# Patient Record
Sex: MALE | Race: WHITE | NOT HISPANIC OR LATINO | Employment: OTHER | URBAN - METROPOLITAN AREA
[De-identification: names, ages, dates, MRNs, and addresses within clinical notes are randomized per-mention and may not be internally consistent; named-entity substitution may affect disease eponyms.]

---

## 2018-01-24 ENCOUNTER — OFFICE VISIT (OUTPATIENT)
Dept: FAMILY MEDICINE | Facility: CLINIC | Age: 68
End: 2018-01-24
Payer: COMMERCIAL

## 2018-01-24 VITALS
TEMPERATURE: 98.1 F | BODY MASS INDEX: 25.26 KG/M2 | WEIGHT: 186.5 LBS | DIASTOLIC BLOOD PRESSURE: 69 MMHG | HEIGHT: 72 IN | SYSTOLIC BLOOD PRESSURE: 119 MMHG | HEART RATE: 54 BPM | RESPIRATION RATE: 12 BRPM | OXYGEN SATURATION: 96 %

## 2018-01-24 DIAGNOSIS — M79.89 LEG SWELLING: Primary | ICD-10-CM

## 2018-01-24 DIAGNOSIS — Z12.11 SPECIAL SCREENING FOR MALIGNANT NEOPLASMS, COLON: ICD-10-CM

## 2018-01-24 DIAGNOSIS — L30.9 ECZEMA, UNSPECIFIED TYPE: ICD-10-CM

## 2018-01-24 DIAGNOSIS — M77.12 LATERAL EPICONDYLITIS OF LEFT ELBOW: ICD-10-CM

## 2018-01-24 LAB — NT-PROBNP SERPL-MCNC: 63 PG/ML (ref 0–125)

## 2018-01-24 PROCEDURE — 80053 COMPREHEN METABOLIC PANEL: CPT | Performed by: FAMILY MEDICINE

## 2018-01-24 PROCEDURE — 99203 OFFICE O/P NEW LOW 30 MIN: CPT | Performed by: FAMILY MEDICINE

## 2018-01-24 PROCEDURE — 83880 ASSAY OF NATRIURETIC PEPTIDE: CPT | Performed by: FAMILY MEDICINE

## 2018-01-24 PROCEDURE — 36415 COLL VENOUS BLD VENIPUNCTURE: CPT | Performed by: FAMILY MEDICINE

## 2018-01-24 NOTE — PROGRESS NOTES
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   - no chronic health issue.   Some joint pain.   Right shoulder - rotator cuff tear.  Elbow - arthroscopic surgery.  Both knee arthritis. Arthroscopic surgeries and hyluronic acid injections.      Left elbow - for last 3-4 months. Painful with movement. For 2 weeks better.   No swelling. ROM painful. No previous injury.     Colonoscopy - overdue. Previous was normal. No FH of colon cancer.     History of ankle injury when young. Swells left ankle - for last 3 months getting worse. Some lesions and itchy. Cortisone helps but not curing.      Problem list and histories reviewed & adjusted, as indicated.  Additional history: as documented    Labs reviewed in EPIC    Reviewed and updated as needed this visit by clinical staff  Tobacco  Allergies  Meds  Med Hx  Surg Hx  Fam Hx  Soc Hx      Reviewed and updated as needed this visit by Provider        Social History     Social History     Marital status:      Spouse name: N/A     Number of children: N/A     Years of education: N/A     Social History Main Topics     Smoking status: Former Smoker     Smokeless tobacco: Never Used     Alcohol use Yes      Comment: 2 glasses of wine twice a week      Drug use: No     Sexual  "activity: Yes     Other Topics Concern     Parent/Sibling W/ Cabg, Mi Or Angioplasty Before 65f 55m? No     Social History Narrative     No Known Allergies  Patient Active Problem List   Diagnosis     PAIN JOINT, SHOULDER     Other postprocedural status(V45.89)     PAIN HIP JOINT     Reviewed medications, social history and  past medical and surgical history.    Review of system: for general, respiratory, CVS, GI and psychiatry negative except for noted above.     EXAM:  /69 (Patient Position: Sitting, Cuff Size: Adult Regular)  Pulse 54  Temp 98.1  F (36.7  C) (Oral)  Resp 12  Ht 6' 0.25\" (1.835 m)  Wt 186 lb 8 oz (84.6 kg)  SpO2 96%  BMI 25.12 kg/m2  Constitutional: healthy, alert and no distress   Psychiatric: mentation appears normal and affect normal/bright  Both lower extremity with 1+ pitting edema.  He has a few diffuse erythematous patches with some excoriation marks.  Left elbow-no visible or palpable deformity.  Just below lateral epicondyles tenderness present.  Passive range of motion can reproduce the same tenderness.    ASSESSMENT / PLAN:  (M79.89) Leg swelling  (primary encounter diagnosis)  Comment: He does not have any cardiac, renal or dietary deficiency.  We talked about possible abdomen deficiency or renal or cardiac etiology or other non-concerning etiology.  We talked about trial of compression socks and monitoring versus obtaining basic labs today and rule out above-mentioned serious etiology.  He is willing to consider one-time lab test today.  He understand those are not the most accurate test but without any other risk factor it may be reasonable to just consider post lab test today.  Plan: Comprehensive metabolic panel, BNP-N terminal         pro             (M77.12) Lateral epicondylitis of left elbow  Comment:     Plan: His symptoms are somewhat improved.  We are going to provide him some home exercise as below and if is not improving he can just call us for physical " therapy referral.    Eczema  Comment -using OTC hydrocortisone with good benefit.  I offered him triamcinolone but he would like to hold off on that for now.  We discussed using thick layer of moisturizer.    (Z12.11) Special screening for malignant neoplasms, colon  Comment:    Plan: GASTROENTEROLOGY ADULT REF PROCEDURE ONLY                   Patient Instructions     For colonoscopy - [phone]                 Lateral Epicondylitis (Tennis Elbow)   Rehabilitation Exercises   You may do the stretching exercises right away. You may do the strengthening exercises when stretching is nearly painless.   Stretching exercises     Wrist range of motion: Bend your wrist forward and backward as far as you can. Do 3 sets of 10.     Pronation and supination of the forearm: With your elbow bent 90 , turn your palm upward and hold for 5 seconds. Slowly turn your palm downward and hold for 5 seconds. Make sure you keep your elbow at your side and bent 90  throughout this exercise. Do 3 sets of 10.     Elbow range of motion: Gently bring your palm up toward your shoulder and bend your elbow as far as you can. Then straighten your elbow as far as you can 10 times. Do 3 sets of 10.   Strengthening exercises     Wrist flexion exercise: Hold a can or hammer handle in your hand with your palm facing up. Bend your wrist upward. Slowly lower the weight and return to the starting position. Do 3 sets of 10. Gradually increase the weight of the can or weight you are holding.     Wrist extension exercise: Hold a soup can or hammer handle in your hand with your palm facing down. Slowly bend your wrist upward. Slowly lower the weight down into the starting position. Do 3 sets of 10. Gradually increase the weight of the object you are holding.     Wrist radial deviation strengthening: Put your wrist in the sideways position with your thumb up. Hold a can of soup or a hammer handle and gently bend your wrist up, with the thumb reaching toward  the ceiling. Slowly lower to the starting position. Do not move your forearm throughout this exercise. Do 3 sets of 10.     Forearm pronation and supination strengthening: Hold a soup can or hammer handle in your hand and bend your elbow 90 . Slowly rotate your hand with your palm upward and then palm down. Do 3 sets of 10.     Wrist extension (with broom handle): Stand up and hold a broom handle in both hands. With your arms at shoulder level, elbows straight and palms down, roll the broom handle backward in your hand as if you are reeling something in using a broom handle. Do 3 sets of 10.   Written by Estela Topete MS, PT, for [employer]   Published by [employer]   This content is reviewed periodically and is subject to change as new health information becomes available. The information is intended to inform and educate and is not a replacement for medical evaluation, advice, diagnosis or treatment by a healthcare professional.   Sports Medicine Advisor 2003.1 Index  Sports Medicine Advisor 2003.1 Credits   Copyright   2003 [employer] All rights reserved.

## 2018-01-24 NOTE — LETTER
January 26, 2018      Neptali Cevallosdamian  [address] [facility] [address]        Dear ,    We are writing to inform you of your test results.    It was a pleasure seeing you in the clinic.  BNP to rule out heart failure and CMP checking your kidney function, albumin and liver function tests are completely normal.  So most likely leg swelling may not be suggestive of anything serious.  Please call me back if you have any further questions or concerns.    Resulted Orders   Comprehensive metabolic panel   Result Value Ref Range    Sodium 139 133 - 144 mmol/L    Potassium 4.1 3.4 - 5.3 mmol/L    Chloride 106 94 - 109 mmol/L    Carbon Dioxide 25 20 - 32 mmol/L    Anion Gap 8 3 - 14 mmol/L    Glucose 98 70 - 99 mg/dL      Comment:      Non Fasting    Urea Nitrogen 22 7 - 30 mg/dL    Creatinine 0.95 0.66 - 1.25 mg/dL    GFR Estimate 79 >60 mL/min/1.7m2      Comment:      Non  GFR Calc    GFR Estimate If Black >90 >60 mL/min/1.7m2      Comment:       GFR Calc    Calcium 8.5 8.5 - 10.1 mg/dL    Bilirubin Total 0.5 0.2 - 1.3 mg/dL    Albumin 4.0 3.4 - 5.0 g/dL    Protein Total 6.8 6.8 - 8.8 g/dL    Alkaline Phosphatase 60 40 - 150 U/L    ALT 16 0 - 70 U/L    AST 17 0 - 45 U/L   BNP-N terminal pro   Result Value Ref Range    N-Terminal Pro Bnp 63 0 - 125 pg/mL      Comment:         Reference range shown and results flagged as abnormal are for the outpatient,   non acute settings. Establishing a baseline value for each individual patient   is useful for follow-up.  Suggested inpatient cut points for confirming diagnosis of CHF in an acute   setting are:   >450 pg/mL (age 18 to less than 50)   >900 pg/mL (age 50 to less than 75)   >1800 pg/mL (75 yrs and older)  An inpatient or emergency department NT-proPBNP <300 pg/mL effectively rules   out acute CHF, with 99% negative predictive value.          If you have any questions or concerns, please call the clinic at the number listed  above.       Sincerely,        Mark Jones MD/nr

## 2018-01-24 NOTE — MR AVS SNAPSHOT
After Visit Summary   1/24/2018    Neptali Monae    MRN: [id number]           Patient Information     Date Of Birth          1950        Visit Information        Provider Department      1/24/2018 1:20 PM Mark Jones MD [facility]        Today's Diagnoses     Special screening for malignant neoplasms, colon    -  1    Leg swelling        Lateral epicondylitis of left elbow          Care Instructions      For colonoscopy - [phone]                 Lateral Epicondylitis (Tennis Elbow)   Rehabilitation Exercises   You may do the stretching exercises right away. You may do the strengthening exercises when stretching is nearly painless.   Stretching exercises     Wrist range of motion: Bend your wrist forward and backward as far as you can. Do 3 sets of 10.     Pronation and supination of the forearm: With your elbow bent 90 , turn your palm upward and hold for 5 seconds. Slowly turn your palm downward and hold for 5 seconds. Make sure you keep your elbow at your side and bent 90  throughout this exercise. Do 3 sets of 10.     Elbow range of motion: Gently bring your palm up toward your shoulder and bend your elbow as far as you can. Then straighten your elbow as far as you can 10 times. Do 3 sets of 10.   Strengthening exercises     Wrist flexion exercise: Hold a can or hammer handle in your hand with your palm facing up. Bend your wrist upward. Slowly lower the weight and return to the starting position. Do 3 sets of 10. Gradually increase the weight of the can or weight you are holding.     Wrist extension exercise: Hold a soup can or hammer handle in your hand with your palm facing down. Slowly bend your wrist upward. Slowly lower the weight down into the starting position. Do 3 sets of 10. Gradually increase the weight of the object you are holding.     Wrist radial deviation strengthening: Put your wrist in the sideways position with your thumb up. Hold a can of  soup or a hammer handle and gently bend your wrist up, with the thumb reaching toward the ceiling. Slowly lower to the starting position. Do not move your forearm throughout this exercise. Do 3 sets of 10.     Forearm pronation and supination strengthening: Hold a soup can or hammer handle in your hand and bend your elbow 90 . Slowly rotate your hand with your palm upward and then palm down. Do 3 sets of 10.     Wrist extension (with broom handle): Stand up and hold a broom handle in both hands. With your arms at shoulder level, elbows straight and palms down, roll the broom handle backward in your hand as if you are reeling something in using a broom handle. Do 3 sets of 10.   Written by Estela Topete, MS, PT, for [employer].   Published by [employer].   This content is reviewed periodically and is subject to change as new health information becomes available. The information is intended to inform and educate and is not a replacement for medical evaluation, advice, diagnosis or treatment by a healthcare professional.   Sports Medicine Advisor 2003.1 Index  Sports Medicine Advisor 2003.1 Credits   Copyright   2003 [employer]. All rights reserved.                            Follow-ups after your visit        Additional Services     GASTROENTEROLOGY ADULT REF PROCEDURE ONLY       Last Lab Result: No results found for: CR  Body mass index is 25.12 kg/(m^2).     Needed:  No  Language:  English    Patient will be contacted to schedule procedure.     Please be aware that coverage of these services is subject to the terms and limitations of your health insurance plan.  Call member services at your health plan with any benefit or coverage questions.  Any procedures must be performed at a [facility] facility OR coordinated by your clinic's referral office.    Please bring the following with you to your appointment:    (1) Any X-Rays, CTs or MRIs which have been  "performed.  Contact the facility where they were done to arrange for  prior to your scheduled appointment.    (2) List of current medications   (3) This referral request   (4) Any documents/labs given to you for this referral                  Who to contact     If you have questions or need follow up information about today's clinic visit or your schedule please contact [facility] directly at [phone].  Normal or non-critical lab and imaging results will be communicated to you by [employer], letter or phone within 4 business days after the clinic has received the results. If you do not hear from us within 7 days, please contact the clinic through [employer] or phone. If you have a critical or abnormal lab result, we will notify you by phone as soon as possible.  Submit refill requests through tic or call your pharmacy and they will forward the refill request to us. Please allow 3 business days for your refill to be completed.          Additional Information About Your Visit        [employer] Information     tic lets you send messages to your doctor, view your test results, renew your prescriptions, schedule appointments and more. To sign up, go to [email or web address] . Click on \"Log in\" on the left side of the screen, which will take you to the Welcome page. Then click on \"Sign up Now\" on the right side of the page.     You will be asked to enter the access code listed below, as well as some personal information. Please follow the directions to create your username and password.     Your access code is: [email or web address]  Expires: 2018  2:15 PM     Your access code will  in 90 days. If you need help or a new code, please call your [facility] clinic or [phone].        Care EveryWhere ID     This is your Care EveryWhere ID. This could be used by other organizations to access your [facility] medical records  [id number]        Your Vitals Were     Pulse Temperature Respirations " "Height Pulse Oximetry BMI (Body Mass Index)    54 98.1  F (36.7  C) (Oral) 12 6' 0.25\" (1.835 m) 96% 25.12 kg/m2       Blood Pressure from Last 3 Encounters:   01/24/18 119/69   06/05/13 141/78    Weight from Last 3 Encounters:   01/24/18 186 lb 8 oz (84.6 kg)   06/05/13 188 lb (85.3 kg)              We Performed the Following     BNP-N terminal pro     Comprehensive metabolic panel     GASTROENTEROLOGY ADULT REF PROCEDURE ONLY        Primary Care Provider Office Phone # Fax #    Mark Dexter Jones [phone] [address] [place], [place] [id number]        Equal Access to Services     MARY BEE : Benji Butt, waaxda luqadaha, qaybta kaalmada arun, robi millan . So [facility] [phone].    ATENCIÓN: Si habla español, tiene a lyons disposición servicios gratuitos de asistencia lingüística. [facility] [phone].    We comply with applicable federal civil rights laws and [place] laws. We do not discriminate on the basis of race, color, national origin, age, disability, sex, sexual orientation, or gender identity.            Thank you!     Thank you for choosing [facility]  for your care. Our goal is always to provide you with excellent care. Hearing back from our patients is one way we can continue to improve our services. Please take a few minutes to complete the written survey that you may receive in the mail after your visit with us. Thank you!             Your Updated Medication List - Protect others around you: Learn how to safely use, store and throw away your medicines at www.disposemymeds.org.          This list is accurate as of 1/24/18  2:15 PM.  Always use your most recent med list.                   Brand Name Dispense Instructions for use Diagnosis    NO ACTIVE MEDICATIONS             "

## 2018-01-24 NOTE — NURSING NOTE
"Chief Complaint   Patient presents with     Establish Care       Initial /69 (Patient Position: Sitting, Cuff Size: Adult Regular)  Pulse 54  Temp 98.1  F (36.7  C) (Oral)  Resp 12  Ht 6' 0.25\" (1.835 m)  Wt 186 lb 8 oz (84.6 kg)  SpO2 96%  BMI 25.12 kg/m2 Estimated body mass index is 25.12 kg/(m^2) as calculated from the following:    Height as of this encounter: 6' 0.25\" (1.835 m).    Weight as of this encounter: 186 lb 8 oz (84.6 kg).  Medication Reconciliation: complete     Marielena Alvarez CMA      "

## 2018-01-24 NOTE — PATIENT INSTRUCTIONS
For colonoscopy - [phone]                 Lateral Epicondylitis (Tennis Elbow)   Rehabilitation Exercises   You may do the stretching exercises right away. You may do the strengthening exercises when stretching is nearly painless.   Stretching exercises     Wrist range of motion: Bend your wrist forward and backward as far as you can. Do 3 sets of 10.     Pronation and supination of the forearm: With your elbow bent 90 , turn your palm upward and hold for 5 seconds. Slowly turn your palm downward and hold for 5 seconds. Make sure you keep your elbow at your side and bent 90  throughout this exercise. Do 3 sets of 10.     Elbow range of motion: Gently bring your palm up toward your shoulder and bend your elbow as far as you can. Then straighten your elbow as far as you can 10 times. Do 3 sets of 10.   Strengthening exercises     Wrist flexion exercise: Hold a can or hammer handle in your hand with your palm facing up. Bend your wrist upward. Slowly lower the weight and return to the starting position. Do 3 sets of 10. Gradually increase the weight of the can or weight you are holding.     Wrist extension exercise: Hold a soup can or hammer handle in your hand with your palm facing down. Slowly bend your wrist upward. Slowly lower the weight down into the starting position. Do 3 sets of 10. Gradually increase the weight of the object you are holding.     Wrist radial deviation strengthening: Put your wrist in the sideways position with your thumb up. Hold a can of soup or a hammer handle and gently bend your wrist up, with the thumb reaching toward the ceiling. Slowly lower to the starting position. Do not move your forearm throughout this exercise. Do 3 sets of 10.     Forearm pronation and supination strengthening: Hold a soup can or hammer handle in your hand and bend your elbow 90 . Slowly rotate your hand with your palm upward and then palm down. Do 3 sets of 10.     Wrist extension (with broom handle):  Stand up and hold a broom handle in both hands. With your arms at shoulder level, elbows straight and palms down, roll the broom handle backward in your hand as if you are reeling something in using a broom handle. Do 3 sets of 10.   Written by Estela Topete MS, PT, for [employer].   Published by [employer].   This content is reviewed periodically and is subject to change as new health information becomes available. The information is intended to inform and educate and is not a replacement for medical evaluation, advice, diagnosis or treatment by a healthcare professional.   Sports Medicine Advisor 2003.1 Index  Sports Medicine Advisor 2003.1 Credits   Copyright   2003 [employer]. All rights reserved.

## 2018-01-25 LAB
ALBUMIN SERPL-MCNC: 4 G/DL (ref 3.4–5)
ALP SERPL-CCNC: 60 U/L (ref 40–150)
ALT SERPL W P-5'-P-CCNC: 16 U/L (ref 0–70)
ANION GAP SERPL CALCULATED.3IONS-SCNC: 8 MMOL/L (ref 3–14)
AST SERPL W P-5'-P-CCNC: 17 U/L (ref 0–45)
BILIRUB SERPL-MCNC: 0.5 MG/DL (ref 0.2–1.3)
BUN SERPL-MCNC: 22 MG/DL (ref 7–30)
CALCIUM SERPL-MCNC: 8.5 MG/DL (ref 8.5–10.1)
CHLORIDE SERPL-SCNC: 106 MMOL/L (ref 94–109)
CO2 SERPL-SCNC: 25 MMOL/L (ref 20–32)
CREAT SERPL-MCNC: 0.95 MG/DL (ref 0.66–1.25)
GFR SERPL CREATININE-BSD FRML MDRD: 79 ML/MIN/1.7M2
GLUCOSE SERPL-MCNC: 98 MG/DL (ref 70–99)
POTASSIUM SERPL-SCNC: 4.1 MMOL/L (ref 3.4–5.3)
PROT SERPL-MCNC: 6.8 G/DL (ref 6.8–8.8)
SODIUM SERPL-SCNC: 139 MMOL/L (ref 133–144)

## 2018-02-12 ENCOUNTER — DOCUMENTATION ONLY (OUTPATIENT)
Dept: VASCULAR SURGERY | Facility: CLINIC | Age: 68
End: 2018-02-12

## 2018-02-12 DIAGNOSIS — Z87.891 FORMER TOBACCO USE: Primary | ICD-10-CM

## 2018-02-12 DIAGNOSIS — Z13.6 ENCOUNTER FOR ABDOMINAL AORTIC ANEURYSM (AAA) SCREENING: Primary | ICD-10-CM

## 2018-02-27 ENCOUNTER — HOSPITAL ENCOUNTER (OUTPATIENT)
Facility: AMBULATORY SURGERY CENTER | Age: 68
End: 2018-02-27
Attending: INTERNAL MEDICINE
Payer: COMMERCIAL

## 2018-03-08 ENCOUNTER — TELEPHONE (OUTPATIENT)
Dept: GASTROENTEROLOGY | Facility: CLINIC | Age: 68
End: 2018-03-08

## 2018-06-25 ENCOUNTER — OFFICE VISIT (OUTPATIENT)
Dept: FAMILY MEDICINE | Facility: CLINIC | Age: 68
End: 2018-06-25
Payer: COMMERCIAL

## 2018-06-25 VITALS
OXYGEN SATURATION: 95 % | HEIGHT: 72 IN | BODY MASS INDEX: 25.87 KG/M2 | RESPIRATION RATE: 15 BRPM | WEIGHT: 191 LBS | DIASTOLIC BLOOD PRESSURE: 65 MMHG | HEART RATE: 50 BPM | TEMPERATURE: 98.6 F | SYSTOLIC BLOOD PRESSURE: 128 MMHG

## 2018-06-25 DIAGNOSIS — Z23 NEED FOR PROPHYLACTIC VACCINATION AGAINST STREPTOCOCCUS PNEUMONIAE (PNEUMOCOCCUS): ICD-10-CM

## 2018-06-25 DIAGNOSIS — G56.32 LEFT RADIAL NEURITIS: ICD-10-CM

## 2018-06-25 DIAGNOSIS — M79.671 INTRACTABLE RIGHT HEEL PAIN: Primary | ICD-10-CM

## 2018-06-25 PROCEDURE — 99213 OFFICE O/P EST LOW 20 MIN: CPT | Mod: 25 | Performed by: FAMILY MEDICINE

## 2018-06-25 PROCEDURE — G0009 ADMIN PNEUMOCOCCAL VACCINE: HCPCS | Performed by: FAMILY MEDICINE

## 2018-06-25 PROCEDURE — 90670 PCV13 VACCINE IM: CPT | Performed by: FAMILY MEDICINE

## 2018-06-25 NOTE — PROGRESS NOTES
SUBJECTIVE:   Neptali Monae is a 68 year old male who presents to clinic today for the following health issues:    Musculoskeletal problem/pain      Duration: 1 month ago    Description  Location: right foot pain    Intensity:  moderate    Accompanying signs and symptoms: sharp pain that comes and go    History  Previous similar problem: no   Previous evaluation:  none    Precipitating or alleviating factors:  Trauma or overuse: YES  Aggravating factors include: none    Therapies tried and outcome: ice and Ibuprofen as needed. Pt states stretching helps a little bit    Going to retire soon. OBGYN.    Right heel - painful in the morning. During day time it gets better.   Stretching helps somewhat.   No previous surgery.  Using arch supports with flat feet. No similar issues before.     Right handed. When biking - left hand 4th and 5th finger area numbness and some discomfort. Tingling worst.        Problem list and histories reviewed & adjusted, as indicated.  Additional history: as documented    Labs reviewed in EPIC    Reviewed and updated as needed this visit by clinical staff    Reviewed and updated as needed this visit by Provider      Social History     Social History     Marital status:      Spouse name: N/A     Number of children: N/A     Years of education: N/A     Occupational History     Not on file.     Social History Main Topics     Smoking status: Former Smoker     Smokeless tobacco: Never Used     Alcohol use Yes      Comment: 2 glasses of wine twice a week      Drug use: No     Sexual activity: Yes     Other Topics Concern     Parent/Sibling W/ Cabg, Mi Or Angioplasty Before 65f 55m? No     Social History Narrative     No Known Allergies  Patient Active Problem List   Diagnosis     PAIN JOINT, SHOULDER     Other postprocedural status(V45.89)     PAIN HIP JOINT     Reviewed medications, social history and  past medical and surgical history.    Review of system: for general, respiratory,  CVS, GI and psychiatry negative except for noted above.     EXAM:  /65 (BP Location: Right arm, Patient Position: Chair, Cuff Size: Adult Regular)  Pulse 50  Temp 98.6  F (37  C) (Oral)  Resp 15  Ht 6' (1.829 m)  Wt 191 lb (86.6 kg)  SpO2 95%  BMI 25.9 kg/m2  Constitutional: healthy, alert and no distress   Psychiatric: mentation appears normal and affect normal/bright most likely calcaneal spur versus plantar fasciitis.  JOINT/EXTREMITIES: Left  Wrist no visible deformity.  Left fifth finger-some flexion deformity.   and strength are normal.    Right heel no visible deformity.  Just at the end of plantar fascia area on the medial heel on plantar surface reported tenderness.    ASSESSMENT / PLAN:   (M54.671) Intractable right heel pain  (primary encounter diagnosis)  Comment:   Plan: I provided him some home exercises and if that fails he should see her podiatrist for further evaluation.  Discussed supportive shoes.    (G56.32) Left radial neuritis  Comment: He has some radioulnar neuropathy type of symptoms that is exacerbated by biking.  Currently symptoms are stable.  He is going to try some wrist splint and if it is not improving he is going to see sports medicine for further evaluation.  Plan: SPORTS MEDICINE REFERRAL            (Z23) Need for prophylactic vaccination against Streptococcus pneumoniae (pneumococcus)  Comment:   Plan: PNEUMOCOCCAL CONJ VACCINE 13 VALENT IM                 Patient Instructions                         Rehabilitation Exercises   You may begin exercising the muscles of your foot right away by gently stretching them as follows:     Prone hip extension: Lie on your stomach with your legs straight out behind you. Tighten the buttocks and thigh muscles of your injured leg and lift it off the floor about 8 inches. Keep your knee straight. Hold for 5 seconds. Then lower your leg and relax. Do 3 sets of 10.     Towel stretch: Sit on a hard surface with one leg stretched out  in front of you. Loop a towel around your toes and the ball of your foot and pull the towel toward your body keeping your knee straight. Hold this position for 15 to 30 seconds then relax. Repeat 3 times.   When the towel stretch becomes too easy, you may begin doing the standing calf stretch.     Standing calf stretch: Facing a wall, put your hands against the wall at about eye level. Keep one leg back with the heel on the floor, and the other leg forward. Turn your back foot slightly inward (as if you were pigeon-toed) as you slowly lean into the wall until you feel a stretch in the back of your calf. Hold for 15 to 30 seconds. Repeat 3 times and then switch the position of your legs and repeat the exercise 3 times. Do this exercise several times each day.     Sitting plantar fascia stretch: Sit in a chair and cross one foot over your other knee. Grab the base of your toes and pull them back toward your leg until you feel a comfortable stretch. Hold 15 seconds and repeat 3 times.   When you can stand comfortably on your injured foot, you can begin standing to stretch the bottom of your foot using the plantar fascia stretch.     Achilles stretch: Stand with the ball of one foot on a stair. Reach for the bottom step with your heel until you feel a stretch in the arch of your foot. Hold this position for 15 to 30 seconds and then relax. Repeat 3 times.   After you have stretched the bottom muscles of your foot, you can begin strengthening the top muscles of your foot.     Frozen can roll: Roll your bare injured foot back and forth from your heel to your mid-arch over a frozen juice can. Repeat for 3 to 5 minutes. This exercise is particularly helpful if done first thing in the morning.     Towel pickup: With your heel on the ground,  a towel with your toes. Release. Repeat 10 to 20 times. When this gets easy, add more resistance by placing a book or small weight on the towel.     Balance and reach exercises    Stand upright next to a chair with your injured leg farthest from the chair. This will provide you with support if you need it. Stand just on the foot of your injured leg. Try to raise the arch of this foot while keeping your toes on the floor.   A. Keep your foot in this position and reach forward in front of you with the hand farthest away from the chair, allowing your knee to bend. Repeat this 10 times while maintaining the arch height. This exercise can be made more difficult by reaching farther in front of you. Do 2 sets.   B.  the same position as above. While maintaining your arch height, reach the hand farthest away from the chair across your body toward the chair. The farther you reach, the more challenging the exercise. Do 2 sets of 10.     Heel raise: Balance yourself while standing behind a chair or counter. Using the chair to help you, raise your body up onto your toes and hold for 5 seconds. Then slowly lower yourself down without holding onto the chair. Hold onto the chair or counter if you need to. When this exercise becomes less painful, try lowering on one leg only. Repeat 10 times. Do 3 sets of 10.     Side-lying leg lift: Lying on your uninjured side, tighten the front thigh muscles on your top leg and lift that leg 8 to 10 inches away from the other leg. Keep the leg straight and lower slowly. Do 3 sets of 10.   Written by Estela Topete, MS, PT, and Kalie Alvarez PT, [facility], [facility], for [employer].   Published by [employer].   Last modified: 2009-02-09   Last reviewed: 2008-07-07   This content is reviewed periodically and is subject to change as new health information becomes available. The information is intended to inform and educate and is not a replacement for medical evaluation, advice, diagnosis or treatment by a healthcare professional.   Sports Medicine Advisor 2009.1 Index

## 2018-06-25 NOTE — NURSING NOTE
Screening Questionnaire for Adult Immunization    Are you sick today?   No   Do you have allergies to medications, food, a vaccine component or latex?   No   Have you ever had a serious reaction after receiving a vaccination?   No   Do you have a long-term health problem with heart disease, lung disease, asthma, kidney disease, metabolic disease (e.g. diabetes), anemia, or other blood disorder?   No   Do you have cancer, leukemia, HIV/AIDS, or any other immune system problem?   No   In the past 3 months, have you taken medications that affect  your immune system, such as prednisone, other steroids, or anticancer drugs; drugs for the treatment of rheumatoid arthritis, Crohn s disease, or psoriasis; or have you had radiation treatments?   No   Have you had a seizure, or a brain or other nervous system problem?   No   During the past year, have you received a transfusion of blood or blood     products, or been given immune (gamma) globulin or antiviral drug?   No   For women: Are you pregnant or is there a chance you could become        pregnant during the next month?   No   Have you received any vaccinations in the past 4 weeks?   No     Immunization questionnaire answers were all negative.        Per orders of Dr. BRENNAN, injection of PREVNAR 13 given by Jamaal Moreno. Patient instructed to remain in clinic for 15 minutes afterwards, and to report any adverse reaction to me immediately.     Due to injection administration, patient instructed to remain in clinic for 15 minutes  afterwards, and to report any adverse reaction to me immediately.    Screening performed by Jamaal Moreno on 6/25/2018 at 2:59 PM.    Jamaal Moreno MA on 6/25/2018 at 2:59 PM

## 2018-06-25 NOTE — PATIENT INSTRUCTIONS
Rehabilitation Exercises   You may begin exercising the muscles of your foot right away by gently stretching them as follows:     Prone hip extension: Lie on your stomach with your legs straight out behind you. Tighten the buttocks and thigh muscles of your injured leg and lift it off the floor about 8 inches. Keep your knee straight. Hold for 5 seconds. Then lower your leg and relax. Do 3 sets of 10.     Towel stretch: Sit on a hard surface with one leg stretched out in front of you. Loop a towel around your toes and the ball of your foot and pull the towel toward your body keeping your knee straight. Hold this position for 15 to 30 seconds then relax. Repeat 3 times.   When the towel stretch becomes too easy, you may begin doing the standing calf stretch.     Standing calf stretch: Facing a wall, put your hands against the wall at about eye level. Keep one leg back with the heel on the floor, and the other leg forward. Turn your back foot slightly inward (as if you were pigeon-toed) as you slowly lean into the wall until you feel a stretch in the back of your calf. Hold for 15 to 30 seconds. Repeat 3 times and then switch the position of your legs and repeat the exercise 3 times. Do this exercise several times each day.     Sitting plantar fascia stretch: Sit in a chair and cross one foot over your other knee. Grab the base of your toes and pull them back toward your leg until you feel a comfortable stretch. Hold 15 seconds and repeat 3 times.   When you can stand comfortably on your injured foot, you can begin standing to stretch the bottom of your foot using the plantar fascia stretch.     Achilles stretch: Stand with the ball of one foot on a stair. Reach for the bottom step with your heel until you feel a stretch in the arch of your foot. Hold this position for 15 to 30 seconds and then relax. Repeat 3 times.   After you have stretched the bottom muscles of your foot, you can begin  strengthening the top muscles of your foot.     Frozen can roll: Roll your bare injured foot back and forth from your heel to your mid-arch over a frozen juice can. Repeat for 3 to 5 minutes. This exercise is particularly helpful if done first thing in the morning.     Towel pickup: With your heel on the ground,  a towel with your toes. Release. Repeat 10 to 20 times. When this gets easy, add more resistance by placing a book or small weight on the towel.     Balance and reach exercises   Stand upright next to a chair with your injured leg farthest from the chair. This will provide you with support if you need it. Stand just on the foot of your injured leg. Try to raise the arch of this foot while keeping your toes on the floor.   A. Keep your foot in this position and reach forward in front of you with the hand farthest away from the chair, allowing your knee to bend. Repeat this 10 times while maintaining the arch height. This exercise can be made more difficult by reaching farther in front of you. Do 2 sets.   B.  the same position as above. While maintaining your arch height, reach the hand farthest away from the chair across your body toward the chair. The farther you reach, the more challenging the exercise. Do 2 sets of 10.     Heel raise: Balance yourself while standing behind a chair or counter. Using the chair to help you, raise your body up onto your toes and hold for 5 seconds. Then slowly lower yourself down without holding onto the chair. Hold onto the chair or counter if you need to. When this exercise becomes less painful, try lowering on one leg only. Repeat 10 times. Do 3 sets of 10.     Side-lying leg lift: Lying on your uninjured side, tighten the front thigh muscles on your top leg and lift that leg 8 to 10 inches away from the other leg. Keep the leg straight and lower slowly. Do 3 sets of 10.   Written by Estela Topete MS, PT, and Kalie Alvarez PT, [facility], [facility], for [facility].    Published by [facility].   Last modified: 2009-02-09   Last reviewed: 2008-07-07   This content is reviewed periodically and is subject to change as new health information becomes available. The information is intended to inform and educate and is not a replacement for medical evaluation, advice, diagnosis or treatment by a healthcare professional.   Sports Medicine Advisor 2009.1 Index

## 2018-06-25 NOTE — MR AVS SNAPSHOT
After Visit Summary   6/25/2018    Neptali Monae    MRN: [id number]           Patient Information     Date Of Birth          1950        Visit Information        Provider Department      6/25/2018 2:00 PM Mark Jones MD [facility]        Today's Diagnoses     Intractable right heel pain    -  1    Left radial neuritis        Need for prophylactic vaccination against Streptococcus pneumoniae (pneumococcus)          Care Instructions                          Rehabilitation Exercises   You may begin exercising the muscles of your foot right away by gently stretching them as follows:     Prone hip extension: Lie on your stomach with your legs straight out behind you. Tighten the buttocks and thigh muscles of your injured leg and lift it off the floor about 8 inches. Keep your knee straight. Hold for 5 seconds. Then lower your leg and relax. Do 3 sets of 10.     Towel stretch: Sit on a hard surface with one leg stretched out in front of you. Loop a towel around your toes and the ball of your foot and pull the towel toward your body keeping your knee straight. Hold this position for 15 to 30 seconds then relax. Repeat 3 times.   When the towel stretch becomes too easy, you may begin doing the standing calf stretch.     Standing calf stretch: Facing a wall, put your hands against the wall at about eye level. Keep one leg back with the heel on the floor, and the other leg forward. Turn your back foot slightly inward (as if you were pigeon-toed) as you slowly lean into the wall until you feel a stretch in the back of your calf. Hold for 15 to 30 seconds. Repeat 3 times and then switch the position of your legs and repeat the exercise 3 times. Do this exercise several times each day.     Sitting plantar fascia stretch: Sit in a chair and cross one foot over your other knee. Grab the base of your toes and pull them back toward your leg until you feel a comfortable stretch. Hold  15 seconds and repeat 3 times.   When you can stand comfortably on your injured foot, you can begin standing to stretch the bottom of your foot using the plantar fascia stretch.     Achilles stretch: Stand with the ball of one foot on a stair. Reach for the bottom step with your heel until you feel a stretch in the arch of your foot. Hold this position for 15 to 30 seconds and then relax. Repeat 3 times.   After you have stretched the bottom muscles of your foot, you can begin strengthening the top muscles of your foot.     Frozen can roll: Roll your bare injured foot back and forth from your heel to your mid-arch over a frozen juice can. Repeat for 3 to 5 minutes. This exercise is particularly helpful if done first thing in the morning.     Towel pickup: With your heel on the ground,  a towel with your toes. Release. Repeat 10 to 20 times. When this gets easy, add more resistance by placing a book or small weight on the towel.     Balance and reach exercises   Stand upright next to a chair with your injured leg farthest from the chair. This will provide you with support if you need it. Stand just on the foot of your injured leg. Try to raise the arch of this foot while keeping your toes on the floor.   A. Keep your foot in this position and reach forward in front of you with the hand farthest away from the chair, allowing your knee to bend. Repeat this 10 times while maintaining the arch height. This exercise can be made more difficult by reaching farther in front of you. Do 2 sets.   B.  the same position as above. While maintaining your arch height, reach the hand farthest away from the chair across your body toward the chair. The farther you reach, the more challenging the exercise. Do 2 sets of 10.     Heel raise: Balance yourself while standing behind a chair or counter. Using the chair to help you, raise your body up onto your toes and hold for 5 seconds. Then slowly lower yourself down without  holding onto the chair. Hold onto the chair or counter if you need to. When this exercise becomes less painful, try lowering on one leg only. Repeat 10 times. Do 3 sets of 10.     Side-lying leg lift: Lying on your uninjured side, tighten the front thigh muscles on your top leg and lift that leg 8 to 10 inches away from the other leg. Keep the leg straight and lower slowly. Do 3 sets of 10.   Written by Estela Topete, MS, PT, and aKlie Alvarez PT, [facility], [facility], for [employer].   Published by [employer].   Last modified: 2009-02-09   Last reviewed: 2008-07-07   This content is reviewed periodically and is subject to change as new health information becomes available. The information is intended to inform and educate and is not a replacement for medical evaluation, advice, diagnosis or treatment by a healthcare professional.   Sports Medicine Advisor 2009.1 Index                        Follow-ups after your visit        Additional Services     SPORTS MEDICINE REFERRAL       Your provider has referred you to:  [facility]: Sports Medicine Clinic [facility] [phone]   [email or web address]    Please be aware that coverage of these services is subject to the terms and limitations of your health insurance plan.  Call member services at your health plan with any benefit or coverage questions.      Please bring the following to your appointment:    >>   Any x-rays, CTs or MRIs which have been performed.  Contact the facility where they were done to arrange for  prior to your scheduled appointment.    >>   List of current medications   >>   This referral request   >>   Any documents/labs given to you for this referral                  Who to contact     If you have questions or need follow up information about today's clinic visit or your schedule please contact [facility] directly at [phone].  Normal or non-critical lab and imaging results will be communicated  to you by MyChart, letter or phone within 4 business days after the clinic has received the results. If you do not hear from us within 7 days, please contact the clinic through MyChart or phone. If you have a critical or abnormal lab result, we will notify you by phone as soon as possible.  Submit refill requests through [employer] or call your pharmacy and they will forward the refill request to us. Please allow 3 business days for your refill to be completed.          Additional Information About Your Visit        Care EveryWhere ID     This is your Care EveryWhere ID. This could be used by other organizations to access your [place] medical records  [id number]        Your Vitals Were     Pulse Temperature Respirations Height Pulse Oximetry BMI (Body Mass Index)    50 98.6  F (37  C) (Oral) 15 6' (1.829 m) 95% 25.9 kg/m2       Blood Pressure from Last 3 Encounters:   06/25/18 128/65   01/24/18 119/69   06/05/13 141/78    Weight from Last 3 Encounters:   06/25/18 191 lb (86.6 kg)   01/24/18 186 lb 8 oz (84.6 kg)   06/05/13 188 lb (85.3 kg)              We Performed the Following     PNEUMOCOCCAL CONJ VACCINE 13 VALENT IM     SPORTS MEDICINE REFERRAL        Primary Care Provider Office Phone # Fax #    Mark Dexter Jones [phone] [address] [place] [id number]        Equal Access to Services     [facility]: Hadii hilton Butt, waaxda luzackary, qaybta kaalmarobi keen . So [facility] [phone].    ATENCIÓN: Si habla español, tiene a lyons disposición servicios gratuitos de asistencia lingüística. Tiago al [phone].    We comply with applicable federal civil rights laws and [place] laws. We do not discriminate on the basis of race, color, national origin, age, disability, sex, sexual orientation, or gender identity.            Thank you!     Thank you for choosing [facility]  for your care. Our goal is  always to provide you with excellent care. Hearing back from our patients is one way we can continue to improve our services. Please take a few minutes to complete the written survey that you may receive in the mail after your visit with us. Thank you!             Your Updated Medication List - Protect others around you: Learn how to safely use, store and throw away your medicines at www.disposemymeds.org.          This list is accurate as of 6/25/18  2:43 PM.  Always use your most recent med list.                   Brand Name Dispense Instructions for use Diagnosis    NO ACTIVE MEDICATIONS

## 2018-09-05 ENCOUNTER — TELEPHONE (OUTPATIENT)
Dept: FAMILY MEDICINE | Facility: CLINIC | Age: 68
End: 2018-09-05

## 2018-09-05 DIAGNOSIS — R09.82 POST-NASAL DRIP: ICD-10-CM

## 2018-09-05 DIAGNOSIS — K58.9 SYMPTOMS CONSISTENT WITH IRRITABLE BOWEL SYNDROME: Primary | ICD-10-CM

## 2018-09-05 NOTE — TELEPHONE ENCOUNTER
"Dr. Jones-Please review and advise if you would recommend patient see you in office visit for ENT symptoms vs following up directly with ENT.    Writer called patient who stated:  1. Symptoms for one month:   A. Mucous drainage down back of throat   B. Will cough/hack when this happens   C. Nasal passages clear and no sneezing   D. Concerned there is a \"foreign body back there\"   E. Took an anti-histamine once and symptoms cleared but returned about 1 day later  2. Denied:   A. Seasonal allergies   B. Recent sinus congestion   C. Blood present in mucous  3. Has not used neti pot/nasal rinse  4. Would like ENT referral  5. Attempted to schedule colonoscopy twice   A. Would like to meet with GI provider performing procedure to discuss IBS symptoms prior to colonoscopy   B. Not formally diagnosed with IBS but having IBS symptoms for years, ongoing      Writer explained to patient some GI providers do not have clinic hours and only perform procedures, but referral can likely be ordered and patient can call different GI clinics to ask if provider he would see in office visit would also be provider to perform procedure.    Patient verbalized understanding and in agreement with plan.    Referrals pended.    Thank you!  NANY CaalN, RN          "

## 2018-09-05 NOTE — TELEPHONE ENCOUNTER
Writer called patient and reviewed message per below from Dr. Jones, along with referral information.    Patient verbalized understanding and in agreement with plan.    Writer offered to mail referrals to patient, but patient declined.    NANY CaalN, RN

## 2018-09-05 NOTE — TELEPHONE ENCOUNTER
Seeing ENT and GI seems appropriate. Signed referral.   OK to try OTC antihistamins like zyrtec or allegra as needed if it is helping. They are safe.

## 2018-09-14 ENCOUNTER — HOSPITAL ENCOUNTER (OUTPATIENT)
Facility: AMBULATORY SURGERY CENTER | Age: 68
End: 2018-09-14
Attending: INTERNAL MEDICINE
Payer: COMMERCIAL

## 2018-09-19 ENCOUNTER — TRANSFERRED RECORDS (OUTPATIENT)
Dept: HEALTH INFORMATION MANAGEMENT | Facility: CLINIC | Age: 68
End: 2018-09-19

## 2018-10-18 ENCOUNTER — TRANSFERRED RECORDS (OUTPATIENT)
Dept: HEALTH INFORMATION MANAGEMENT | Facility: CLINIC | Age: 68
End: 2018-10-18

## 2018-11-08 ENCOUNTER — TRANSFERRED RECORDS (OUTPATIENT)
Dept: HEALTH INFORMATION MANAGEMENT | Facility: CLINIC | Age: 68
End: 2018-11-08

## 2018-11-16 ENCOUNTER — TELEPHONE (OUTPATIENT)
Dept: FAMILY MEDICINE | Facility: CLINIC | Age: 68
End: 2018-11-16

## 2018-11-16 DIAGNOSIS — M25.561 PAIN IN BOTH KNEES, UNSPECIFIED CHRONICITY: Primary | ICD-10-CM

## 2018-11-16 DIAGNOSIS — M25.562 PAIN IN BOTH KNEES, UNSPECIFIED CHRONICITY: Primary | ICD-10-CM

## 2018-11-16 NOTE — TELEPHONE ENCOUNTER
Phone call from patient     1. Recent colonoscopy - had biopsy taken, wondering who he gets the results from? Advised that we do not yet have these results and should f/u with GI to get results.     2. Bilateral knee discomfort right more than left   History of meniscus removal in both knees   Has had injections in knees which helped for a while   Recently over the past 1 has been having more pain in knees   No redness/warmth in kneeds   Denies crunching/clicking noises with movement   No recent injury     Patient wonders if he should come in to see pcp or go to ortho?     Patient has seen [facility] in the past and would prefer to go back to them     Advised patient will route to Dr. Jones and return call with his recommendations -   ortho order pended and likely will recommend ortho     Ursula Carranza Registered Nurse   [facility] and [facility]

## 2018-11-16 NOTE — TELEPHONE ENCOUNTER
Patient informed - number given to schedule     Ursula Carranza Registered Nurse   [facility] and [facility] [[email or web address]]

## 2019-01-02 ASSESSMENT — ACTIVITIES OF DAILY LIVING (ADL): CURRENT_FUNCTION: NO ASSISTANCE NEEDED

## 2019-01-02 ASSESSMENT — ENCOUNTER SYMPTOMS
MYALGIAS: 0
SHORTNESS OF BREATH: 0
CONSTIPATION: 0
CHILLS: 0
ABDOMINAL PAIN: 0
HEMATOCHEZIA: 0
WEAKNESS: 0
FREQUENCY: 0
SORE THROAT: 0
NERVOUS/ANXIOUS: 1
PALPITATIONS: 0
DIARRHEA: 0
PARESTHESIAS: 1
JOINT SWELLING: 0
EYE PAIN: 0
ARTHRALGIAS: 1
HEARTBURN: 0
FEVER: 0
DIZZINESS: 0
NAUSEA: 0
HEMATURIA: 0
HEADACHES: 0
DYSURIA: 0
COUGH: 1

## 2019-01-04 ENCOUNTER — OFFICE VISIT (OUTPATIENT)
Dept: FAMILY MEDICINE | Facility: CLINIC | Age: 69
End: 2019-01-04
Payer: COMMERCIAL

## 2019-01-04 VITALS
RESPIRATION RATE: 15 BRPM | BODY MASS INDEX: 25.09 KG/M2 | DIASTOLIC BLOOD PRESSURE: 61 MMHG | HEART RATE: 50 BPM | HEIGHT: 72 IN | SYSTOLIC BLOOD PRESSURE: 105 MMHG | TEMPERATURE: 98.2 F | WEIGHT: 185.25 LBS | OXYGEN SATURATION: 95 %

## 2019-01-04 DIAGNOSIS — Z11.59 NEED FOR HEPATITIS C SCREENING TEST: ICD-10-CM

## 2019-01-04 DIAGNOSIS — R05.9 COUGH: ICD-10-CM

## 2019-01-04 DIAGNOSIS — Z12.5 SCREENING PSA (PROSTATE SPECIFIC ANTIGEN): ICD-10-CM

## 2019-01-04 DIAGNOSIS — Z00.00 MEDICARE ANNUAL WELLNESS VISIT, INITIAL: Primary | ICD-10-CM

## 2019-01-04 DIAGNOSIS — Z23 NEED FOR PROPHYLACTIC VACCINATION AND INOCULATION AGAINST INFLUENZA: ICD-10-CM

## 2019-01-04 DIAGNOSIS — Z13.6 SCREENING FOR CARDIOVASCULAR CONDITION: ICD-10-CM

## 2019-01-04 PROCEDURE — 36415 COLL VENOUS BLD VENIPUNCTURE: CPT | Performed by: FAMILY MEDICINE

## 2019-01-04 PROCEDURE — 80061 LIPID PANEL: CPT | Performed by: FAMILY MEDICINE

## 2019-01-04 PROCEDURE — 86803 HEPATITIS C AB TEST: CPT | Performed by: FAMILY MEDICINE

## 2019-01-04 PROCEDURE — G0008 ADMIN INFLUENZA VIRUS VAC: HCPCS | Performed by: FAMILY MEDICINE

## 2019-01-04 PROCEDURE — 90662 IIV NO PRSV INCREASED AG IM: CPT | Performed by: FAMILY MEDICINE

## 2019-01-04 PROCEDURE — G0438 PPPS, INITIAL VISIT: HCPCS | Performed by: FAMILY MEDICINE

## 2019-01-04 PROCEDURE — G0103 PSA SCREENING: HCPCS | Performed by: FAMILY MEDICINE

## 2019-01-04 PROCEDURE — 99213 OFFICE O/P EST LOW 20 MIN: CPT | Mod: 25 | Performed by: FAMILY MEDICINE

## 2019-01-04 RX ORDER — CODEINE PHOSPHATE AND GUAIFENESIN 10; 100 MG/5ML; MG/5ML
1 SOLUTION ORAL EVERY 6 HOURS PRN
Qty: 120 ML | Refills: 0 | Status: SHIPPED | OUTPATIENT
Start: 2019-01-04 | End: 2019-03-01

## 2019-01-04 RX ORDER — AZITHROMYCIN 250 MG/1
TABLET, FILM COATED ORAL
Qty: 6 TABLET | Refills: 0 | Status: SHIPPED | OUTPATIENT
Start: 2019-01-04 | End: 2019-03-01

## 2019-01-04 ASSESSMENT — ENCOUNTER SYMPTOMS
FREQUENCY: 0
ARTHRALGIAS: 1
JOINT SWELLING: 0
CHILLS: 0
COUGH: 1
PARESTHESIAS: 1
WEAKNESS: 0
FEVER: 0
DIZZINESS: 0
DIARRHEA: 0
SORE THROAT: 0
HEADACHES: 0
CONSTIPATION: 0
NAUSEA: 0
MYALGIAS: 0
HEMATURIA: 0
DYSURIA: 0
HEARTBURN: 0
ABDOMINAL PAIN: 0
NERVOUS/ANXIOUS: 1
SHORTNESS OF BREATH: 0
EYE PAIN: 0
HEMATOCHEZIA: 0
PALPITATIONS: 0

## 2019-01-04 ASSESSMENT — ACTIVITIES OF DAILY LIVING (ADL): CURRENT_FUNCTION: NO ASSISTANCE NEEDED

## 2019-01-04 ASSESSMENT — MIFFLIN-ST. JEOR: SCORE: 1646.7

## 2019-01-04 NOTE — PATIENT INSTRUCTIONS
Preventive Health Recommendations:     See your health care provider every year to    Review health changes.     Discuss preventive care.      Review your medicines if your doctor has prescribed any.    Talk with your health care provider about whether you should have a test to screen for prostate cancer (PSA).    Every 3 years, have a diabetes test (fasting glucose). If you are at risk for diabetes, you should have this test more often.    Every 5 years, have a cholesterol test. Have this test more often if you are at risk for high cholesterol or heart disease.     Every 10 years, have a colonoscopy. Or, have a yearly FIT test (stool test). These exams will check for colon cancer.    Talk to with your health care provider about screening for Abdominal Aortic Aneurysm if you have a family history of AAA or have a history of smoking.  Shots:     Get a flu shot each year.     Get a tetanus shot every 10 years.     Talk to your doctor about your pneumonia vaccines. There are now two you should receive - Pneumovax (PPSV 23) and Prevnar (PCV 13).    Talk to your pharmacist about a shingles vaccine.     Talk to your doctor about the hepatitis B vaccine.  Nutrition:     Eat at least 5 servings of fruits and vegetables each day.     Eat whole-grain bread, whole-wheat pasta and brown rice instead of white grains and rice.     Get adequate Calcium and Vitamin D.   Lifestyle    Exercise for at least 150 minutes a week (30 minutes a day, 5 days a week). This will help you control your weight and prevent disease.     Limit alcohol to one drink per day.     No smoking.     Wear sunscreen to prevent skin cancer.     See your dentist every six months for an exam and cleaning.     See your eye doctor every 1 to 2 years to screen for conditions such as glaucoma, macular degeneration and cataracts.    Personalized Prevention Plan  You are due for the preventive services outlined below.  Your care team is available to assist you in  scheduling these services.  If you have already completed any of these items, please share that information with your care team to update in your medical record.    Health Maintenance Due   Topic Date Due     Hepatitis C Screening  02/20/1968     Cholesterol Lab - every 5 years  02/20/1985     Discuss Advance Directive Planning  02/20/2005     Zoster (Chicken Pox) Vaccine (2 of 3) 02/02/2015     Flu Vaccine (1) 09/01/2018

## 2019-01-04 NOTE — PROGRESS NOTES
"SUBJECTIVE:   Neptali Monae is a 68 year old male who presents for Preventive Visit.  Are you in the first 12 months of your Medicare coverage?  No    Annual Wellness Visit     In general, how would you rate your overall health?  Good    Frequency of exercise:  2-3 days/week    Duration of exercise:  30-45 minutes    Do you usually eat at least 4 servings of fruit and vegetables a day, include whole grains    & fiber and avoid regularly eating high fat or \"junk\" foods?  Yes    Taking medications regularly:  Not Applicable    Medication side effects:  Not applicable    Ability to successfully perform activities of daily living:  No assistance needed    Home Safety:  Throw rugs in the hallway    Hearing Impairment:  Difficulty following a conversation in a noisy restaurant or crowded room    In the past 6 months, have you been bothered by leaking of urine?  No    In general, how would you rate your overall mental or emotional health?  Fair    PHQ-2 Total Score: 1    Additional concerns today:  No      Medical assistant advised patient about addressing additional health concerns that could be billed, as it is not considered a part of a preventive physical. Patient verbalized understanding.    Mark Jones MD, MD on 1/4/2019 at 10:35 AM  - go over medications    Do you feel safe in your environment? Yes    Do you have a Health Care Directive? Yes: Patient states has Advance Directive and will bring in a copy to clinic.       Fall risk  Fallen 2 or more times in the past year?: No  Any fall with injury in the past year?: No    Cognitive Screening   1) Repeat 3 items (Leader, Season, Table)      2) Clock draw: NORMAL  3) 3 item recall: Recalls 3 objects  Results: NORMAL clock, 1-2 items recalled: COGNITIVE IMPAIRMENT LESS LIKELY    Mini-CogTM Copyright ETHAN Piña. Licensed by the author for use in [facility]; reprinted with permission (sameer@.[facility]). All rights reserved.      Do you have sleep " apnea, excessive snoring or daytime drowsiness?: no    Reviewed and updated as needed this visit by clinical staff  Tobacco  Allergies  Meds  Med Hx  Surg Hx  Fam Hx  Soc Hx        Reviewed and updated as needed this visit by Provider        Social History     Tobacco Use     Smoking status: Former Smoker     Smokeless tobacco: Never Used   Substance Use Topics     Alcohol use: Yes     Comment: 2 glasses of wine twice a week      Alcohol Use 1/2/2019   If you drink alcohol do you typically have greater than 3 drinks per day OR greater than 7 drinks per week? No     Current providers sharing in care for this patient include:   Patient Care Team:  Mark Jones MD as PCP - General (Family Practice)  Mark Jones MD as PCP - Assigned PCP    The following health maintenance items are reviewed in Epic and correct as of today:  Health Maintenance   Topic Date Due     HEPATITIS C SCREENING  02/20/1968     LIPID SCREEN Q5 YR MALE (SYSTEM ASSIGNED)  02/20/1985     ADVANCE DIRECTIVE PLANNING Q5 YRS  02/20/2005     ZOSTER IMMUNIZATION (2 of 3) 02/02/2015     INFLUENZA VACCINE (1) 09/01/2018     FALL RISK ASSESSMENT  06/25/2019     PNEUMOVAX IMMUNIZATION 65+ LOW/MEDIUM RISK (2 of 2 - PPSV23) 10/24/2019     PHQ-2 Q1 YR  01/04/2020     DTAP/TDAP/TD IMMUNIZATION (2 - Td) 06/05/2023     COLON CANCER SCREEN (SYSTEM ASSIGNED)  11/08/2028     AORTIC ANEURYSM SCREENING (SYSTEM ASSIGNED)  Completed     IPV IMMUNIZATION  Aged Out     MENINGITIS IMMUNIZATION  Aged Out     Coughing fits for last 2 weeks, getting worse last 2 days. Hard to sleep. No sick contact. No travel.   Post nasal drip only. No nasal drainage.   No history of asthma. Walking is fine.     Seeing ENT - no improvement in nasal drainage. Did CT scan. Did various other meds - no changes. Was last prescribed ipratropium bromide.   No gerd history. Was advised to do allergy testing if this fails.     Colonoscopy - did well but did not get  "conscious sedation despite request.     Review of Systems   Constitutional: Negative for chills and fever.   HENT: Positive for congestion. Negative for ear pain, hearing loss and sore throat.    Eyes: Negative for pain and visual disturbance.   Respiratory: Positive for cough. Negative for shortness of breath.    Cardiovascular: Positive for chest pain and peripheral edema. Negative for palpitations.   Gastrointestinal: Negative for abdominal pain, constipation, diarrhea, heartburn, hematochezia and nausea.   Genitourinary: Negative for discharge, dysuria, frequency, genital sores, hematuria, impotence and urgency.   Musculoskeletal: Positive for arthralgias. Negative for joint swelling and myalgias.   Skin: Positive for rash.   Neurological: Positive for paresthesias. Negative for dizziness, weakness and headaches.   Psychiatric/Behavioral: Positive for mood changes. The patient is nervous/anxious.      OBJECTIVE:   /61 (BP Location: Left arm, Patient Position: Chair, Cuff Size: Adult Regular)   Pulse 50   Temp 98.2  F (36.8  C) (Oral)   Resp 15   Ht 1.826 m (5' 11.9\")   Wt 84 kg (185 lb 4 oz)   SpO2 95%   BMI 25.19 kg/m   Estimated body mass index is 25.19 kg/m  as calculated from the following:    Height as of this encounter: 1.826 m (5' 11.9\").    Weight as of this encounter: 84 kg (185 lb 4 oz).  Physical Exam  GENERAL: healthy, alert and no distress  EYES: Eyes grossly normal to inspection, PERRL and conjunctivae and sclerae normal  HENT: ear canals and TM's normal, nose and mouth without ulcers or lesions  NECK: no adenopathy, no asymmetry, masses, or scars and thyroid normal to palpation  RESP: lungs clear to auscultation - no rales, rhonchi or wheezes  CV: regular rate and rhythm, normal S1 S2, no S3 or S4, no murmur, click or rub, no peripheral edema and peripheral pulses strong  ABDOMEN: soft, nontender, no hepatosplenomegaly, no masses and bowel sounds normal  MS: no gross musculoskeletal " "defects noted, no edema  SKIN: no suspicious lesions or rashes  NEURO: Normal strength and tone, mentation intact and speech normal  PSYCH: mentation appears normal, affect normal/bright         ASSESSMENT / PLAN:   1. Medicare annual wellness visit, initial       2. Need for prophylactic vaccination and inoculation against influenza     - FLU VACCINE, INCREASED ANTIGEN, PRESV FREE, AGE 65+ [26827]  - Vaccine Administration, Initial [76501]    3. Cough  Been to ENT. Was advised to see allergy if not improving. Referral signed. Trial of zpack if not improving but does not look like bacterial infection right now.    - ALLERGY/ASTHMA ADULT REFERRAL  - guaiFENesin-codeine (ROBITUSSIN AC) 100-10 MG/5ML solution; Take 5 mLs by mouth every 6 hours as needed  Dispense: 120 mL; Refill: 0  - azithromycin (ZITHROMAX) 250 MG tablet; Two tablets first day, then one tablet daily for four days.  Dispense: 6 tablet; Refill: 0    4. Screening PSA (prostate specific antigen)     - PSA, screen    5. Screening for cardiovascular condition     - Lipid panel reflex to direct LDL Fasting    6. Need for hepatitis C screening test     - Hepatitis C Screen Reflex to HCV RNA Quant and Genotype    End of Life Planning:  Patient currently has an advanced directive: No.  I have verified the patient's ablity to prepare an advanced directive/make health care decisions.  Literature was provided to assist patient in preparing an advanced directive.    COUNSELING:  Reviewed preventive health counseling, as reflected in patient instructions  Special attention given to:       Regular exercise       Healthy diet/nutrition       Vision screening       Hearing screening       Dental care       Colon cancer screening       Prostate cancer screening    BP Readings from Last 1 Encounters:   01/04/19 105/61     Estimated body mass index is 25.19 kg/m  as calculated from the following:    Height as of this encounter: 1.826 m (5' 11.9\").    Weight as of this " encounter: 84 kg (185 lb 4 oz).           reports that he has quit smoking. he has never used smokeless tobacco.      Appropriate preventive services were discussed with this patient, including applicable screening as appropriate for cardiovascular disease, diabetes, osteopenia/osteoporosis, and glaucoma.  As appropriate for age/gender, discussed screening for colorectal cancer, prostate cancer, breast cancer, and cervical cancer. Checklist reviewing preventive services available has been given to the patient.    Reviewed patients plan of care and provided an AVS. The Basic Care Plan (routine screening as documented in Health Maintenance) for Neptali meets the Care Plan requirement. This Care Plan has been established and reviewed with the Patient.    Counseling Resources:  ATP IV Guidelines  Pooled Cohorts Equation Calculator  Breast Cancer Risk Calculator  FRAX Risk Assessment  ICSI Preventive Guidelines  Dietary Guidelines for Americans, 2010  [employer]'s MyPlate  ASA Prophylaxis  Lung CA Screening    Mark Jones MD, MD  [facility]  Injectable Influenza Immunization Documentation    1.  Is the person to be vaccinated sick today?  Yes- coughing     2. Does the person to be vaccinated have an allergy to a component   of the vaccine?   No  Egg Allergy Algorithm Link    3. Has the person to be vaccinated ever had a serious reaction   to influenza vaccine in the past?   No    4. Has the person to be vaccinated ever had Guillain-Barré syndrome?   No    Form completed by Emely Garrison MA

## 2019-01-04 NOTE — PROGRESS NOTES
"  SUBJECTIVE:   Neptali Monae is a 68 year old male who presents for Preventive Visit.  {PVP to remind patient that this is not necessarily a physical exam; physical exam may or may not be done:067938::\"click delete button to remove this line now\"}  {PVP to inform patient that additional E&M charge may apply, if additional problems addressed:602321::\"click delete button to remove this line now\"}  Are you in the first 12 months of your Medicare Part B coverage?  { :674555::\"No\"}    Physical Health:    In general, how would you rate your overall physical health? { :177041}    Outside of work, how many days during the week do you exercise? { :164819}    Outside of work, approximately how many minutes a day do you exercise?{ :786256}    If you drink alcohol do you typically have >3 drinks per day or >7 drinks per week? { :987084}    Do you usually eat at least 4 servings of fruit and vegetables a day, include whole grains & fiber and avoid regularly eating high fat or \"junk\" foods? { :217650::\"Yes\"}    Do you have any problems taking medications regularly?  { :578613::\"No\"}    Do you have any side effects from medications? { :113124}    Needs assistance for the following daily activities: { :336180}    Which of the following safety concerns are present in your home?  { :234522::\"none identified\"}     Hearing impairment: { :172289}    In the past 6 months, have you been bothered by leaking of urine? { :195266}    Mental Health:    In general, how would you rate your overall mental or emotional health? { :340622}  PHQ-2 Score: (P) 1    Do you feel safe in your environment? { :553281}    Do you have a Health Care Directive? { :198289}    Additional concerns to address?  {If YES, notify patient they may be responsible for a copay, coinsurance or deductible if the visit today includes services such as checking on a sore throat, having an x-ray or lab test, or treating and evaluating a new or existing condition " ":619775::\"No\"}    Fall risk:  { :274791}  {If any of the above assessments are answered yes, consider ordering appropriate referrals (Optional):268452::\"click delete button to remove this line now\"}  Cognitive Screening: { :071912}    {Do you have sleep apnea, excessive snoring or daytime drowsiness? (Optional):663489}    {Outside tests to abstract? :400465}    {additional problems to add (Optional):844026}    Reviewed and updated as needed this visit by clinical staff  Tobacco  Allergies  Meds  Med Hx  Surg Hx  Fam Hx  Soc Hx        Reviewed and updated as needed this visit by Provider        Social History     Tobacco Use     Smoking status: Former Smoker     Smokeless tobacco: Never Used   Substance Use Topics     Alcohol use: Yes     Comment: 2 glasses of wine twice a week                            Current providers sharing in care for this patient include:   Patient Care Team:  Mark Jones MD as PCP - General (Family Practice)  Mark Jones MD as PCP - Assigned PCP    The following health maintenance items are reviewed in Epic and correct as of today:  Health Maintenance   Topic Date Due     HEPATITIS C SCREENING  02/20/1968     LIPID SCREEN Q5 YR MALE (SYSTEM ASSIGNED)  02/20/1985     ADVANCE DIRECTIVE PLANNING Q5 YRS  02/20/2005     ZOSTER IMMUNIZATION (2 of 3) 02/02/2015     INFLUENZA VACCINE (1) 09/01/2018     FALL RISK ASSESSMENT  06/25/2019     PNEUMOVAX IMMUNIZATION 65+ LOW/MEDIUM RISK (2 of 2 - PPSV23) 10/24/2019     PHQ-2 Q1 YR  01/04/2020     DTAP/TDAP/TD IMMUNIZATION (2 - Td) 06/05/2023     COLON CANCER SCREEN (SYSTEM ASSIGNED)  11/08/2028     AORTIC ANEURYSM SCREENING (SYSTEM ASSIGNED)  Completed     IPV IMMUNIZATION  Aged Out     MENINGITIS IMMUNIZATION  Aged Out     {Chronicprobdata (Optional):963127}  {Decision Support (Optional):442564}    ROS:  {ROS COMP:788185}    OBJECTIVE:   There were no vitals taken for this visit. Estimated body mass index is 25.9 kg/m  " "as calculated from the following:    Height as of 18: 1.829 m (6').    Weight as of 18: 86.6 kg (191 lb).  EXAM:   {Exam :358058}    {Diagnostic Test Results (Optional):761776::\"Diagnostic Test Results:\",\"none \"}    ASSESSMENT / PLAN:   {Diag Picklist:008359}    End of Life Planning:  Patient currently has an advanced directive: { :635139}    COUNSELING:  {Medicare Counselin}    BP Readings from Last 1 Encounters:   18 128/65     Estimated body mass index is 25.9 kg/m  as calculated from the following:    Height as of 18: 1.829 m (6').    Weight as of 18: 86.6 kg (191 lb).    {BP Counseling- Complete if BP >= 120/80  (Optional):214717}  {Weight Management Plan (ACO) Complete if BMI is abnormal-  Ages 18-64  BMI >24.9.  Age 65+ with BMI <23 or >30 (Optional):508010}     reports that he has quit smoking. he has never used smokeless tobacco.  {Tobacco Cessation -- Complete if patient is a smoker (Optional):509879}    Appropriate preventive services were discussed with this patient, including applicable screening as appropriate for cardiovascular disease, diabetes, osteopenia/osteoporosis, and glaucoma.  As appropriate for age/gender, discussed screening for colorectal cancer, prostate cancer, breast cancer, and cervical cancer. Checklist reviewing preventive services available has been given to the patient.    Reviewed patients plan of care and provided an AVS. The {CarePlan:912739} for Neptali meets the Care Plan requirement. This Care Plan has been established and reviewed with the {PATIENT, FAMILY MEMBER, CAREGIVER:305066}.    Counseling Resources:  ATP IV Guidelines  Pooled Cohorts Equation Calculator  Breast Cancer Risk Calculator  FRAX Risk Assessment  ICSI Preventive Guidelines  Dietary Guidelines for Americans, 2010  USDA's MyPlate  ASA Prophylaxis  Lung CA Screening    Mark Jones MD, MD  [facility]  "

## 2019-01-05 LAB
CHOLEST SERPL-MCNC: 157 MG/DL
HDLC SERPL-MCNC: 64 MG/DL
LDLC SERPL CALC-MCNC: 81 MG/DL
NONHDLC SERPL-MCNC: 93 MG/DL
PSA SERPL-ACNC: 0.35 UG/L (ref 0–4)
TRIGL SERPL-MCNC: 61 MG/DL

## 2019-01-07 LAB — HCV AB SERPL QL IA: NONREACTIVE

## 2019-03-01 ENCOUNTER — OFFICE VISIT (OUTPATIENT)
Dept: FAMILY MEDICINE | Facility: CLINIC | Age: 69
End: 2019-03-01
Payer: COMMERCIAL

## 2019-03-01 VITALS
RESPIRATION RATE: 16 BRPM | BODY MASS INDEX: 25.33 KG/M2 | WEIGHT: 187 LBS | DIASTOLIC BLOOD PRESSURE: 56 MMHG | HEART RATE: 52 BPM | OXYGEN SATURATION: 98 % | TEMPERATURE: 98.1 F | SYSTOLIC BLOOD PRESSURE: 98 MMHG | HEIGHT: 72 IN

## 2019-03-01 DIAGNOSIS — R11.10 REGURGITATION OF FOOD: Primary | ICD-10-CM

## 2019-03-01 DIAGNOSIS — R05.9 COUGH: ICD-10-CM

## 2019-03-01 DIAGNOSIS — M17.0 ARTHRITIS OF BOTH KNEES: ICD-10-CM

## 2019-03-01 PROCEDURE — 99213 OFFICE O/P EST LOW 20 MIN: CPT | Mod: 25 | Performed by: FAMILY MEDICINE

## 2019-03-01 PROCEDURE — 20610 DRAIN/INJ JOINT/BURSA W/O US: CPT | Mod: 50 | Performed by: FAMILY MEDICINE

## 2019-03-01 RX ORDER — CHOLECALCIFEROL (VITAMIN D3) 50 MCG
1 TABLET ORAL 2 TIMES DAILY
COMMUNITY
End: 2022-09-26

## 2019-03-01 RX ORDER — TRIAMCINOLONE ACETONIDE 40 MG/ML
40 INJECTION, SUSPENSION INTRA-ARTICULAR; INTRAMUSCULAR ONCE
Qty: 1 ML | Refills: 0 | OUTPATIENT
Start: 2019-03-01 | End: 2019-06-28

## 2019-03-01 RX ORDER — ASPIRIN 81 MG/1
81 TABLET, CHEWABLE ORAL DAILY
COMMUNITY
End: 2019-07-31

## 2019-03-01 RX ORDER — CODEINE PHOSPHATE AND GUAIFENESIN 10; 100 MG/5ML; MG/5ML
1 SOLUTION ORAL 2 TIMES DAILY PRN
Qty: 120 ML | Refills: 0 | Status: SHIPPED | OUTPATIENT
Start: 2019-03-01 | End: 2019-07-31

## 2019-03-01 ASSESSMENT — MIFFLIN-ST. JEOR: SCORE: 1649.64

## 2019-03-01 NOTE — PATIENT INSTRUCTIONS
1. Take it easy today. Use Ice on your knee if it is swollen or having pain.   Ok to take tylenol and ibuprofen if you need to.  2. You will know if the next 7 days if the injection is helpful. You can have a knee injection up to once every 4 months. If it is not helping your pain, please contact your doctor for follow up.  3. Watch for any signs of infection-- fever, chills, redness around the injection site. Please contact us or the doctor on call if any of these develop.

## 2019-03-01 NOTE — PROGRESS NOTES
SUBJECTIVE:   Neptali Monae is a 69 year old male who presents to clinic today for the following health issues:    Musculoskeletal problem/pain      Duration: Oct 2018    Description  Location: right knee but possibly left knee also    Intensity:  Moderate intermittent     Accompanying signs and symptoms: swelling    History  Previous similar problem: YES  Previous evaluation:  x-ray, had injection with [facility] and orthopedic evaluation    Precipitating or alleviating factors:  Trauma or overuse: no   Aggravating factors include: none    Therapies tried and outcome: Ibuprofen and had injections     [facility] - had an xray in Dec. Medial aspect of both knee - meniscus.   hyluronic acid - December and it helped somewhat. Had it in both knee.  Right is worse than left.   Had cortisone shot 3 yrs ago.     Will be skiing in [place] next week.     Still some mucus production and started prilosec a week ago. Felt better for 3-4 days and now getting worse. Been to ENT   Sometime worried about his swallowing capacity. For last 1 yr or so - regurgitation symptoms randomly. Usually solid food. Never been a smoker. Cough syrup with codeine made a significant difference.     Problem list and histories reviewed & adjusted, as indicated.  Additional history: as documented    Labs reviewed in EPIC    Reviewed and updated as needed this visit by clinical staff    Reviewed and updated as needed this visit by Provider      Social History     Occupational History     Not on file   Tobacco Use     Smoking status: Former Smoker     Smokeless tobacco: Never Used   Substance and Sexual Activity     Alcohol use: Yes     Comment: 6 drinks a week      Drug use: No     Sexual activity: Yes     Partners: Female     No Known Allergies  Patient Active Problem List   Diagnosis     PAIN JOINT, SHOULDER     Other postprocedural status(V45.89)     PAIN HIP JOINT     Reviewed medications, social history and  past medical and surgical  "history.    Review of system: for general, respiratory, CVS, GI and psychiatry negative except for noted above.     EXAM:  BP 98/56 (BP Location: Left arm, Patient Position: Sitting, Cuff Size: Adult Regular)   Pulse 52   Temp 98.1  F (36.7  C) (Oral)   Resp 16   Ht 1.826 m (5' 11.9\")   Wt 84.8 kg (187 lb)   SpO2 98%   BMI 25.43 kg/m    Constitutional: healthy, alert and no distress   Psychiatric: mentation appears normal and affect normal/bright  Both knee examined.  Knee range of motion not restricted.  Medial joint line tenderness on both side.  Right medial joint line mild fullness present.     Discussed options for treatment of knee OA including oral medication, joint injection, synvisc and surgery. Pt is failing current oral meds and would like injection today. We discussed complications with current procedure including the risks of infection of the joint, bleeding in joint, pain flare up, and not being effective.     RT knee corticosteroid injection  After risks, benefits and complications of steroid injection were discussed with the patient he elected to proceed.  Using sterile technique, the area was first prepped with alcohol swab and chlorprep..  Topical ethyl chloride was used as local.  A 22 gauge, 1 1/2 inch needle was used to inject 40 mg kenalong(1ml) and 4cc of 1% lidocaine each into the knee joint using an anterolateral joint line approach on the RT side.  Pt.  tolerated the procedure well without complications.  Post injection instructions given.        LT knee corticosteroid injection  After risks, benefits and complications of steroid injection were discussed with the patient he elected to proceed.  Using sterile technique, the area was first prepped with alcohol swab and chlorprep..  Topical ethyl chloride was used as local.  A 22 gauge, 1 1/2 inch needle was used to inject 40 mg kenalong(1ml) and 4cc of 1% lidocaine each into the knee joint using an anterolateral joint line approach on " the LT side.  Pt.  tolerated the procedure well without complications.  Post injection instructions given.          ASSESSMENT / PLAN:  (R11.10) Regurgitation of food  (primary encounter diagnosis)  Comment: With some GERD symptom and some swallowing difficulty.  Been to ENT.  Negative workup.  Persistent cough.  Will ask gastroenterologist to see him for follow-up.  Plan: GASTROENTEROLOGY ADULT REF CONSULT ONLY             (R05) Cough  Comment: [place] and for symptomatic relief I think it would be reasonable to consider curbside of recording.  He understand it is a controlled substance and no refill without seen.  Plan: guaiFENesin-codeine (ROBITUSSIN AC) 100-10         MG/5ML solution, GASTROENTEROLOGY ADULT REF         CONSULT ONLY             (M17.0) Arthritis of both knees  Comment: See procedure above.  Patient was seen at [facility] and I do not have the records.  He has had an x-ray chest 2 months ago and I am going to skip an x-ray today even though we do not have a baseline x-ray.  I will try to obtain his records from [facility].  Plan: DRAIN/INJECT LARGE JOINT/BURSA, triamcinolone         (KENALOG-40) 40 MG/ML injection, TRIAMCINOLONE         ACET INJ NOS, DRAIN/INJECT LARGE JOINT/BURSA,         triamcinolone (KENALOG-40) 40 MG/ML injection,         TRIAMCINOLONE ACET INJ NOS            Follow up: 6-12 months if doing well    The above note was dictated using voice recognition. Although reviewed after completion, some word and grammatical error may remain .      Patient Instructions   1. Take it easy today. Use Ice on your knee if it is swollen or having pain.   Ok to take tylenol and ibuprofen if you need to.  2. You will know if the next 7 days if the injection is helpful. You can have a knee injection up to once every 4 months. If it is not helping your pain, please contact your doctor for follow up.  3. Watch for any signs of infection-- fever, chills, redness around the injection  site. Please contact us or the doctor on call if any of these develop.

## 2019-03-05 ENCOUNTER — DOCUMENTATION ONLY (OUTPATIENT)
Dept: OTHER | Facility: CLINIC | Age: 69
End: 2019-03-05

## 2019-04-19 ENCOUNTER — TELEPHONE (OUTPATIENT)
Dept: FAMILY MEDICINE | Facility: CLINIC | Age: 69
End: 2019-04-19

## 2019-04-19 DIAGNOSIS — M25.561 RIGHT KNEE PAIN: Primary | ICD-10-CM

## 2019-04-19 NOTE — TELEPHONE ENCOUNTER
This relayed to pt. He will go back to Dr Villanueva at [facility]. He will call back if he needs the referral to bring to them or faxed- it has been ordered    Rosalba Goldstein RN, BSN

## 2019-04-19 NOTE — TELEPHONE ENCOUNTER
S-(situation): Right knee pain continues. Rates pain on 1-10 scale as as 8-9, comes and goes, walking but limping. No pain if not walking on area. Ibuprofen and ice helps.   This pain has happened in the last week. Was really good after his steroid injection    B-(background): Had knee injected early march 2019 by Dr Jones, his pain was very good after that , this was the first injection of steroids for 3-4 years   He had orthopedic injection for hyaluronan  acid done Dr Villanueva at [facility] last year    What is next step for pt?    Rosalba Goldstein, RN, BSN

## 2019-04-19 NOTE — TELEPHONE ENCOUNTER
Reason for call:  Patient reporting a symptom    Symptom or request: Knee pain    Duration (how long have symptoms been present): ongoing    Have you been treated for this before? Yes    Additional comments: Pt had a knee injection done recently and is still feeling pain. He is wondering what the next steps would be now    Phone Number patient can be reached at:  Home number on file [phone] (home)    Best Time:  anytime    Can we leave a detailed message on this number:  YES    Call taken on 4/19/2019 at 11:59 AM by Annette Carrillo

## 2019-04-19 NOTE — TELEPHONE ENCOUNTER
Unfortunately I am not sure if repeating another shot would do anything. I suggest seeing ortho for follow up. OK to do referral if needed again.

## 2019-04-22 NOTE — TELEPHONE ENCOUNTER
FUTURE VISIT INFORMATION      FUTURE VISIT INFORMATION:    Date: 7/31/19    Time: 9:00am    Location: [facility]  REFERRAL INFORMATION:    Referring provider:  Dr. Mark Jones    Referring providers clinic:  [facility]    Reason for visit/diagnosis:  Regurgitation of food; cough.    NOTES STATUS DETAILS   OFFICE NOTE from referring provider  Internal 3/1/19, 9/5/18 Telephone   OFFICE NOTE from other specialist   N/A    DISCHARGE SUMMARY FROM HOSPITAL N/A    DISCHARGE REPORT FROM ED N/A    OPERATIVE REPORT  N/A    PFC REPORT N/A    MEDICATION LIST Internal    LABS     FIT/STOOL TESTING N/A    PERTINENT LABS Internal    PATHOLOGY REPORTS RELATED TO DIAGNOSIS N/A    DIAGNOSTIC PROCEDURES     COLONOSCOPY Received 11/8/18 [facility]   ENDOSCOPY (EGD) N/A    ERCP N/A    EUS N/A    FLEX SIGMOIDOSCOPY N/A    IMAGING & REPORT      CT, MRI, US, XR N/A

## 2019-06-09 ENCOUNTER — MYC MEDICAL ADVICE (OUTPATIENT)
Dept: FAMILY MEDICINE | Facility: CLINIC | Age: 69
End: 2019-06-09

## 2019-06-09 DIAGNOSIS — Z01.00 VISIT FOR EYE AND VISION EXAM: Primary | ICD-10-CM

## 2019-06-10 NOTE — TELEPHONE ENCOUNTER
Dr. Jones-Please review and advise.  Would it be preferable patient see an Ophthalmologist vs Optometrist?    Thank you!  NANY CaalN, RN

## 2019-06-28 NOTE — PROGRESS NOTES
Subjective     Neptali Monae is a 69 year old male who presents to clinic today for the following health issues:    HPI   Gastrointestinal symptoms      Duration: past couple of months     Description:           ABDOMINAL PAIN - left upper quadrant  .   Pain is described as dull ache no radiation but is intermittent .    Intensity:  Currently 3/10    Accompanying signs and symptoms:  nausea and problems swallowing - intermittent     History  Previous {similar problem: YES- the last time he was here it was mentioned  Previous evaluation:  none    Aggravating factors: unable to pinpoint what makes it worse, maybe the vitamins that he is taking    Alleviating factors: nothing  Other Therapies tried: None    Lost around 20 lbs in last year or so. Not actively trying to lose weight.   Bike a lot and change his diet[place] [facility]   No major night sweats.   Fatigue - does not feel tired but taking more naps.   Brother - head and neck cancer.     Occasional cigar but nothing consistently. No history of kidney stone.     Noticing left upper quadrant pain. Vague. On and off.   Appetite change.   Last week - nausea randomly. Taking vitamins     Needs to see GI but not for another month. No major GERD. Never had endoscopy.   No constipation.     History of vitamin D deficiency.  Using supplement.  Would like to have levels rechecked.      Social History     Occupational History     Not on file   Tobacco Use     Smoking status: Former Smoker     Smokeless tobacco: Never Used   Substance and Sexual Activity     Alcohol use: Yes     Comment: 6 drinks a week      Drug use: No     Sexual activity: Yes     Partners: Female     No Known Allergies  Patient Active Problem List   Diagnosis     PAIN JOINT, SHOULDER     Other postprocedural status(V45.89)     PAIN HIP JOINT     Reviewed medications, social history and  past medical and surgical history.    Review of system: for general, respiratory, CVS, GI and psychiatry  "negative except for noted above.     EXAM:  /69 (BP Location: Left arm, Patient Position: Sitting, Cuff Size: Adult Large)   Pulse 54   Temp 97.9  F (36.6  C) (Oral)   Ht 1.826 m (5' 11.9\")   Wt 82.3 kg (181 lb 8 oz)   SpO2 95%   BMI 24.68 kg/m    Constitutional: healthy, alert and no distress   Psychiatric: mentation appears normal and affect normal/bright  Cardiovascular: RRR. No murmurs,  Respiratory: negative, Lungs clear. No crackles or wheezing. No tachypnea.   No CVA tenderness  Abdomen: Abdomen soft, non-tender. BS normal. No masses, organomegaly  : Deferred      ASSESSMENT / PLAN:  (R10.12) Left upper quadrant pain  (primary encounter diagnosis)  Comment: Unclear etiology.  Colonoscopy was negative except for few polyps.  He does not have a significant history of cancer.  He has unintentional weight loss and vague abdominal symptoms.  He is scheduled to see gastroenterologist for difficulty swallowing and GERD can contribute but it would be reasonable to obtain a CT scan while he is waiting to see gastroenterologist.  He does not feel like his symptoms are suggestive of GERD.  Order for CT scan given.  I will also obtain some basic lab test for inflammatory markers and CBC today.  Plan: CBC with platelets, CRP, inflammation, ESR:         Erythrocyte sedimentation rate, Comprehensive         metabolic panel, CT Abdomen Pelvis w/o & w         Contrast             (R63.4) Weight loss  Comment: See above.  PSA was negative.  Plan: CBC with platelets, CRP, inflammation, ESR:         Erythrocyte sedimentation rate, Comprehensive         metabolic panel, CT Abdomen Pelvis w/o & w         Contrast             (E55.9) Vitamin D deficiency  Comment: History.  Using supplement.  Plan: Vitamin D Deficiency       The above note was dictated using voice recognition. Although reviewed after completion, some word and grammatical error may remain .      Patient Instructions   - Please call [phone] to " schedule an appointment for CT scan after your blood tests results.

## 2019-07-02 ENCOUNTER — OFFICE VISIT (OUTPATIENT)
Dept: FAMILY MEDICINE | Facility: CLINIC | Age: 69
End: 2019-07-02
Payer: COMMERCIAL

## 2019-07-02 VITALS
BODY MASS INDEX: 24.58 KG/M2 | DIASTOLIC BLOOD PRESSURE: 69 MMHG | WEIGHT: 181.5 LBS | OXYGEN SATURATION: 95 % | SYSTOLIC BLOOD PRESSURE: 116 MMHG | TEMPERATURE: 97.9 F | HEIGHT: 72 IN | HEART RATE: 54 BPM

## 2019-07-02 DIAGNOSIS — R10.12 LEFT UPPER QUADRANT PAIN: Primary | ICD-10-CM

## 2019-07-02 DIAGNOSIS — E55.9 VITAMIN D DEFICIENCY: ICD-10-CM

## 2019-07-02 DIAGNOSIS — R63.4 WEIGHT LOSS: ICD-10-CM

## 2019-07-02 LAB
ALBUMIN SERPL-MCNC: 3.6 G/DL (ref 3.4–5)
ALP SERPL-CCNC: 58 U/L (ref 40–150)
ALT SERPL W P-5'-P-CCNC: 17 U/L (ref 0–70)
ANION GAP SERPL CALCULATED.3IONS-SCNC: 6 MMOL/L (ref 3–14)
AST SERPL W P-5'-P-CCNC: 10 U/L (ref 0–45)
BILIRUB SERPL-MCNC: 0.8 MG/DL (ref 0.2–1.3)
BUN SERPL-MCNC: 17 MG/DL (ref 7–30)
CALCIUM SERPL-MCNC: 8.8 MG/DL (ref 8.5–10.1)
CHLORIDE SERPL-SCNC: 108 MMOL/L (ref 94–109)
CO2 SERPL-SCNC: 27 MMOL/L (ref 20–32)
CREAT SERPL-MCNC: 0.98 MG/DL (ref 0.66–1.25)
CRP SERPL-MCNC: <2.9 MG/L (ref 0–8)
DEPRECATED CALCIDIOL+CALCIFEROL SERPL-MC: 35 UG/L (ref 20–75)
ERYTHROCYTE [DISTWIDTH] IN BLOOD BY AUTOMATED COUNT: 12.8 % (ref 10–15)
ERYTHROCYTE [SEDIMENTATION RATE] IN BLOOD BY WESTERGREN METHOD: 5 MM/H (ref 0–20)
GFR SERPL CREATININE-BSD FRML MDRD: 78 ML/MIN/{1.73_M2}
GLUCOSE SERPL-MCNC: 94 MG/DL (ref 70–99)
HCT VFR BLD AUTO: 41.3 % (ref 40–53)
HGB BLD-MCNC: 13.7 G/DL (ref 13.3–17.7)
MCH RBC QN AUTO: 32 PG (ref 26.5–33)
MCHC RBC AUTO-ENTMCNC: 33.2 G/DL (ref 31.5–36.5)
MCV RBC AUTO: 97 FL (ref 78–100)
PLATELET # BLD AUTO: 267 10E9/L (ref 150–450)
POTASSIUM SERPL-SCNC: 5 MMOL/L (ref 3.4–5.3)
PROT SERPL-MCNC: 6.5 G/DL (ref 6.8–8.8)
RBC # BLD AUTO: 4.28 10E12/L (ref 4.4–5.9)
SODIUM SERPL-SCNC: 141 MMOL/L (ref 133–144)
WBC # BLD AUTO: 6.6 10E9/L (ref 4–11)

## 2019-07-02 PROCEDURE — 80053 COMPREHEN METABOLIC PANEL: CPT | Performed by: FAMILY MEDICINE

## 2019-07-02 PROCEDURE — 85652 RBC SED RATE AUTOMATED: CPT | Performed by: FAMILY MEDICINE

## 2019-07-02 PROCEDURE — 82306 VITAMIN D 25 HYDROXY: CPT | Performed by: FAMILY MEDICINE

## 2019-07-02 PROCEDURE — 86140 C-REACTIVE PROTEIN: CPT | Performed by: FAMILY MEDICINE

## 2019-07-02 PROCEDURE — 85027 COMPLETE CBC AUTOMATED: CPT | Performed by: FAMILY MEDICINE

## 2019-07-02 PROCEDURE — 36415 COLL VENOUS BLD VENIPUNCTURE: CPT | Performed by: FAMILY MEDICINE

## 2019-07-02 PROCEDURE — 99214 OFFICE O/P EST MOD 30 MIN: CPT | Performed by: FAMILY MEDICINE

## 2019-07-02 ASSESSMENT — MIFFLIN-ST. JEOR: SCORE: 1624.69

## 2019-07-02 NOTE — PATIENT INSTRUCTIONS
- Please call [phone] to schedule an appointment for CT scan after your blood tests results.

## 2019-07-11 ENCOUNTER — ANCILLARY PROCEDURE (OUTPATIENT)
Dept: CT IMAGING | Facility: CLINIC | Age: 69
End: 2019-07-11
Attending: FAMILY MEDICINE
Payer: COMMERCIAL

## 2019-07-11 DIAGNOSIS — R63.4 WEIGHT LOSS: ICD-10-CM

## 2019-07-11 DIAGNOSIS — R10.12 LEFT UPPER QUADRANT PAIN: ICD-10-CM

## 2019-07-11 RX ORDER — IOPAMIDOL 755 MG/ML
111 INJECTION, SOLUTION INTRAVASCULAR ONCE
Status: COMPLETED | OUTPATIENT
Start: 2019-07-11 | End: 2019-07-11

## 2019-07-11 RX ADMIN — IOPAMIDOL 111 ML: 755 INJECTION, SOLUTION INTRAVASCULAR at 09:02

## 2019-07-29 ENCOUNTER — TELEPHONE (OUTPATIENT)
Dept: GASTROENTEROLOGY | Facility: CLINIC | Age: 69
End: 2019-07-29

## 2019-07-31 ENCOUNTER — TELEPHONE (OUTPATIENT)
Dept: GASTROENTEROLOGY | Facility: CLINIC | Age: 69
End: 2019-07-31

## 2019-07-31 ENCOUNTER — PRE VISIT (OUTPATIENT)
Dept: GASTROENTEROLOGY | Facility: CLINIC | Age: 69
End: 2019-07-31

## 2019-07-31 ENCOUNTER — OFFICE VISIT (OUTPATIENT)
Dept: GASTROENTEROLOGY | Facility: CLINIC | Age: 69
End: 2019-07-31
Payer: COMMERCIAL

## 2019-07-31 VITALS
WEIGHT: 186.3 LBS | OXYGEN SATURATION: 99 % | DIASTOLIC BLOOD PRESSURE: 79 MMHG | HEART RATE: 49 BPM | SYSTOLIC BLOOD PRESSURE: 126 MMHG | BODY MASS INDEX: 25.23 KG/M2 | HEIGHT: 72 IN

## 2019-07-31 DIAGNOSIS — R13.10 DYSPHAGIA, UNSPECIFIED TYPE: Primary | ICD-10-CM

## 2019-07-31 DIAGNOSIS — R05.9 COUGH: ICD-10-CM

## 2019-07-31 RX ORDER — CHOLECALCIFEROL (VITAMIN D3) 50 MCG
1 TABLET ORAL 2 TIMES DAILY
Status: CANCELLED | OUTPATIENT
Start: 2019-07-31

## 2019-07-31 RX ORDER — NICOTINE POLACRILEX 4 MG/1
25 GUM, CHEWING ORAL
Status: CANCELLED | OUTPATIENT
Start: 2019-07-31

## 2019-07-31 RX ORDER — ASPIRIN 81 MG/1
81 TABLET, CHEWABLE ORAL DAILY
Status: CANCELLED | OUTPATIENT
Start: 2019-07-31

## 2019-07-31 ASSESSMENT — ENCOUNTER SYMPTOMS
INCREASED ENERGY: 0
NAUSEA: 1
NECK PAIN: 0
JOINT SWELLING: 0
BLOATING: 0
JAUNDICE: 0
ABDOMINAL PAIN: 1
CONSTIPATION: 0
DEPRESSION: 1
POLYPHAGIA: 0
HYPERTENSION: 0
BLOOD IN STOOL: 0
SLEEP DISTURBANCES DUE TO BREATHING: 0
DECREASED CONCENTRATION: 0
LIGHT-HEADEDNESS: 0
EXERCISE INTOLERANCE: 0
HALLUCINATIONS: 0
INSOMNIA: 1
FEVER: 0
VOMITING: 0
MUSCLE CRAMPS: 1
RECTAL PAIN: 0
SMELL DISTURBANCE: 0
FATIGUE: 0
PALPITATIONS: 0
PANIC: 0
POLYDIPSIA: 0
NERVOUS/ANXIOUS: 1
SORE THROAT: 0
WEIGHT LOSS: 1
MUSCLE WEAKNESS: 0
ORTHOPNEA: 0
BOWEL INCONTINENCE: 0
ALTERED TEMPERATURE REGULATION: 0
TASTE DISTURBANCE: 0
MYALGIAS: 1
CHILLS: 0
STIFFNESS: 1
ARTHRALGIAS: 1
SYNCOPE: 0
HEARTBURN: 0
SINUS PAIN: 0
DIARRHEA: 1
NECK MASS: 0
TROUBLE SWALLOWING: 1
DECREASED APPETITE: 1
LEG PAIN: 0
BACK PAIN: 0
HYPOTENSION: 0
NIGHT SWEATS: 0
HOARSE VOICE: 0
WEIGHT GAIN: 0
SINUS CONGESTION: 0

## 2019-07-31 ASSESSMENT — PAIN SCALES - GENERAL: PAINLEVEL: MILD PAIN (2)

## 2019-07-31 ASSESSMENT — MIFFLIN-ST. JEOR: SCORE: 1646.46

## 2019-07-31 NOTE — PATIENT INSTRUCTIONS
1. Schedule an EGD at your earliest convenience to evaluate your dysphagia and other gut symptoms.   2. Recommend low gas diet  3. Recommend lifestyle changes to limit aerophagia (no carbonated beverages, no straws, no gum, no hard candy).  4. Consider increasing Citrucel to one scoopful daily.   5. Follow-up in GI clinic after the EGD with Dr. Youngblood or SHELLIE Jeong in 8 -12 weeks.      If you have any questions, please don't hesitate to contact me through our GI RN Clinic Coordinator, Michelle Simmons, at [phone].        Patient Education     Excess Gas  Certain foods produce gas when digested. In some people, these foods make an excessive amount of gas. This may cause bloating, burping, or increased gas passing through the rectum (flatulence).     Foods that cause gas  The following foods are more likely to cause this problem. Limit them, or remove them from your diet:    Broccoli    Cauliflower    [place] sprouts    Cabbage    Cooked dried beans    Fizzy (carbonated) drinks, such as sparkling water, soda, beer, and champagne  Other causes  Other causes of excess gas include:    Eating too fast or talking while you chew. This may cause you to swallow air. This increases the amount of gas in your stomach. And it may make your symptoms worse. Chew each mouthful completely before you swallow. Take your time.    Chewing on gum or sucking on hard candy. These cause you to swallow more often. And some of what you are swallowing is air. This leads to more gas in your stomach. Avoid chewing gum and hard candy.    Overeating. This may increase the feeling of being bloated and cause more gas. When you are full, stop eating.     Being constipated. This can increase the amount of normal intestinal gas. Avoid constipation by getting more fiber in your diet. Good sources of fiber include whole-grain cereal, fresh vegetables (except those in the above list), and fresh fruits. High-fiber foods absorb water and carry  it out of the body. When adding more fiber to your diet, you also need to drink more water. You should drink at least 8, 8-ounce glasses of water (2 quarts) per day.  Date Last Reviewed: 8/1/2016 2000-2018 [employer]. [address] [place] [address]. All rights reserved. This information is not intended as a substitute for professional medical care. Always follow your healthcare professional's instructions.

## 2019-07-31 NOTE — NURSING NOTE
"Chief Complaint   Patient presents with     Consult     new pt here for regurgitation       Vitals:    07/31/19 0858   BP: 126/79   BP Location: Left arm   Patient Position: Chair   Cuff Size: Adult Regular   Pulse: (!) 49   SpO2: 99%   Weight: 84.5 kg (186 lb 4.8 oz)   Height: 1.826 m (5' 11.9\")       Body mass index is 25.34 kg/m .    Chavez Castellon, EMT    "

## 2019-07-31 NOTE — LETTER
"7/31/2019       RE: Neptali Flood  [address] [facility] [address]     Dear Colleague,    Thank you for referring your patient, Neptali Flood, to the [facility] GASTROENTEROLOGY AND IBD CLINIC at [facility]. Please see a copy of my visit note below.    GI CLINIC VISIT    CC/REFERRING MD:  Mark Jones  REASON FOR CONSULTATION:   Mr. Blanton is a 69 year old male who I was asked to see in consultation at the request of Dr. Mark Jones for   Chief Complaint   Patient presents with     Consult     new pt here for regurgitation       ASSESSMENT/PLAN:  (R13.10) Dysphagia, unspecified type  (primary encounter diagnosis)  (R05) Cough  Comment:   Plan:   1. Schedule an EGD at your earliest convenience to evaluate your dysphagia and other gut symptoms.   2. Recommend low gas diet  3. Recommend lifestyle changes to limit aerophagia (no carbonated beverages, no straws, no gum, no hard candy).  4. Consider increasing Citrucel to one scoopful daily.   5. Follow-up in GI clinic after the EGD with Dr. Youngblood or SHELLIE Jeong in 8 -12 weeks.     Thank you for this consultation.  It was a pleasure to participate in the care of this patient; please contact us with any further questions.  A total of 45 face-to-face minutes was spent with this patient, >50% of which was counseling regarding the above delineated issues.    Oliva Youngblood MD   of Medicine  Division of Gastroenterology, Hepatology and Nutrition  [facility]    ---------------------------------------------------------------------------------------------------  HPI:     Mr. Flood is a 69 year old male with joint pains who presents for evaluation of \"regurgitation\" and early satiety.    Regurgitation -     Describes a lot of phlegm in throat, brings it up frequently - feels this is \"kind of minor\" \"not overwhelming\"     Referred for bringing up of food, though " "Mr. Monae denies this is an issue. Thinks maybe it happened once, months ago when trying to clear up phlegm.     Saw ENT - treated for allergic rhinitis and post-nasal drip. No treatment worked. Normal CT sinus, ENT exam with deviated septum.     PCP gave guaifensin with codeine which helped phlegm.    PPI for 2 weeks made no diferrence    Nausea -     Happens only in the morning, lasts 5 minutes and resolves on its own. Occurs during exercise (bike riding)     Early satiety -     Doesn't eat as much as he did in the past. Reports he is full faster - typically bothers him in the morning.     LUQ -    discomfort started a few months ago   feels like a \"fullness\" in LUQ, mild signal, he can often ignore it, doesn't ever radiate, doesn't worsen, feels it is gone by evening   Thinks it started around the time of early satiety    ROS:    GI ROS:  Dysphagia: infrequently feels that food is taking awhile to travel to stomach - only with solids - occurs a couple times a month  Odynophagia: none  GERD symptoms: rare, burning in chest  Nausea/vomiting: see HPI  Hematemesis/melena/hematochezia: none, has seen drops of blood with wiping in past  Baseline stools:  Trend to looser, on a bad day may have a cluster of 3-4 loose BMs over 30-60 minutes in the morning (he feels this occurs after alcohol ingestion or periods of anxiety), on good days - generally has two BMs in the morning, formed, then no further  Eructation: no change from baseline  Flatus: feels this has increased off and on his whole life  Abdominal pain: see HPI  Weight loss: has lost 20 lbs over 6 months which he attributes to now working out daily as well as walking more (does not have a car)  NSAIDs:  Takes ibuprofen 400 mg one to two times a weeks  Oral steroids: none     Fevers/chills: none  Headache: none  Vision changes: none  Chest pain/pressure: none  Dyspnea: none  Skin changes/rashes: none  Lymphadenopathy: none  Myalgias/Arthralgias: " none  Anxiety/depression: none - sees a counselor for baseline mood issues    PROBLEM LIST  Patient Active Problem List    Diagnosis Date Noted     PAIN HIP JOINT 09/02/2015     Priority: Medium     PAIN JOINT, SHOULDER 07/07/2015     Priority: Medium     Other postprocedural status(V45.89) 07/07/2015     Priority: Medium       PERTINENT PAST MEDICAL HISTORY:  Past Medical History:   Diagnosis Date     Arthritis        PREVIOUS SURGERIES:  Past Surgical History:   Procedure Laterality Date     EXTRACTION(S) DENTAL       KNEE SURGERY      bilateral; partial meniscus removal     TONSILLECTOMY         ALLERGIES:   No Known Allergies    PERTINENT MEDICATIONS:  Current Outpatient Medications   Medication     Pediatric Multivitamins-Fl (MULTIVITAMIN WITH 1 MG FLUORIDE) 1 MG CHEW     aspirin (ASA) 81 MG chewable tablet     guaiFENesin-codeine (ROBITUSSIN AC) 100-10 MG/5ML solution     OMEPRAZOLE PO     vitamin D3 (CHOLECALCIFEROL) 2000 units tablet     No current facility-administered medications for this visit.        SOCIAL HISTORY:  Social History     Socioeconomic History     Marital status:      Spouse name: Not on file     Number of children: Not on file     Years of education: Not on file     Highest education level: Not on file   Occupational History     Not on file   Social Needs     Financial resource strain: Not on file     Food insecurity:     Worry: Not on file     Inability: Not on file     Transportation needs:     Medical: Not on file     Non-medical: Not on file   Tobacco Use     Smoking status: Former Smoker     Smokeless tobacco: Never Used   Substance and Sexual Activity     Alcohol use: Yes     Comment: 6 drinks a week      Drug use: No     Sexual activity: Yes     Partners: Female   Lifestyle     Physical activity:     Days per week: Not on file     Minutes per session: Not on file     Stress: Not on file   Relationships     Social connections:     Talks on phone: Not on file     Gets  "together: Not on file     Attends Caodaism service: Not on file     Active member of club or organization: Not on file     Attends meetings of clubs or organizations: Not on file     Relationship status: Not on file     Intimate partner violence:     Fear of current or ex partner: Not on file     Emotionally abused: Not on file     Physically abused: Not on file     Forced sexual activity: Not on file   Other Topics Concern     Parent/sibling w/ CABG, MI or angioplasty before 65F 55M? No   Social History Narrative     Not on file       FAMILY HISTORY:  Family History   Problem Relation Age of Onset     Dementia Mother      Arthritis Mother      Pacemaker Father      Past/family/social history reviewed and no changes    PHYSICAL EXAMINATION:  Vitals /79 (BP Location: Left arm, Patient Position: Chair, Cuff Size: Adult Regular)   Pulse (!) 49   Ht 1.826 m (5' 11.9\")   Wt 84.5 kg (186 lb 4.8 oz)   SpO2 99%   BMI 25.34 kg/m      Wt   Wt Readings from Last 2 Encounters:   07/31/19 84.5 kg (186 lb 4.8 oz)   07/02/19 82.3 kg (181 lb 8 oz)   Gen: Pt sitting up in NAD, interactive and cooperative on exam  Eyes: sclerae anicteric, no injection  ENT:  OP clear, MMM  Cardiac: RRR, nl S1, S2  Resp: Clear on anterior exam  GI: Normoactive BS, abd soft, flat, nontender  Skin: Warm, dry, no jaundice, nails appear healthy  Lymph: no submandibular, no cervical, and no supraclavicular LAD  Neuro: alert, oriented, answers questions appropriately      PERTINENT STUDIES:  Office Visit on 07/02/2019   Component Date Value Ref Range Status     WBC 07/02/2019 6.6  4.0 - 11.0 10e9/L Final     RBC Count 07/02/2019 4.28* 4.4 - 5.9 10e12/L Final     Hemoglobin 07/02/2019 13.7  13.3 - 17.7 g/dL Final     Hematocrit 07/02/2019 41.3  40.0 - 53.0 % Final     MCV 07/02/2019 97  78 - 100 fl Final     MCH 07/02/2019 32.0  26.5 - 33.0 pg Final     MCHC 07/02/2019 33.2  31.5 - 36.5 g/dL Final     RDW 07/02/2019 12.8  10.0 - 15.0 % Final     " Platelet Count 07/02/2019 267  150 - 450 10e9/L Final     CRP Inflammation 07/02/2019 <2.9  0.0 - 8.0 mg/L Final     Sed Rate 07/02/2019 5  0 - 20 mm/h Final     Vitamin D Deficiency screening 07/02/2019 35  20 - 75 ug/L Final     Sodium 07/02/2019 141  133 - 144 mmol/L Final     Potassium 07/02/2019 5.0  3.4 - 5.3 mmol/L Final     Chloride 07/02/2019 108  94 - 109 mmol/L Final     Carbon Dioxide 07/02/2019 27  20 - 32 mmol/L Final     Anion Gap 07/02/2019 6  3 - 14 mmol/L Final     Glucose 07/02/2019 94  70 - 99 mg/dL Final     Urea Nitrogen 07/02/2019 17  7 - 30 mg/dL Final     Creatinine 07/02/2019 0.98  0.66 - 1.25 mg/dL Final     GFR Estimate 07/02/2019 78  >60 mL/min/[1.73_m2] Final     GFR Estimate If Black 07/02/2019 >90  >60 mL/min/[1.73_m2] Final     Calcium 07/02/2019 8.8  8.5 - 10.1 mg/dL Final     Bilirubin Total 07/02/2019 0.8  0.2 - 1.3 mg/dL Final     Albumin 07/02/2019 3.6  3.4 - 5.0 g/dL Final     Protein Total 07/02/2019 6.5* 6.8 - 8.8 g/dL Final     Alkaline Phosphatase 07/02/2019 58  40 - 150 U/L Final     ALT 07/02/2019 17  0 - 70 U/L Final     AST 07/02/2019 10  0 - 45 U/L Final       Again, thank you for allowing me to participate in the care of your patient.      Sincerely,    Oliva Youngblood MD

## 2019-07-31 NOTE — TELEPHONE ENCOUNTER
Patient scheduled for EGD    Indication for procedure. Dysphagia, unspecified type    Referring Provider. JUNIOR BRENNAN     ? No     Arrival time verified? 9:15 AM    Facility location verified? [address] [place] [address]    Instructions given regarding prep and procedure    Prep Type NPO    Are you taking any anticoagulants or blood thinners? No     Instructions given? N/a     Electronic implanted devices? Denies     Pre procedure teaching completed? Yes    Transportation from procedure?  policy reviewed. Instructed patient to have someone stay with him for 6 hours post exam    H&P / Pre op physical completed? N/a

## 2019-07-31 NOTE — PROGRESS NOTES
"GI CLINIC VISIT    CC/REFERRING MD:  Mark Jones  REASON FOR CONSULTATION:   Mr. Blanton is a 69 year old male who I was asked to see in consultation at the request of Dr. Mark Jones for   Chief Complaint   Patient presents with     Consult     new pt here for regurgitation       ASSESSMENT/PLAN:  (R13.10) Dysphagia, unspecified type  (primary encounter diagnosis)  (R05) Cough  Comment:   Plan:   1. Schedule an EGD at your earliest convenience to evaluate your dysphagia and other gut symptoms.   2. Recommend low gas diet  3. Recommend lifestyle changes to limit aerophagia (no carbonated beverages, no straws, no gum, no hard candy).  4. Consider increasing Citrucel to one scoopful daily.   5. Follow-up in GI clinic after the EGD with Dr. Youngblood or SHELLIE Jeong in 8 -12 weeks.       Thank you for this consultation.  It was a pleasure to participate in the care of this patient; please contact us with any further questions.  A total of 45 face-to-face minutes was spent with this patient, >50% of which was counseling regarding the above delineated issues.    Oliva Youngblood MD   of Medicine  Division of Gastroenterology, Hepatology and Nutrition  [facility]    ---------------------------------------------------------------------------------------------------  HPI:     Mr. Flood is a 69 year old male with joint pains who presents for evaluation of \"regurgitation\" and early satiety.    Regurgitation -     Describes a lot of phlegm in throat, brings it up frequently - feels this is \"kind of minor\" \"not overwhelming\"     Referred for bringing up of food, though Mr. Monae denies this is an issue. Thinks maybe it happened once, months ago when trying to clear up phlegm.     Saw ENT - treated for allergic rhinitis and post-nasal drip. No treatment worked. Normal CT sinus, ENT exam with deviated septum.     PCP gave guaifensin with codeine which helped " "phlegm.    PPI for 2 weeks made no diferrence    Nausea -     Happens only in the morning, lasts 5 minutes and resolves on its own. Occurs during exercise (bike riding)     Early satiety -     Doesn't eat as much as he did in the past. Reports he is full faster - typically bothers him in the morning.     LUQ -    discomfort started a few months ago   feels like a \"fullness\" in LUQ, mild signal, he can often ignore it, doesn't ever radiate, doesn't worsen, feels it is gone by evening   Thinks it started around the time of early satiety        ROS:    GI ROS:  Dysphagia: infrequently feels that food is taking awhile to travel to stomach - only with solids - occurs a couple times a month  Odynophagia: none  GERD symptoms: rare, burning in chest  Nausea/vomiting: see HPI  Hematemesis/melena/hematochezia: none, has seen drops of blood with wiping in past  Baseline stools:  Trend to looser, on a bad day may have a cluster of 3-4 loose BMs over 30-60 minutes in the morning (he feels this occurs after alcohol ingestion or periods of anxiety), on good days - generally has two BMs in the morning, formed, then no further  Eructation: no change from baseline  Flatus: feels this has increased off and on his whole life  Abdominal pain: see HPI  Weight loss: has lost 20 lbs over 6 months which he attributes to now working out daily as well as walking more (does not have a car)  NSAIDs:  Takes ibuprofen 400 mg one to two times a weeks  Oral steroids: none     Fevers/chills: none  Headache: none  Vision changes: none  Chest pain/pressure: none  Dyspnea: none  Skin changes/rashes: none  Lymphadenopathy: none  Myalgias/Arthralgias: none  Anxiety/depression: none - sees a counselor for baseline mood issues    PROBLEM LIST  Patient Active Problem List    Diagnosis Date Noted     PAIN HIP JOINT 09/02/2015     Priority: Medium     PAIN JOINT, SHOULDER 07/07/2015     Priority: Medium     Other postprocedural status(V45.89) 07/07/2015     " Priority: Medium       PERTINENT PAST MEDICAL HISTORY:  Past Medical History:   Diagnosis Date     Arthritis        PREVIOUS SURGERIES:  Past Surgical History:   Procedure Laterality Date     EXTRACTION(S) DENTAL       KNEE SURGERY      bilateral; partial meniscus removal     TONSILLECTOMY           ALLERGIES:   No Known Allergies    PERTINENT MEDICATIONS:  Current Outpatient Medications   Medication     Pediatric Multivitamins-Fl (MULTIVITAMIN WITH 1 MG FLUORIDE) 1 MG CHEW     aspirin (ASA) 81 MG chewable tablet     guaiFENesin-codeine (ROBITUSSIN AC) 100-10 MG/5ML solution     OMEPRAZOLE PO     vitamin D3 (CHOLECALCIFEROL) 2000 units tablet     No current facility-administered medications for this visit.        SOCIAL HISTORY:  Social History     Socioeconomic History     Marital status:      Spouse name: Not on file     Number of children: Not on file     Years of education: Not on file     Highest education level: Not on file   Occupational History     Not on file   Social Needs     Financial resource strain: Not on file     Food insecurity:     Worry: Not on file     Inability: Not on file     Transportation needs:     Medical: Not on file     Non-medical: Not on file   Tobacco Use     Smoking status: Former Smoker     Smokeless tobacco: Never Used   Substance and Sexual Activity     Alcohol use: Yes     Comment: 6 drinks a week      Drug use: No     Sexual activity: Yes     Partners: Female   Lifestyle     Physical activity:     Days per week: Not on file     Minutes per session: Not on file     Stress: Not on file   Relationships     Social connections:     Talks on phone: Not on file     Gets together: Not on file     Attends Roman Catholic service: Not on file     Active member of club or organization: Not on file     Attends meetings of clubs or organizations: Not on file     Relationship status: Not on file     Intimate partner violence:     Fear of current or ex partner: Not on file     Emotionally  "abused: Not on file     Physically abused: Not on file     Forced sexual activity: Not on file   Other Topics Concern     Parent/sibling w/ CABG, MI or angioplasty before 65F 55M? No   Social History Narrative     Not on file       FAMILY HISTORY:  Family History   Problem Relation Age of Onset     Dementia Mother      Arthritis Mother      Pacemaker Father      Past/family/social history reviewed and no changes    PHYSICAL EXAMINATION:  Vitals /79 (BP Location: Left arm, Patient Position: Chair, Cuff Size: Adult Regular)   Pulse (!) 49   Ht 1.826 m (5' 11.9\")   Wt 84.5 kg (186 lb 4.8 oz)   SpO2 99%   BMI 25.34 kg/m     Wt   Wt Readings from Last 2 Encounters:   07/31/19 84.5 kg (186 lb 4.8 oz)   07/02/19 82.3 kg (181 lb 8 oz)   Gen: Pt sitting up in NAD, interactive and cooperative on exam  Eyes: sclerae anicteric, no injection  ENT:  OP clear, MMM  Cardiac: RRR, nl S1, S2  Resp: Clear on anterior exam  GI: Normoactive BS, abd soft, flat, nontender  Skin: Warm, dry, no jaundice, nails appear healthy  Lymph: no submandibular, no cervical, and no supraclavicular LAD  Neuro: alert, oriented, answers questions appropriately      PERTINENT STUDIES:  Office Visit on 07/02/2019   Component Date Value Ref Range Status     WBC 07/02/2019 6.6  4.0 - 11.0 10e9/L Final     RBC Count 07/02/2019 4.28* 4.4 - 5.9 10e12/L Final     Hemoglobin 07/02/2019 13.7  13.3 - 17.7 g/dL Final     Hematocrit 07/02/2019 41.3  40.0 - 53.0 % Final     MCV 07/02/2019 97  78 - 100 fl Final     MCH 07/02/2019 32.0  26.5 - 33.0 pg Final     MCHC 07/02/2019 33.2  31.5 - 36.5 g/dL Final     RDW 07/02/2019 12.8  10.0 - 15.0 % Final     Platelet Count 07/02/2019 267  150 - 450 10e9/L Final     CRP Inflammation 07/02/2019 <2.9  0.0 - 8.0 mg/L Final     Sed Rate 07/02/2019 5  0 - 20 mm/h Final     Vitamin D Deficiency screening 07/02/2019 35  20 - 75 ug/L Final     Sodium 07/02/2019 141  133 - 144 mmol/L Final     Potassium 07/02/2019 5.0  3.4 - " 5.3 mmol/L Final     Chloride 07/02/2019 108  94 - 109 mmol/L Final     Carbon Dioxide 07/02/2019 27  20 - 32 mmol/L Final     Anion Gap 07/02/2019 6  3 - 14 mmol/L Final     Glucose 07/02/2019 94  70 - 99 mg/dL Final     Urea Nitrogen 07/02/2019 17  7 - 30 mg/dL Final     Creatinine 07/02/2019 0.98  0.66 - 1.25 mg/dL Final     GFR Estimate 07/02/2019 78  >60 mL/min/[1.73_m2] Final     GFR Estimate If Black 07/02/2019 >90  >60 mL/min/[1.73_m2] Final     Calcium 07/02/2019 8.8  8.5 - 10.1 mg/dL Final     Bilirubin Total 07/02/2019 0.8  0.2 - 1.3 mg/dL Final     Albumin 07/02/2019 3.6  3.4 - 5.0 g/dL Final     Protein Total 07/02/2019 6.5* 6.8 - 8.8 g/dL Final     Alkaline Phosphatase 07/02/2019 58  40 - 150 U/L Final     ALT 07/02/2019 17  0 - 70 U/L Final     AST 07/02/2019 10  0 - 45 U/L Final

## 2019-08-06 ENCOUNTER — HOSPITAL ENCOUNTER (OUTPATIENT)
Facility: AMBULATORY SURGERY CENTER | Age: 69
End: 2019-08-06
Attending: INTERNAL MEDICINE
Payer: COMMERCIAL

## 2019-08-06 VITALS
HEART RATE: 48 BPM | TEMPERATURE: 98.1 F | BODY MASS INDEX: 23.84 KG/M2 | SYSTOLIC BLOOD PRESSURE: 121 MMHG | RESPIRATION RATE: 16 BRPM | HEIGHT: 72 IN | WEIGHT: 176 LBS | OXYGEN SATURATION: 93 % | DIASTOLIC BLOOD PRESSURE: 73 MMHG

## 2019-08-06 LAB — UPPER GI ENDOSCOPY: NORMAL

## 2019-08-06 RX ORDER — ONDANSETRON 4 MG/1
4 TABLET, ORALLY DISINTEGRATING ORAL EVERY 6 HOURS PRN
Status: DISCONTINUED | OUTPATIENT
Start: 2019-08-06 | End: 2019-08-07 | Stop reason: HOSPADM

## 2019-08-06 RX ORDER — LIDOCAINE 40 MG/G
CREAM TOPICAL
Status: DISCONTINUED | OUTPATIENT
Start: 2019-08-06 | End: 2019-08-06 | Stop reason: HOSPADM

## 2019-08-06 RX ORDER — ONDANSETRON 2 MG/ML
4 INJECTION INTRAMUSCULAR; INTRAVENOUS
Status: DISCONTINUED | OUTPATIENT
Start: 2019-08-06 | End: 2019-08-06 | Stop reason: HOSPADM

## 2019-08-06 RX ORDER — ONDANSETRON 2 MG/ML
4 INJECTION INTRAMUSCULAR; INTRAVENOUS EVERY 6 HOURS PRN
Status: DISCONTINUED | OUTPATIENT
Start: 2019-08-06 | End: 2019-08-07 | Stop reason: HOSPADM

## 2019-08-06 RX ORDER — FLUMAZENIL 0.1 MG/ML
0.2 INJECTION, SOLUTION INTRAVENOUS
Status: ACTIVE | OUTPATIENT
Start: 2019-08-06 | End: 2019-08-06

## 2019-08-06 RX ORDER — FENTANYL CITRATE 50 UG/ML
INJECTION, SOLUTION INTRAMUSCULAR; INTRAVENOUS PRN
Status: DISCONTINUED | OUTPATIENT
Start: 2019-08-06 | End: 2019-08-06 | Stop reason: HOSPADM

## 2019-08-06 RX ORDER — NALOXONE HYDROCHLORIDE 0.4 MG/ML
.1-.4 INJECTION, SOLUTION INTRAMUSCULAR; INTRAVENOUS; SUBCUTANEOUS
Status: DISCONTINUED | OUTPATIENT
Start: 2019-08-06 | End: 2019-08-07 | Stop reason: HOSPADM

## 2019-08-06 ASSESSMENT — MIFFLIN-ST. JEOR: SCORE: 1601.33

## 2019-08-08 LAB — COPATH REPORT: NORMAL

## 2019-08-16 DIAGNOSIS — K21.00 GASTROESOPHAGEAL REFLUX DISEASE WITH ESOPHAGITIS: Primary | ICD-10-CM

## 2019-08-16 RX ORDER — OMEPRAZOLE 40 MG/1
40 CAPSULE, DELAYED RELEASE ORAL 2 TIMES DAILY
Qty: 180 CAPSULE | Refills: 0 | Status: SHIPPED | OUTPATIENT
Start: 2019-08-16 | End: 2020-02-03

## 2019-10-01 ENCOUNTER — OFFICE VISIT (OUTPATIENT)
Dept: FAMILY MEDICINE | Facility: CLINIC | Age: 69
End: 2019-10-01
Payer: COMMERCIAL

## 2019-10-01 VITALS
DIASTOLIC BLOOD PRESSURE: 58 MMHG | HEIGHT: 72 IN | RESPIRATION RATE: 16 BRPM | HEART RATE: 54 BPM | WEIGHT: 177 LBS | SYSTOLIC BLOOD PRESSURE: 100 MMHG | OXYGEN SATURATION: 95 % | TEMPERATURE: 98.1 F | BODY MASS INDEX: 23.98 KG/M2

## 2019-10-01 DIAGNOSIS — E55.9 VITAMIN D DEFICIENCY: ICD-10-CM

## 2019-10-01 DIAGNOSIS — Z23 NEED FOR PROPHYLACTIC VACCINATION AND INOCULATION AGAINST INFLUENZA: ICD-10-CM

## 2019-10-01 DIAGNOSIS — F32.1 CURRENT MODERATE EPISODE OF MAJOR DEPRESSIVE DISORDER WITHOUT PRIOR EPISODE (H): Primary | ICD-10-CM

## 2019-10-01 LAB
DEPRECATED CALCIDIOL+CALCIFEROL SERPL-MC: 29 UG/L (ref 20–75)
ERYTHROCYTE [DISTWIDTH] IN BLOOD BY AUTOMATED COUNT: 13.1 % (ref 10–15)
HCT VFR BLD AUTO: 40.2 % (ref 40–53)
HGB BLD-MCNC: 13.4 G/DL (ref 13.3–17.7)
MCH RBC QN AUTO: 31.7 PG (ref 26.5–33)
MCHC RBC AUTO-ENTMCNC: 33.3 G/DL (ref 31.5–36.5)
MCV RBC AUTO: 95 FL (ref 78–100)
PLATELET # BLD AUTO: 262 10E9/L (ref 150–450)
RBC # BLD AUTO: 4.23 10E12/L (ref 4.4–5.9)
TSH SERPL DL<=0.005 MIU/L-ACNC: 1.56 MU/L (ref 0.4–4)
WBC # BLD AUTO: 5.5 10E9/L (ref 4–11)

## 2019-10-01 PROCEDURE — 90662 IIV NO PRSV INCREASED AG IM: CPT | Performed by: FAMILY MEDICINE

## 2019-10-01 PROCEDURE — G0008 ADMIN INFLUENZA VIRUS VAC: HCPCS | Performed by: FAMILY MEDICINE

## 2019-10-01 PROCEDURE — 84443 ASSAY THYROID STIM HORMONE: CPT | Performed by: FAMILY MEDICINE

## 2019-10-01 PROCEDURE — 36415 COLL VENOUS BLD VENIPUNCTURE: CPT | Performed by: FAMILY MEDICINE

## 2019-10-01 PROCEDURE — 82306 VITAMIN D 25 HYDROXY: CPT | Performed by: FAMILY MEDICINE

## 2019-10-01 PROCEDURE — 85027 COMPLETE CBC AUTOMATED: CPT | Performed by: FAMILY MEDICINE

## 2019-10-01 PROCEDURE — 99214 OFFICE O/P EST MOD 30 MIN: CPT | Mod: 25 | Performed by: FAMILY MEDICINE

## 2019-10-01 RX ORDER — BUPROPION HYDROCHLORIDE 150 MG/1
TABLET, EXTENDED RELEASE ORAL
Qty: 60 TABLET | Refills: 0 | Status: SHIPPED | OUTPATIENT
Start: 2019-10-01 | End: 2019-10-30

## 2019-10-01 ASSESSMENT — ANXIETY QUESTIONNAIRES
7. FEELING AFRAID AS IF SOMETHING AWFUL MIGHT HAPPEN: NOT AT ALL
2. NOT BEING ABLE TO STOP OR CONTROL WORRYING: MORE THAN HALF THE DAYS
3. WORRYING TOO MUCH ABOUT DIFFERENT THINGS: NEARLY EVERY DAY
GAD7 TOTAL SCORE: 7
5. BEING SO RESTLESS THAT IT IS HARD TO SIT STILL: NOT AT ALL
IF YOU CHECKED OFF ANY PROBLEMS ON THIS QUESTIONNAIRE, HOW DIFFICULT HAVE THESE PROBLEMS MADE IT FOR YOU TO DO YOUR WORK, TAKE CARE OF THINGS AT HOME, OR GET ALONG WITH OTHER PEOPLE: SOMEWHAT DIFFICULT
1. FEELING NERVOUS, ANXIOUS, OR ON EDGE: NOT AT ALL
6. BECOMING EASILY ANNOYED OR IRRITABLE: SEVERAL DAYS

## 2019-10-01 ASSESSMENT — PATIENT HEALTH QUESTIONNAIRE - PHQ9
SUM OF ALL RESPONSES TO PHQ QUESTIONS 1-9: 13
5. POOR APPETITE OR OVEREATING: SEVERAL DAYS

## 2019-10-01 ASSESSMENT — MIFFLIN-ST. JEOR: SCORE: 1605.87
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
## 2019-10-01 NOTE — PATIENT INSTRUCTIONS
shingrix - get it through pharmacy.     Pneumovax 23 - after Oct 24,2019.    Call clinic or send message in a month or see me in a month for depression follow up.

## 2019-10-02 ASSESSMENT — ANXIETY QUESTIONNAIRES: GAD7 TOTAL SCORE: 7

## 2019-10-03 ENCOUNTER — HEALTH MAINTENANCE LETTER (OUTPATIENT)
Age: 69
End: 2019-10-03

## 2019-10-04 ENCOUNTER — TELEPHONE (OUTPATIENT)
Dept: GASTROENTEROLOGY | Facility: CLINIC | Age: 69
End: 2019-10-04

## 2019-10-04 NOTE — TELEPHONE ENCOUNTER
Called and left message for patient reminding of appointment scheduled on 10/819 at 5pm with [facility]. Patient to arrive 15 min early. To reschedule or cancel patient to call [phone].    JESSICA Newman

## 2019-10-08 ENCOUNTER — OFFICE VISIT (OUTPATIENT)
Dept: GASTROENTEROLOGY | Facility: CLINIC | Age: 69
End: 2019-10-08
Payer: COMMERCIAL

## 2019-10-08 VITALS
DIASTOLIC BLOOD PRESSURE: 68 MMHG | SYSTOLIC BLOOD PRESSURE: 114 MMHG | BODY MASS INDEX: 24.03 KG/M2 | TEMPERATURE: 97.9 F | RESPIRATION RATE: 16 BRPM | HEART RATE: 62 BPM | OXYGEN SATURATION: 97 % | HEIGHT: 73 IN | WEIGHT: 181.3 LBS

## 2019-10-08 DIAGNOSIS — R05.9 COUGH: ICD-10-CM

## 2019-10-08 DIAGNOSIS — R10.12 LUQ ABDOMINAL PAIN: ICD-10-CM

## 2019-10-08 DIAGNOSIS — R09.89 PHLEGM IN THROAT: ICD-10-CM

## 2019-10-08 SDOH — HEALTH STABILITY: MENTAL HEALTH: HOW MANY STANDARD DRINKS CONTAINING ALCOHOL DO YOU HAVE ON A TYPICAL DAY?: 3 OR 4

## 2019-10-08 SDOH — HEALTH STABILITY: MENTAL HEALTH: HOW OFTEN DO YOU HAVE 6 OR MORE DRINKS ON ONE OCCASION?: WEEKLY

## 2019-10-08 SDOH — HEALTH STABILITY: MENTAL HEALTH: HOW OFTEN DO YOU HAVE A DRINK CONTAINING ALCOHOL?: 2-3 TIMES A WEEK

## 2019-10-08 ASSESSMENT — MIFFLIN-ST. JEOR: SCORE: 1638.63

## 2019-10-08 ASSESSMENT — PAIN SCALES - GENERAL: PAINLEVEL: NO PAIN (0)

## 2019-10-08 NOTE — LETTER
10/8/2019       RE: Neptali Moore  [address] [facility] [address]     Dear Colleague,    Thank you for referring your patient, Neptali Moore, to the [facility]STRO[facility]OLOG[facility] at [facility]. Please see a copy of my visit note below.    GI CLINIC VISIT    CC/REFERRING MD:  Mark Jones  REASON FOR CONSULTATION: follow-up     ASSESSMENT/PLAN:  1.  Sensation of phlegm in throat, cough, LUQ abdominal pain   Recent EGD with stomach and duodenal biopsies with mild inflammation, no evidence of ulcerative disease, esophagitis, H. pylori or celiac disease.  Had been started on high-dose PPI for about 8 weeks without significant improvement in symptoms.  Discussed he can try tapering down to once a day and then off entirely while monitoring symptoms.  If no worsening symptoms, can stay off PPI entirely. In the meantime, should continue to follow lifestyle modifications for GERD. He should continue to discuss phlegm/regurgitation with primary care provider.  Could also consider doing a 2-week dairy free trial monitoring symptoms.  We will also see if symptoms improve after starting Wellbutrin.  Could consider meeting with our GI health psychologist to discuss possible mind gut connection.     Patient's LUQ pain did not improve significantly on PPI, unclear if this is related to EGD findings. He was instructed to monitor symptoms. Future considerations include trial of anti-spasmodic if worsening symptoms.     Plan:    GERD Lifestyle Modifications.  -- Avoid foods high in fat or high fructose corn syrup  -- do not eat within three hours of laying down or going to bed  -- raise the head of your bed 6-8 inches  -- avoid alcohol    -- decrease to Prilosec 40 mg once a day, if symptoms do not worsen can discontinue entirely     -- could consider cutting out dairy for two weeks     -- track pain symptoms    -- could consider meeting with our GI health  "psychologists     -- avoid NSAIDs    Colorectal cancer screening: history of TA and SSA- repeat in 2021       Thank you for this consultation.  It was a pleasure to participate in the care of this patient; please contact us with any further questions.  A total of 25 face-to-face minutes was spent with this patient, >50% of which was counseling regarding the above delineated issues.    Elena Palmer PA-C  Division of Gastroenterology, Hepatology & Nutrition  [facility]    HPI:  Mr. Flood is a 69 year old male with joint pains who presents for evaluation of \"regurgitation\" and early satiety. Has previously been seen by Dr. Youngblood and this is my first encounter with the patient.     At his initial visit, patient noted regurgitation which he describes as increased phlegm in the back of his throat, which he could bring up.  Reports rare GERD symptoms, infrequent dysphasia.  Has been seen by ENT for symptoms and treated for allergic rhinitis and postnasal drip without improvement in symptoms.  Had a normal CT sinus, found to have deviated septum.  Has been treated with guanfacine with codeine which helped his symptoms.  Had also previously tried 2-week course of PPI without improvement in symptoms.      Also reported nausea in the morning, and early satiety and lack of appetite.  Reported occasional left upper quadrant abdominal \"fullness\" which could be associated with early satiety.    After last visit upper endoscopy and was found to have nonbleeding erosive gastropathy, duodenal biopsies peptic duodenitis, stomach biopsies negative for H. pylori with active gastropathy, esophageal biopsies within normal limits.    Patient was then started on high-dose PPI twice daily with Prilosec 40mg which she is taking in the morning and before bed.  Has also elevated the head of his bed and is drinking less alcohol.  Thought that the phlegm in the back of his throat initially improved a little bit, however he " has had increased symptoms in the past couple of days.    Continues to have rare left upper quadrant abdominal pain, has happened 3 times in the last 3 months and lasts for an hour or so.     Bowel movements continue to be loose, has been a little more formula past 5 to 6 days.  Of note, his started on Wellbutrin recently, he is wondering if this is contributing to improvement in symptoms.    Appetitie is decreased in the morning, not until the evening       ROS:    Flatus: feels this has increased off due to IBS  Abdominal pain: see HPI  Weight loss:-is down about 15 lbs, appetitie     Anxiety/depression: none - sees a counselor for baseline mood issues- recently started on Wellbutrin     PROBLEM LIST  Patient Active Problem List    Diagnosis Date Noted     Current moderate episode of major depressive disorder without prior episode (H) 10/01/2019     Priority: Medium     PAIN HIP JOINT 09/02/2015     Priority: Medium     PAIN JOINT, SHOULDER 07/07/2015     Priority: Medium     Other postprocedural status(V45.89) 07/07/2015     Priority: Medium       PERTINENT PAST MEDICAL HISTORY:  Past Medical History:   Diagnosis Date     Arthritis        PREVIOUS SURGERIES:  Past Surgical History:   Procedure Laterality Date     ESOPHAGOSCOPY, GASTROSCOPY, DUODENOSCOPY (EGD), COMBINED N/A 8/6/2019    Procedure: ESOPHAGOGASTRODUODENOSCOPY, WITH BIOPSY;  Surgeon: Oliva Youngblood MD;  Location: [facility] OR     EXTRACTION(S) DENTAL       KNEE SURGERY      bilateral; partial meniscus removal     TONSILLECTOMY       ENDOSCOPY:  EGD 2019:  Impression:          - Ectopic gastric mucosa in the upper third of the                        esophagus.                        - Z-line regular, 39 cm from the incisors.                        - Non-bleeding erosive gastropathy.                        - Normal examined duodenum. Biopsied.                        - Biopsies were taken with a cold forceps for histology                         in the mid esophagus and in the distal esophagus.                        - Biopsies were taken with a cold forceps for histology                        in the gastric body, at the incisura and in the gastric                        antrum.     A. Duodenum, Biopsy:   Duodenitis with active neutrophilic infiltration and foveolar metaplasia,   consistent with peptic duodenitis     B. Stomach, Antrum and Body, Biopsy:   Body/fundic mucosa with reactive changes; negative for intestinal   metaplasia or H. pylori-like organisms on   routine staining  (See Comment)     C. Distal Esophagus, Biopsy:   Normal squamous mucosa without significant histologic abnormality     D. Mid Esophagus, Biopsy:   Normal squamous mucosa without significant histologic abnormality       ALLERGIES:   No Known Allergies    PERTINENT MEDICATIONS:  Current Outpatient Medications   Medication     buPROPion (WELLBUTRIN SR) 150 MG 12 hr tablet     Multiple Vitamins-Minerals (MULTIVITAMIN MEN 50+) TABS     omeprazole (PRILOSEC) 40 MG DR capsule     vitamin D3 (CHOLECALCIFEROL) 2000 units tablet     No current facility-administered medications for this visit.        SOCIAL HISTORY:  Has cut back on alcohol   Social History     Socioeconomic History     Marital status:      Spouse name: Not on file     Number of children: Not on file     Years of education: Not on file     Highest education level: Not on file   Occupational History     Not on file   Social Needs     Financial resource strain: Not on file     Food insecurity:     Worry: Not on file     Inability: Not on file     Transportation needs:     Medical: Not on file     Non-medical: Not on file   Tobacco Use     Smoking status: Never Smoker     Smokeless tobacco: Never Used   Substance and Sexual Activity     Alcohol use: Yes     Alcohol/week: 3.0 - 6.0 standard drinks     Types: 1 - 2 Glasses of wine, 1 - 2 Cans of beer, 1 - 2 Shots of liquor per week     Frequency: 2-3 times a week      "Drinks per session: 3 or 4     Binge frequency: Weekly     Comment: 2-3 drinks/week     Drug use: No     Sexual activity: Yes     Partners: Female   Lifestyle     Physical activity:     Days per week: Not on file     Minutes per session: Not on file     Stress: Not on file   Relationships     Social connections:     Talks on phone: Not on file     Gets together: Not on file     Attends Congregation service: Not on file     Active member of club or organization: Not on file     Attends meetings of clubs or organizations: Not on file     Relationship status: Not on file     Intimate partner violence:     Fear of current or ex partner: Not on file     Emotionally abused: Not on file     Physically abused: Not on file     Forced sexual activity: Not on file   Other Topics Concern     Parent/sibling w/ CABG, MI or angioplasty before 65F 55M? No   Social History Narrative     Not on file       FAMILY HISTORY:  Family History   Problem Relation Age of Onset     Dementia Mother      Arthritis Mother      Pacemaker Father      Past/family/social history reviewed and no changes    PHYSICAL EXAMINATION:  Vitals /68 (BP Location: Left arm, Patient Position: Sitting, Cuff Size: Adult Regular)   Pulse 62   Temp 97.9  F (36.6  C) (Oral)   Resp 16   Ht 1.85 m (6' 0.84\")   Wt 82.2 kg (181 lb 4.8 oz)   SpO2 97%   BMI 24.03 kg/m      Wt   Wt Readings from Last 2 Encounters:   10/08/19 82.2 kg (181 lb 4.8 oz)   10/01/19 80.3 kg (177 lb)   Gen: Pt sitting up in NAD, interactive and cooperative on exam  Eyes: sclerae anicteric, no injection  GI: abd soft, flat, nontender  Skin: Warm, dry, no jaundice, nails appear healthy  Lymph: no submandibular, no cervical, and no supraclavicular LAD  Neuro: alert, oriented, answers questions appropriately    PERTINENT STUDIES:  Office Visit on 07/02/2019   Component Date Value Ref Range Status     WBC 07/02/2019 6.6  4.0 - 11.0 10e9/L Final     RBC Count 07/02/2019 4.28* 4.4 - 5.9 10e12/L " Final     Hemoglobin 07/02/2019 13.7  13.3 - 17.7 g/dL Final     Hematocrit 07/02/2019 41.3  40.0 - 53.0 % Final     MCV 07/02/2019 97  78 - 100 fl Final     MCH 07/02/2019 32.0  26.5 - 33.0 pg Final     MCHC 07/02/2019 33.2  31.5 - 36.5 g/dL Final     RDW 07/02/2019 12.8  10.0 - 15.0 % Final     Platelet Count 07/02/2019 267  150 - 450 10e9/L Final     CRP Inflammation 07/02/2019 <2.9  0.0 - 8.0 mg/L Final     Sed Rate 07/02/2019 5  0 - 20 mm/h Final     Vitamin D Deficiency screening 07/02/2019 35  20 - 75 ug/L Final     Sodium 07/02/2019 141  133 - 144 mmol/L Final     Potassium 07/02/2019 5.0  3.4 - 5.3 mmol/L Final     Chloride 07/02/2019 108  94 - 109 mmol/L Final     Carbon Dioxide 07/02/2019 27  20 - 32 mmol/L Final     Anion Gap 07/02/2019 6  3 - 14 mmol/L Final     Glucose 07/02/2019 94  70 - 99 mg/dL Final     Urea Nitrogen 07/02/2019 17  7 - 30 mg/dL Final     Creatinine 07/02/2019 0.98  0.66 - 1.25 mg/dL Final     GFR Estimate 07/02/2019 78  >60 mL/min/[1.73_m2] Final     GFR Estimate If Black 07/02/2019 >90  >60 mL/min/[1.73_m2] Final     Calcium 07/02/2019 8.8  8.5 - 10.1 mg/dL Final     Bilirubin Total 07/02/2019 0.8  0.2 - 1.3 mg/dL Final     Albumin 07/02/2019 3.6  3.4 - 5.0 g/dL Final     Protein Total 07/02/2019 6.5* 6.8 - 8.8 g/dL Final     Alkaline Phosphatase 07/02/2019 58  40 - 150 U/L Final     ALT 07/02/2019 17  0 - 70 U/L Final     AST 07/02/2019 10  0 - 45 U/L Final       Again, thank you for allowing me to participate in the care of your patient.     Elena Palmer PA-C

## 2019-10-08 NOTE — PROGRESS NOTES
GI CLINIC VISIT    CC/REFERRING MD:  Mark Jones  REASON FOR CONSULTATION: follow-up       ASSESSMENT/PLAN:  1.  Sensation of phlegm in throat, cough, LUQ abdominal pain   Recent EGD with stomach and duodenal biopsies with mild inflammation, no evidence of ulcerative disease, esophagitis, H. pylori or celiac disease.  Had been started on high-dose PPI for about 8 weeks without significant improvement in symptoms.  Discussed he can try tapering down to once a day and then off entirely while monitoring symptoms.  If no worsening symptoms, can stay off PPI entirely. In the meantime, should continue to follow lifestyle modifications for GERD. He should continue to discuss phlegm/regurgitation with primary care provider.  Could also consider doing a 2-week dairy free trial monitoring symptoms.  We will also see if symptoms improve after starting Wellbutrin.  Could consider meeting with our GI health psychologist to discuss possible mind gut connection.     Patient's LUQ pain did not improve significantly on PPI, unclear if this is related to EGD findings. He was instructed to monitor symptoms. Future considerations include trial of anti-spasmodic if worsening symptoms.     Plan:    GERD Lifestyle Modifications.  -- Avoid foods high in fat or high fructose corn syrup  -- do not eat within three hours of laying down or going to bed  -- raise the head of your bed 6-8 inches  -- avoid alcohol    -- decrease to Prilosec 40 mg once a day, if symptoms do not worsen can discontinue entirely     -- could consider cutting out dairy for two weeks     -- track pain symptoms    -- could consider meeting with our GI health psychologists     -- avoid NSAIDs    Colorectal cancer screening: history of TA and SSA- repeat in 2021       Thank you for this consultation.  It was a pleasure to participate in the care of this patient; please contact us with any further questions.  A total of 25 face-to-face minutes was spent with  "this patient, >50% of which was counseling regarding the above delineated issues.    Elena Palmer PA-C  Division of Gastroenterology, Hepatology & Nutrition  [facility]    HPI:  Mr. Flood is a 69 year old male with joint pains who presents for evaluation of \"regurgitation\" and early satiety. Has previously been seen by Dr. Youngblood and this is my first encounter with the patient.     At his initial visit, patient noted regurgitation which he describes as increased phlegm in the back of his throat, which he could bring up.  Reports rare GERD symptoms, infrequent dysphasia.  Has been seen by ENT for symptoms and treated for allergic rhinitis and postnasal drip without improvement in symptoms.  Had a normal CT sinus, found to have deviated septum.  Has been treated with guanfacine with codeine which helped his symptoms.  Had also previously tried 2-week course of PPI without improvement in symptoms.      Also reported nausea in the morning, and early satiety and lack of appetite.  Reported occasional left upper quadrant abdominal \"fullness\" which could be associated with early satiety.    After last visit upper endoscopy and was found to have nonbleeding erosive gastropathy, duodenal biopsies peptic duodenitis, stomach biopsies negative for H. pylori with active gastropathy, esophageal biopsies within normal limits.    Patient was then started on high-dose PPI twice daily with Prilosec 40mg which she is taking in the morning and before bed.  Has also elevated the head of his bed and is drinking less alcohol.  Thought that the phlegm in the back of his throat initially improved a little bit, however he has had increased symptoms in the past couple of days.    Continues to have rare left upper quadrant abdominal pain, has happened 3 times in the last 3 months and lasts for an hour or so.     Bowel movements continue to be loose, has been a little more formula past 5 to 6 days.  Of note, his started on " Wellbutrin recently, he is wondering if this is contributing to improvement in symptoms.    Appetitie is decreased in the morning, not until the evening       ROS:    Flatus: feels this has increased off due to IBS  Abdominal pain: see HPI  Weight loss:-is down about 15 lbs, appetitie     Anxiety/depression: none - sees a counselor for baseline mood issues- recently started on Wellbutrin     PROBLEM LIST  Patient Active Problem List    Diagnosis Date Noted     Current moderate episode of major depressive disorder without prior episode (H) 10/01/2019     Priority: Medium     PAIN HIP JOINT 09/02/2015     Priority: Medium     PAIN JOINT, SHOULDER 07/07/2015     Priority: Medium     Other postprocedural status(V45.89) 07/07/2015     Priority: Medium       PERTINENT PAST MEDICAL HISTORY:  Past Medical History:   Diagnosis Date     Arthritis        PREVIOUS SURGERIES:  Past Surgical History:   Procedure Laterality Date     ESOPHAGOSCOPY, GASTROSCOPY, DUODENOSCOPY (EGD), COMBINED N/A 8/6/2019    Procedure: ESOPHAGOGASTRODUODENOSCOPY, WITH BIOPSY;  Surgeon: Oliva Youngblood MD;  Location: [facility] OR     EXTRACTION(S) DENTAL       KNEE SURGERY      bilateral; partial meniscus removal     TONSILLECTOMY       ENDOSCOPY:  EGD 2019:  Impression:          - Ectopic gastric mucosa in the upper third of the                        esophagus.                        - Z-line regular, 39 cm from the incisors.                        - Non-bleeding erosive gastropathy.                        - Normal examined duodenum. Biopsied.                        - Biopsies were taken with a cold forceps for histology                        in the mid esophagus and in the distal esophagus.                        - Biopsies were taken with a cold forceps for histology                        in the gastric body, at the incisura and in the gastric                        antrum.     A. Duodenum, Biopsy:   Duodenitis with active neutrophilic  infiltration and foveolar metaplasia,   consistent with peptic duodenitis     B. Stomach, Antrum and Body, Biopsy:   Body/fundic mucosa with reactive changes; negative for intestinal   metaplasia or H. pylori-like organisms on   routine staining  (See Comment)     C. Distal Esophagus, Biopsy:   Normal squamous mucosa without significant histologic abnormality     D. Mid Esophagus, Biopsy:   Normal squamous mucosa without significant histologic abnormality       ALLERGIES:   No Known Allergies    PERTINENT MEDICATIONS:  Current Outpatient Medications   Medication     buPROPion (WELLBUTRIN SR) 150 MG 12 hr tablet     Multiple Vitamins-Minerals (MULTIVITAMIN MEN 50+) TABS     omeprazole (PRILOSEC) 40 MG DR capsule     vitamin D3 (CHOLECALCIFEROL) 2000 units tablet     No current facility-administered medications for this visit.        SOCIAL HISTORY:  Has cut back on alcohol   Social History     Socioeconomic History     Marital status:      Spouse name: Not on file     Number of children: Not on file     Years of education: Not on file     Highest education level: Not on file   Occupational History     Not on file   Social Needs     Financial resource strain: Not on file     Food insecurity:     Worry: Not on file     Inability: Not on file     Transportation needs:     Medical: Not on file     Non-medical: Not on file   Tobacco Use     Smoking status: Never Smoker     Smokeless tobacco: Never Used   Substance and Sexual Activity     Alcohol use: Yes     Alcohol/week: 3.0 - 6.0 standard drinks     Types: 1 - 2 Glasses of wine, 1 - 2 Cans of beer, 1 - 2 Shots of liquor per week     Frequency: 2-3 times a week     Drinks per session: 3 or 4     Binge frequency: Weekly     Comment: 2-3 drinks/week     Drug use: No     Sexual activity: Yes     Partners: Female   Lifestyle     Physical activity:     Days per week: Not on file     Minutes per session: Not on file     Stress: Not on file   Relationships     Social  "connections:     Talks on phone: Not on file     Gets together: Not on file     Attends Yarsani service: Not on file     Active member of club or organization: Not on file     Attends meetings of clubs or organizations: Not on file     Relationship status: Not on file     Intimate partner violence:     Fear of current or ex partner: Not on file     Emotionally abused: Not on file     Physically abused: Not on file     Forced sexual activity: Not on file   Other Topics Concern     Parent/sibling w/ CABG, MI or angioplasty before 65F 55M? No   Social History Narrative     Not on file       FAMILY HISTORY:  Family History   Problem Relation Age of Onset     Dementia Mother      Arthritis Mother      Pacemaker Father      Past/family/social history reviewed and no changes    PHYSICAL EXAMINATION:  Vitals /68 (BP Location: Left arm, Patient Position: Sitting, Cuff Size: Adult Regular)   Pulse 62   Temp 97.9  F (36.6  C) (Oral)   Resp 16   Ht 1.85 m (6' 0.84\")   Wt 82.2 kg (181 lb 4.8 oz)   SpO2 97%   BMI 24.03 kg/m     Wt   Wt Readings from Last 2 Encounters:   10/08/19 82.2 kg (181 lb 4.8 oz)   10/01/19 80.3 kg (177 lb)   Gen: Pt sitting up in NAD, interactive and cooperative on exam  Eyes: sclerae anicteric, no injection  GI: abd soft, flat, nontender  Skin: Warm, dry, no jaundice, nails appear healthy  Lymph: no submandibular, no cervical, and no supraclavicular LAD  Neuro: alert, oriented, answers questions appropriately    PERTINENT STUDIES:  Office Visit on 07/02/2019   Component Date Value Ref Range Status     WBC 07/02/2019 6.6  4.0 - 11.0 10e9/L Final     RBC Count 07/02/2019 4.28* 4.4 - 5.9 10e12/L Final     Hemoglobin 07/02/2019 13.7  13.3 - 17.7 g/dL Final     Hematocrit 07/02/2019 41.3  40.0 - 53.0 % Final     MCV 07/02/2019 97  78 - 100 fl Final     MCH 07/02/2019 32.0  26.5 - 33.0 pg Final     MCHC 07/02/2019 33.2  31.5 - 36.5 g/dL Final     RDW 07/02/2019 12.8  10.0 - 15.0 % Final     " Platelet Count 07/02/2019 267  150 - 450 10e9/L Final     CRP Inflammation 07/02/2019 <2.9  0.0 - 8.0 mg/L Final     Sed Rate 07/02/2019 5  0 - 20 mm/h Final     Vitamin D Deficiency screening 07/02/2019 35  20 - 75 ug/L Final     Sodium 07/02/2019 141  133 - 144 mmol/L Final     Potassium 07/02/2019 5.0  3.4 - 5.3 mmol/L Final     Chloride 07/02/2019 108  94 - 109 mmol/L Final     Carbon Dioxide 07/02/2019 27  20 - 32 mmol/L Final     Anion Gap 07/02/2019 6  3 - 14 mmol/L Final     Glucose 07/02/2019 94  70 - 99 mg/dL Final     Urea Nitrogen 07/02/2019 17  7 - 30 mg/dL Final     Creatinine 07/02/2019 0.98  0.66 - 1.25 mg/dL Final     GFR Estimate 07/02/2019 78  >60 mL/min/[1.73_m2] Final     GFR Estimate If Black 07/02/2019 >90  >60 mL/min/[1.73_m2] Final     Calcium 07/02/2019 8.8  8.5 - 10.1 mg/dL Final     Bilirubin Total 07/02/2019 0.8  0.2 - 1.3 mg/dL Final     Albumin 07/02/2019 3.6  3.4 - 5.0 g/dL Final     Protein Total 07/02/2019 6.5* 6.8 - 8.8 g/dL Final     Alkaline Phosphatase 07/02/2019 58  40 - 150 U/L Final     ALT 07/02/2019 17  0 - 70 U/L Final     AST 07/02/2019 10  0 - 45 U/L Final

## 2019-10-08 NOTE — PATIENT INSTRUCTIONS
It was a pleasure taking care of you today.  I've included a brief summary of our discussion and care plan from today's visit below.  Please review this information with your primary care provider.  ______________________________________________________________________    My recommendations are summarized as follows:    GERD Lifestyle Modifications  -- Avoid foods high in fat or high fructose corn syrup  -- do not eat within three hours of laying down or going to bed  -- raise the head of your bed 6-8 inches  -- avoid alcohol    -- decrease to Prilosec 40 mg once a day, if symptoms do not worsen can discontinue entirely     -- could consider cutting out dairy for two weeks     -- track pain symptoms    -- could consider meeting with our GI health psychologists     -- avoid NSAIDs    Return to GI Clinic as needed to review your progress.    ______________________________________________________________________    Who do I call with any questions after my visit?  Please be in touch if there are any further questions that arise following today's visit.  There are multiple ways to contact your gastroenterology care team.        During business hours, you may reach a Gastroenterology nurse at [phone]      To schedule or reschedule an appointment, please call [phone].       You can always send a secure message through Sorbisense.  Sorbisense messages are answered by your nurse or doctor typically within 24 hours.  Please allow extra time on weekends and holidays.        For urgent/emergent questions after business hours, you may reach the on-call GI Fellow by contacting the [facility] at [phone].     How will I get the results of any tests ordered?    You will receive all of your results.  If you have signed up for Sorbisense, any tests ordered at your visit will be available to you after your physician reviews them.  Typically this takes 1-2 weeks.  If there are urgent results that require a  change in your care plan, your physician or nurse will call you to discuss the next steps.      What is Tauliahart?  Ziebel is a secure way for you to access all of your healthcare records from the [facility].  It is a web based computer program, so you can sign on to it from any location.  It also allows you to send secure messages to your care team.  I recommend signing up for Ziebel access if you have not already done so and are comfortable with using a computer.      How to I schedule a follow-up visit?  If you did not schedule a follow-up visit today, please call [phone] to schedule a follow-up office visit.        Sincerely,    Elena Palmer PA-C  Division of Gastroenterology, Hepatology & Nutrition  [facility]

## 2019-10-08 NOTE — NURSING NOTE
"Chief Complaint   Patient presents with     Gastrointestinal Problem     F/U for Dysphagia       Vitals:    10/08/19 1702   BP: 114/68   BP Location: Left arm   Patient Position: Sitting   Cuff Size: Adult Regular   Pulse: 62   Resp: 16   Temp: 97.9  F (36.6  C)   TempSrc: Oral   SpO2: 97%   Weight: 82.2 kg (181 lb 4.8 oz)   Height: 1.85 m (6' 0.84\")       Body mass index is 24.03 kg/m .      Abby Garcia LPN                          "

## 2019-10-30 ENCOUNTER — OFFICE VISIT (OUTPATIENT)
Dept: FAMILY MEDICINE | Facility: CLINIC | Age: 69
End: 2019-10-30
Payer: COMMERCIAL

## 2019-10-30 VITALS
OXYGEN SATURATION: 100 % | BODY MASS INDEX: 23.23 KG/M2 | HEART RATE: 58 BPM | HEIGHT: 73 IN | WEIGHT: 175.25 LBS | TEMPERATURE: 97.5 F | DIASTOLIC BLOOD PRESSURE: 60 MMHG | SYSTOLIC BLOOD PRESSURE: 104 MMHG | RESPIRATION RATE: 16 BRPM

## 2019-10-30 DIAGNOSIS — F32.1 CURRENT MODERATE EPISODE OF MAJOR DEPRESSIVE DISORDER WITHOUT PRIOR EPISODE (H): Primary | ICD-10-CM

## 2019-10-30 DIAGNOSIS — M25.562 CHRONIC PAIN OF BOTH KNEES: ICD-10-CM

## 2019-10-30 DIAGNOSIS — G89.29 CHRONIC PAIN OF BOTH KNEES: ICD-10-CM

## 2019-10-30 DIAGNOSIS — M25.561 CHRONIC PAIN OF BOTH KNEES: ICD-10-CM

## 2019-10-30 PROCEDURE — G0009 ADMIN PNEUMOCOCCAL VACCINE: HCPCS | Performed by: FAMILY MEDICINE

## 2019-10-30 PROCEDURE — 99214 OFFICE O/P EST MOD 30 MIN: CPT | Mod: 25 | Performed by: FAMILY MEDICINE

## 2019-10-30 PROCEDURE — 90732 PPSV23 VACC 2 YRS+ SUBQ/IM: CPT | Performed by: FAMILY MEDICINE

## 2019-10-30 RX ORDER — BUPROPION HYDROCHLORIDE 150 MG/1
150 TABLET ORAL EVERY MORNING
Qty: 90 TABLET | Refills: 3 | Status: SHIPPED | OUTPATIENT
Start: 2019-10-30 | End: 2020-08-21

## 2019-10-30 ASSESSMENT — ANXIETY QUESTIONNAIRES
6. BECOMING EASILY ANNOYED OR IRRITABLE: SEVERAL DAYS
3. WORRYING TOO MUCH ABOUT DIFFERENT THINGS: MORE THAN HALF THE DAYS
7. FEELING AFRAID AS IF SOMETHING AWFUL MIGHT HAPPEN: NOT AT ALL
GAD7 TOTAL SCORE: 7
IF YOU CHECKED OFF ANY PROBLEMS ON THIS QUESTIONNAIRE, HOW DIFFICULT HAVE THESE PROBLEMS MADE IT FOR YOU TO DO YOUR WORK, TAKE CARE OF THINGS AT HOME, OR GET ALONG WITH OTHER PEOPLE: NOT DIFFICULT AT ALL
1. FEELING NERVOUS, ANXIOUS, OR ON EDGE: NOT AT ALL
2. NOT BEING ABLE TO STOP OR CONTROL WORRYING: SEVERAL DAYS
5. BEING SO RESTLESS THAT IT IS HARD TO SIT STILL: NOT AT ALL

## 2019-10-30 ASSESSMENT — MIFFLIN-ST. JEOR: SCORE: 1608.73

## 2019-10-30 ASSESSMENT — PATIENT HEALTH QUESTIONNAIRE - PHQ9
5. POOR APPETITE OR OVEREATING: NEARLY EVERY DAY
SUM OF ALL RESPONSES TO PHQ QUESTIONS 1-9: 0

## 2019-10-30 NOTE — NURSING NOTE
Screening Questionnaire for Adult Immunization     Are you sick today?   No    Do you have allergies to medications, food or any vaccine?   No    Have you ever had a serious reaction after receiving a vaccination?   No    Do you have a long-term health problem with heart disease, lung disease,  asthma, kidney disease, diabetes, anemia, metabolic or blood disease?   No    Do you have cancer, leukemia, AIDS, or any immune system problem?   No    Do you take cortisone, prednisone, other steroids, or anticancer drugs, or  have you had any x-ray (radiation) treatments?   No    Have you had a seizure, brain, or other nervous system problem?   No    During the past year, have you received a transfusion of blood or blood       products, or been given a medicine called immune (gamma) globulin?   No    For women: Are you pregnant or is there a chance you could become         pregnant during the next month?   No    Have you received any vaccinations in the past 4 weeks?   No     Immunization questionnaire answers were all negative.      MNVFC doesn't apply on this patient    Per orders of Dr. Jones, injection of Pneumo 23 given by Ana Florentino CMA. Patient instructed to remain in clinic for 20 minutes afterwards, and to report any adverse reaction to me immediately.    Prior to injection verified patient identity using patient's name and date of birth.         Screening performed by Ana Florentino CMA

## 2019-10-30 NOTE — PROGRESS NOTES
Subjective     Neptali Moore is a 69 year old male who presents to clinic today for the following health issues:    HPI   Depression and Anxiety Follow-Up    How are you doing with your depression since your last visit? Improved     How are you doing with your anxiety since your last visit?  Improved    Are you having other symptoms that might be associated with depression or anxiety? Yes:  bad dreams    Have you had a significant life event? No     Do you have any concerns with your use of alcohol or other drugs? No    Social History     Tobacco Use     Smoking status: Never Smoker     Smokeless tobacco: Never Used   Substance Use Topics     Alcohol use: Yes     Alcohol/week: 3.0 - 6.0 standard drinks     Types: 1 - 2 Glasses of wine, 1 - 2 Cans of beer, 1 - 2 Shots of liquor per week     Frequency: 2-3 times a week     Drinks per session: 3 or 4     Binge frequency: Weekly     Comment: 2-3 drinks/week     Drug use: No     PHQ 10/1/2019 10/30/2019   PHQ-9 Total Score 13 0   Q9: Thoughts of better off dead/self-harm past 2 weeks Several days Not at all   F/U: Thoughts of suicide or self-harm No No   F/U: Safety concerns No No     ANGELICA-7 SCORE 10/1/2019 10/30/2019   Total Score 7 7     Last PHQ-9 10/30/2019   1.  Little interest or pleasure in doing things 0   2.  Feeling down, depressed, or hopeless 0   3.  Trouble falling or staying asleep, or sleeping too much 0   4.  Feeling tired or having little energy 0   5.  Poor appetite or overeating 0   6.  Feeling bad about yourself 0   7.  Trouble concentrating 0   8.  Moving slowly or restless 0   Q9: Thoughts of better off dead/self-harm past 2 weeks 0   PHQ-9 Total Score 0   Difficulty at work, home, or with people Not difficult at all   In the past two weeks have you had thoughts of suicide or self harm? No   Do you have concerns about your personal safety or the safety of others? No     ANGELICA-7  10/30/2019   1. Feeling nervous, anxious, or on edge 0   2. Not being  able to stop or control worrying 1   3. Worrying too much about different things 2   4. Trouble relaxing 3   5. Being so restless that it is hard to sit still 0   6. Becoming easily annoyed or irritable 1   7. Feeling afraid, as if something awful might happen 0   ANGELICA-7 Total Score 7   If you checked any problems, how difficult have they made it for you to do your work, take care of things at home, or get along with other people? Not difficult at all     No major side effects.   Feeling positive change.   No trouble with sleep.   No change in sleep habit.   Seeing therapist and going to wean her down as he is feeling much better.   No history of seizures.   Still biking. Stairs - fine.  Getting up from the floor is hard.   Dull achy pain with position change.   L>R. [facility] - had synvisc. Had xray over there.   Cortisone shot after that by me. It was helpful.      Social History     Occupational History     Not on file   Tobacco Use     Smoking status: Never Smoker     Smokeless tobacco: Never Used   Substance and Sexual Activity     Alcohol use: Yes     Alcohol/week: 3.0 - 6.0 standard drinks     Types: 1 - 2 Glasses of wine, 1 - 2 Cans of beer, 1 - 2 Shots of liquor per week     Frequency: 2-3 times a week     Drinks per session: 3 or 4     Binge frequency: Weekly     Comment: 2-3 drinks/week     Drug use: No     Sexual activity: Yes     Partners: Female     No Known Allergies  Patient Active Problem List   Diagnosis     PAIN JOINT, SHOULDER     Other postprocedural status(V45.89)     PAIN HIP JOINT     Current moderate episode of major depressive disorder without prior episode (H)     Reviewed medications, social history and  past medical and surgical history.    Review of system: for general, respiratory, CVS, GI and psychiatry negative except for noted above.     EXAM:  /60 (BP Location: Right arm, Patient Position: Sitting, Cuff Size: Adult Regular)   Pulse 58   Temp 97.5  F (36.4  C) (Tympanic)   " Resp 16   Ht 1.846 m (6' 0.68\")   Wt 79.5 kg (175 lb 4 oz)   SpO2 100%   BMI 23.33 kg/m    Constitutional: healthy, alert and no distress   Psychiatric: mentation appears normal and affect normal/bright       ASSESSMENT / PLAN:  (F32.1) Current moderate episode of major depressive disorder without prior episode (H)  (primary encounter diagnosis)  Comment: Significant improvement in his symptoms.  Will switch bupropion  extended release once a day.  Stick to the same dose.  Plan: buPROPion (WELLBUTRIN XL) 150 MG 24 hr tablet             (M25.561,  M25.562,  G89.29) Chronic pain of both knees  Comment: Mild arthritis as per his reports.  I do not have records from [facility].  He is having some difficulty getting up from the floor and I am suspecting some muscles weakness.  It would be reasonable to do therapy.  Is going to follow-up with Ortho if it is not improving.  Plan: [facility], AND CHIROPRACTIC REFERRAL     The above note was dictated using voice recognition. Although reviewed after completion, some word and grammatical error may remain .            "

## 2019-10-31 ASSESSMENT — ANXIETY QUESTIONNAIRES: GAD7 TOTAL SCORE: 7

## 2019-11-11 ENCOUNTER — THERAPY VISIT (OUTPATIENT)
Dept: PHYSICAL THERAPY | Facility: CLINIC | Age: 69
End: 2019-11-11
Attending: FAMILY MEDICINE
Payer: COMMERCIAL

## 2019-11-11 DIAGNOSIS — G89.29 CHRONIC PAIN OF BOTH KNEES: ICD-10-CM

## 2019-11-11 DIAGNOSIS — M25.562 CHRONIC PAIN OF BOTH KNEES: ICD-10-CM

## 2019-11-11 DIAGNOSIS — M25.561 CHRONIC PAIN OF BOTH KNEES: ICD-10-CM

## 2019-11-11 PROCEDURE — 97112 NEUROMUSCULAR REEDUCATION: CPT | Mod: GP | Performed by: PHYSICAL THERAPIST

## 2019-11-11 PROCEDURE — 97161 PT EVAL LOW COMPLEX 20 MIN: CPT | Mod: GP | Performed by: PHYSICAL THERAPIST

## 2019-11-11 ASSESSMENT — ACTIVITIES OF DAILY LIVING (ADL)
LIMPING: I DO NOT HAVE THE SYMPTOM
RAW_SCORE: 49
HOW_WOULD_YOU_RATE_THE_CURRENT_FUNCTION_OF_YOUR_KNEE_DURING_YOUR_USUAL_DAILY_ACTIVITIES_ON_A_SCALE_FROM_0_TO_100_WITH_100_BEING_YOUR_LEVEL_OF_KNEE_FUNCTION_PRIOR_TO_YOUR_INJURY_AND_0_BEING_THE_INABILITY_TO_PERFORM_ANY_OF_YOUR_USUAL_DAILY_ACTIVITIES?: 70
AS_A_RESULT_OF_YOUR_KNEE_INJURY,_HOW_WOULD_YOU_RATE_YOUR_CURRENT_LEVEL_OF_DAILY_ACTIVITY?: NEARLY NORMAL
KNEE_ACTIVITY_OF_DAILY_LIVING_SUM: 49
SQUAT: ACTIVITY IS VERY DIFFICULT
WEAKNESS: THE SYMPTOM AFFECTS MY ACTIVITY SLIGHTLY
WALK: ACTIVITY IS NOT DIFFICULT
GIVING WAY, BUCKLING OR SHIFTING OF KNEE: I HAVE THE SYMPTOM BUT IT DOES NOT AFFECT MY ACTIVITY
GO UP STAIRS: ACTIVITY IS MINIMALLY DIFFICULT
STIFFNESS: THE SYMPTOM AFFECTS MY ACTIVITY MODERATELY
GO DOWN STAIRS: ACTIVITY IS NOT DIFFICULT
HOW_WOULD_YOU_RATE_THE_OVERALL_FUNCTION_OF_YOUR_KNEE_DURING_YOUR_USUAL_DAILY_ACTIVITIES?: NEARLY NORMAL
KNEEL ON THE FRONT OF YOUR KNEE: ACTIVITY IS MINIMALLY DIFFICULT
SIT WITH YOUR KNEE BENT: ACTIVITY IS SOMEWHAT DIFFICULT
SWELLING: THE SYMPTOM AFFECTS MY ACTIVITY SLIGHTLY
RISE FROM A CHAIR: ACTIVITY IS SOMEWHAT DIFFICULT
KNEE_ACTIVITY_OF_DAILY_LIVING_SCORE: 70
PAIN: THE SYMPTOM AFFECTS MY ACTIVITY MODERATELY
STAND: ACTIVITY IS NOT DIFFICULT

## 2019-11-11 NOTE — PROGRESS NOTES
Neptali Moore is a 69 year old with a B knee complaint.  Symptoms commenced as a result of: history of B meniscetomies. Unsure why recent flare up started.   Condition occurred in the following environment: home.   Onset of symptoms: 11/1/19.   Location of symptoms: Deep in knee joints.   Pain level on number scale: 5/10.   Quality of pain: Stiff.   Associated symptoms: None.   Pain frequency (constant/intermittent): Constant.   Symptoms are exacerbated by: sitting.   Symptoms are relieved by: walking.  Progression of symptoms since onset (same/better/worse): worse.   Special tests (x-ray, MRI, CT scan, EMG, bone scan): See [facility].  General health as reported by patient is excellent.  Pertinent medical history includes: See Epic.   Medical allergies: See Epic.   Other pertinent surgeries: See Epic.   Current medications: See Epic.   Occupation: Retired Physician.   Red flags: None reported by patient.    Objective:  KNEE EVALUATION    Dynamic Movement Screen  Single leg stance observations: Good  Double limb squat observations: Good  Single limb squat observations: NA  Single-leg Bridging Observations: NA  Gait: No sig findings       Knee PROM Extension Flexion   Left WNL WNL   Right WNL WNL        Hip and Knee Strength   MMT Hip Abduction Hip Extension Hip ER Knee Flexion Knee Ext   Left 4+/5 5-/5 5/5 5-/5 5/5   Right 4+/5 5-/5 5/5 5-/5 5/5     Knee MMT Right Left  Quad Set Good Good   SLR Good Good    Knee ligaments and meniscus screen: No sig findings    Patellofemoral screen: No sig findings    PALPATION  Left: No complaints  Right: No complaints      Assessment/Plan:    Patient has the following significant findings with corresponding treatment plan.                Diagnosis 1:  B Knee pain  Pain -  manual therapy, splint/taping/bracing/orthotics, self management, education and home program  Decreased ROM/flexibility - manual therapy and therapeutic exercise  Decreased joint mobility - manual therapy and  therapeutic exercise  Decreased strength - therapeutic exercise and therapeutic activities  Impaired balance - neuro re-education and therapeutic activities  Decreased proprioception - neuro re-education and therapeutic activities    Therapy Evaluation Codes:   1) Clinical presentation characteristics are:   Stable/Uncomplicated.  2) Decision-Making    Low complexity using standardized patient assessment instrument and/or measureable assessment of functional outcome.  Cumulative Therapy Evaluation is: Low complexity.    Previous and current functional limitations:  (See Goal Flow Sheet for this information)    Short term and Long term goals: (See Goal Flow Sheet for this information)     Communication ability:  Patient appears to be able to clearly communicate and understand verbal and written communication and follow directions correctly.  Treatment Explanation - The following has been discussed with the patient:   RX ordered/plan of care  Anticipated outcomes  Possible risks and side effects  This patient would benefit from PT intervention to resume normal activities.   Rehab potential is good.    Frequency:  2 X a month, once daily  Duration:  for 3 months  Discharge Plan:  Achieve all LTG.  Independent in home treatment program.  Reach maximal therapeutic benefit.    Please refer to the daily flowsheet for treatment today, total treatment time and time spent performing 1:1 timed codes.

## 2019-11-25 ENCOUNTER — THERAPY VISIT (OUTPATIENT)
Dept: PHYSICAL THERAPY | Facility: CLINIC | Age: 69
End: 2019-11-25
Payer: COMMERCIAL

## 2019-11-25 DIAGNOSIS — M25.562 BILATERAL KNEE PAIN: Primary | ICD-10-CM

## 2019-11-25 DIAGNOSIS — M25.561 BILATERAL KNEE PAIN: Primary | ICD-10-CM

## 2019-11-25 PROCEDURE — 97112 NEUROMUSCULAR REEDUCATION: CPT | Mod: GP | Performed by: PHYSICAL THERAPIST

## 2019-11-25 PROCEDURE — 97110 THERAPEUTIC EXERCISES: CPT | Mod: GP | Performed by: PHYSICAL THERAPIST

## 2019-12-09 ENCOUNTER — THERAPY VISIT (OUTPATIENT)
Dept: PHYSICAL THERAPY | Facility: CLINIC | Age: 69
End: 2019-12-09
Payer: COMMERCIAL

## 2019-12-09 DIAGNOSIS — M25.562 BILATERAL KNEE PAIN: ICD-10-CM

## 2019-12-09 DIAGNOSIS — M25.561 BILATERAL KNEE PAIN: ICD-10-CM

## 2019-12-09 PROCEDURE — 97530 THERAPEUTIC ACTIVITIES: CPT | Mod: GP | Performed by: PHYSICAL THERAPIST

## 2019-12-09 PROCEDURE — 97112 NEUROMUSCULAR REEDUCATION: CPT | Mod: GP | Performed by: PHYSICAL THERAPIST

## 2019-12-18 ENCOUNTER — MYC MEDICAL ADVICE (OUTPATIENT)
Dept: FAMILY MEDICINE | Facility: CLINIC | Age: 69
End: 2019-12-18

## 2019-12-18 DIAGNOSIS — L98.9 SKIN LESION: Primary | ICD-10-CM

## 2019-12-18 NOTE — TELEPHONE ENCOUNTER
Dermatology referral placed per patient request    Cartavihart message sent to patient    Thank You!  Barbi Ledezma, RN  Triage Nurse

## 2019-12-18 NOTE — TELEPHONE ENCOUNTER
Team Coordinators: please fax copy of dermatology referral to [facility] per patient request.    Fax: [phone]    Thank You!  Barbi Ledezma, RN  Triage Nurse

## 2020-02-02 ASSESSMENT — ENCOUNTER SYMPTOMS
ABDOMINAL PAIN: 0
HEARTBURN: 0
NERVOUS/ANXIOUS: 0
MYALGIAS: 0
CONSTIPATION: 0
SHORTNESS OF BREATH: 0
JOINT SWELLING: 0
CHILLS: 0
DIARRHEA: 0
HEADACHES: 0
HEMATOCHEZIA: 0
DYSURIA: 0
PARESTHESIAS: 0
HEMATURIA: 0
ARTHRALGIAS: 0
NAUSEA: 0
PALPITATIONS: 0
WEAKNESS: 0
DIZZINESS: 0
EYE PAIN: 0
SORE THROAT: 0
FEVER: 0
FREQUENCY: 0
COUGH: 0

## 2020-02-02 ASSESSMENT — ANXIETY QUESTIONNAIRES
GAD7 TOTAL SCORE: 3
1. FEELING NERVOUS, ANXIOUS, OR ON EDGE: NOT AT ALL
GAD7 TOTAL SCORE: 3
4. TROUBLE RELAXING: SEVERAL DAYS
7. FEELING AFRAID AS IF SOMETHING AWFUL MIGHT HAPPEN: NOT AT ALL
5. BEING SO RESTLESS THAT IT IS HARD TO SIT STILL: NOT AT ALL
6. BECOMING EASILY ANNOYED OR IRRITABLE: NOT AT ALL
2. NOT BEING ABLE TO STOP OR CONTROL WORRYING: SEVERAL DAYS
7. FEELING AFRAID AS IF SOMETHING AWFUL MIGHT HAPPEN: NOT AT ALL
GAD7 TOTAL SCORE: 3
3. WORRYING TOO MUCH ABOUT DIFFERENT THINGS: SEVERAL DAYS

## 2020-02-02 ASSESSMENT — PATIENT HEALTH QUESTIONNAIRE - PHQ9
SUM OF ALL RESPONSES TO PHQ QUESTIONS 1-9: 1
10. IF YOU CHECKED OFF ANY PROBLEMS, HOW DIFFICULT HAVE THESE PROBLEMS MADE IT FOR YOU TO DO YOUR WORK, TAKE CARE OF THINGS AT HOME, OR GET ALONG WITH OTHER PEOPLE: SOMEWHAT DIFFICULT
SUM OF ALL RESPONSES TO PHQ QUESTIONS 1-9: 1

## 2020-02-02 ASSESSMENT — ACTIVITIES OF DAILY LIVING (ADL): CURRENT_FUNCTION: NO ASSISTANCE NEEDED

## 2020-02-03 ENCOUNTER — OFFICE VISIT (OUTPATIENT)
Dept: FAMILY MEDICINE | Facility: CLINIC | Age: 70
End: 2020-02-03
Payer: COMMERCIAL

## 2020-02-03 VITALS
RESPIRATION RATE: 20 BRPM | TEMPERATURE: 97.7 F | BODY MASS INDEX: 23.43 KG/M2 | HEART RATE: 52 BPM | HEIGHT: 72 IN | OXYGEN SATURATION: 98 % | SYSTOLIC BLOOD PRESSURE: 110 MMHG | DIASTOLIC BLOOD PRESSURE: 62 MMHG | WEIGHT: 173 LBS

## 2020-02-03 DIAGNOSIS — F32.1 CURRENT MODERATE EPISODE OF MAJOR DEPRESSIVE DISORDER WITHOUT PRIOR EPISODE (H): ICD-10-CM

## 2020-02-03 DIAGNOSIS — Z00.00 ENCOUNTER FOR MEDICARE ANNUAL WELLNESS EXAM: Primary | ICD-10-CM

## 2020-02-03 DIAGNOSIS — Z11.3 SCREENING FOR STDS (SEXUALLY TRANSMITTED DISEASES): ICD-10-CM

## 2020-02-03 PROCEDURE — 36415 COLL VENOUS BLD VENIPUNCTURE: CPT | Performed by: FAMILY MEDICINE

## 2020-02-03 PROCEDURE — 86780 TREPONEMA PALLIDUM: CPT | Performed by: FAMILY MEDICINE

## 2020-02-03 PROCEDURE — 86696 HERPES SIMPLEX TYPE 2 TEST: CPT | Performed by: FAMILY MEDICINE

## 2020-02-03 PROCEDURE — 87591 N.GONORRHOEAE DNA AMP PROB: CPT | Performed by: FAMILY MEDICINE

## 2020-02-03 PROCEDURE — 87491 CHLMYD TRACH DNA AMP PROBE: CPT | Performed by: FAMILY MEDICINE

## 2020-02-03 PROCEDURE — G0439 PPPS, SUBSEQ VISIT: HCPCS | Performed by: FAMILY MEDICINE

## 2020-02-03 PROCEDURE — 87389 HIV-1 AG W/HIV-1&-2 AB AG IA: CPT | Performed by: FAMILY MEDICINE

## 2020-02-03 PROCEDURE — 86695 HERPES SIMPLEX TYPE 1 TEST: CPT | Performed by: FAMILY MEDICINE

## 2020-02-03 ASSESSMENT — ENCOUNTER SYMPTOMS
WEAKNESS: 0
ABDOMINAL PAIN: 0
COUGH: 0
HEMATOCHEZIA: 0
MYALGIAS: 0
FREQUENCY: 0
JOINT SWELLING: 0
DIARRHEA: 0
PALPITATIONS: 0
EYE PAIN: 0
PARESTHESIAS: 0
CHILLS: 0
HEMATURIA: 0
CONSTIPATION: 0
HEADACHES: 0
DYSURIA: 0
SHORTNESS OF BREATH: 0
DIZZINESS: 0
FEVER: 0
HEARTBURN: 0
SORE THROAT: 0
ARTHRALGIAS: 0
NERVOUS/ANXIOUS: 0
NAUSEA: 0

## 2020-02-03 ASSESSMENT — ANXIETY QUESTIONNAIRES: GAD7 TOTAL SCORE: 3

## 2020-02-03 ASSESSMENT — ACTIVITIES OF DAILY LIVING (ADL): CURRENT_FUNCTION: NO ASSISTANCE NEEDED

## 2020-02-03 ASSESSMENT — PATIENT HEALTH QUESTIONNAIRE - PHQ9: SUM OF ALL RESPONSES TO PHQ QUESTIONS 1-9: 1

## 2020-02-03 ASSESSMENT — MIFFLIN-ST. JEOR: SCORE: 1587.72

## 2020-02-03 NOTE — PROGRESS NOTES
"SUBJECTIVE:   Neptali Moore is a 69 year old male who presents for Preventive Visit.    Are you in the first 12 months of your Medicare coverage?  No    Healthy Habits:     In general, how would you rate your overall health?  Good    Frequency of exercise:  2-3 days/week    Duration of exercise:  45-60 minutes    Do you usually eat at least 4 servings of fruit and vegetables a day, include whole grains    & fiber and avoid regularly eating high fat or \"junk\" foods?  Yes    Taking medications regularly:  Yes    Medication side effects:  No significant flushing    Ability to successfully perform activities of daily living:  No assistance needed    Home Safety:  Throw rugs in the hallway    Hearing Impairment:  Difficulty following a conversation in a noisy restaurant or crowded room    In the past 6 months, have you been bothered by leaking of urine?  No    In general, how would you rate your overall mental or emotional health?  Good      PHQ-2 Total Score: 0    Additional concerns today:  Yes (woud like STD check, and discuss continue weight loss and ortho report on knees)    Do you feel safe in your environment? Yes    Have you ever done Advance Care Planning? (For example, a Health Directive, POLST, or a discussion with a medical provider or your loved ones about your wishes): Yes, advance care planning is on file.    Fall risk  Fallen 2 or more times in the past year?: No  Any fall with injury in the past year?: No    Cognitive Screening   1) Repeat 3 items (Leader, Season, Table)    2) Clock draw: NORMAL  3) 3 item recall: Recalls 3 objects  Results: 3 items recalled: COGNITIVE IMPAIRMENT LESS LIKELY    Mini-CogTM Copyright ETHAN Piña. Licensed by the author for use in [facility]; reprinted with permission (sameer@.[facility]). All rights reserved.      Do you have sleep apnea, excessive snoring or daytime drowsiness?: no    Reviewed and updated as needed this visit by clinical staff  Tobacco  Allergies "  Meds  Med Hx  Surg Hx  Fam Hx  Soc Hx        Reviewed and updated as needed this visit by Provider  Tobacco        Social History     Tobacco Use     Smoking status: Never Smoker     Smokeless tobacco: Never Used   Substance Use Topics     Alcohol use: Yes     Alcohol/week: 3.0 - 6.0 standard drinks     Types: 1 - 2 Glasses of wine, 1 - 2 Cans of beer, 1 - 2 Shots of liquor per week     Frequency: 2-3 times a week     Drinks per session: 3 or 4     Binge frequency: Weekly     Comment: 2-3 drinks/week     If you drink alcohol do you typically have >3 drinks per day or >7 drinks per week? No     No flowsheet data found.           Current providers sharing in care for this patient include:    Patient Care Team:  Mark Jones MD as PCP - General (Family Practice)  Mark Jones MD as Assigned PCP  Oliva Youngblood MD as MD (Gastroenterology)    The following health maintenance items are reviewed in Epic and correct as of today:  Health Maintenance   Topic Date Due     FALL RISK ASSESSMENT  01/04/2020     MEDICARE ANNUAL WELLNESS VISIT  01/04/2020     ZOSTER IMMUNIZATION (3 of 3) 03/23/2020     PHQ-9  08/03/2020     COLONOSCOPY  11/08/2021     DTAP/TDAP/TD IMMUNIZATION (2 - Td) 06/05/2023     LIPID  01/04/2024     ADVANCE CARE PLANNING  03/05/2024     HEPATITIS C SCREENING  Completed     DEPRESSION ACTION PLAN  Completed     INFLUENZA VACCINE  Completed     PNEUMOCOCCAL IMMUNIZATION 65+ LOW/MEDIUM RISK  Completed     AORTIC ANEURYSM SCREENING (SYSTEM ASSIGNED)  Completed     IPV IMMUNIZATION  Aged Out     MENINGITIS IMMUNIZATION  Aged Out     Walking daily. Does not own car.   Does not have much appetite. Get full easier.   Losing slowly weight. Not actively trying to lose weight.     Seeing ortho. Had another knee xray - had cortisone both knees. January 2020.     Back of throat - not change. Not taking omeprazole as it did not help.     wellbutrin - dose is fine. No  concerns.  Mood is fine.    Has a new partner.  Would like to have STD screening.  Will be going to [facility] for vacation for a week.    PSA   Date Value Ref Range Status   01/04/2019 0.35 0 - 4 ug/L Final     Comment:     Assay Method:  Chemiluminescence using Siemens Vista analyzer     Review of Systems   Constitutional: Negative for chills and fever.   HENT: Positive for congestion. Negative for ear pain, hearing loss and sore throat.    Eyes: Negative for pain and visual disturbance.   Respiratory: Negative for cough and shortness of breath.    Cardiovascular: Negative for chest pain, palpitations and peripheral edema.   Gastrointestinal: Negative for abdominal pain, constipation, diarrhea, heartburn, hematochezia and nausea.   Genitourinary: Negative for discharge, dysuria, frequency, genital sores, hematuria, impotence and urgency.   Musculoskeletal: Negative for arthralgias, joint swelling and myalgias.   Skin: Negative for rash.   Neurological: Negative for dizziness, weakness, headaches and paresthesias.   Psychiatric/Behavioral: Negative for mood changes. The patient is not nervous/anxious.      OBJECTIVE:   /62 (BP Location: Right arm, Patient Position: Sitting, Cuff Size: Adult Regular)   Pulse 52   Temp 97.7  F (36.5  C) (Oral)   Resp 20   Ht 1.829 m (6')   Wt 78.5 kg (173 lb)   SpO2 98%   BMI 23.46 kg/m   Estimated body mass index is 23.46 kg/m  as calculated from the following:    Height as of this encounter: 1.829 m (6').    Weight as of this encounter: 78.5 kg (173 lb).  Physical Exam  GENERAL: healthy, alert and no distress  EYES: Eyes grossly normal to inspection, PERRL and conjunctivae and sclerae normal  HENT: ear canals and TM's normal, nose and mouth without ulcers or lesions  NECK: no adenopathy, no asymmetry, masses, or scars and thyroid normal to palpation  RESP: lungs clear to auscultation - no rales, rhonchi or wheezes  CV: regular rate and rhythm, normal S1 S2, no S3 or S4,  no murmur, click or rub, no peripheral edema and peripheral pulses strong  ABDOMEN: soft, nontender, no hepatosplenomegaly, no masses and bowel sounds normal   -both testicles descended, no hernia.  No palpable testicular lump.  MS: no gross musculoskeletal defects noted, no edema  SKIN: no suspicious lesions or rashes  NEURO: Normal strength and tone, mentation intact and speech normal  PSYCH: mentation appears normal, affect normal/bright    ASSESSMENT / PLAN:   1. Encounter for Medicare annual wellness exam  His last lipid panel was excellent.  We agreed to hold off on repeat lipid panel.  He has been slowly and gradually losing some weight.  Made some dietary changes and very physically active and it may be the primary culprit but if he continues to lose weight he may need to have further work-up.  Previous work-up was negative which is reassuring.    2. Screening for STDs (sexually transmitted diseases)     - NEISSERIA GONORRHOEA PCR  - CHLAMYDIA TRACHOMATIS PCR  - HIV Antigen Antibody Combo  - Treponema Abs w Reflex to RPR and Titer  - Herpes Simplex Virus 1 and 2 IgG    3. Current moderate episode of major depressive disorder without prior episode (H)  Comment:    Plan: Doing really well.  Continue the same dose of Wellbutrin.    COUNSELING:  Reviewed preventive health counseling, as reflected in patient instructions  Special attention given to:       Regular exercise       Healthy diet/nutrition       Vision screening       Hearing screening       Dental care       Bladder control       Fall risk prevention       Colon cancer screening       Prostate cancer screening    Estimated body mass index is 23.46 kg/m  as calculated from the following:    Height as of this encounter: 1.829 m (6').    Weight as of this encounter: 78.5 kg (173 lb).       reports that he has never smoked. He has never used smokeless tobacco.      Appropriate preventive services were discussed with this patient, including applicable  screening as appropriate for cardiovascular disease, diabetes, osteopenia/osteoporosis, and glaucoma.  As appropriate for age/gender, discussed screening for colorectal cancer, prostate cancer, breast cancer, and cervical cancer. Checklist reviewing preventive services available has been given to the patient.    Reviewed patients plan of care and provided an AVS. The Basic Care Plan (routine screening as documented in Health Maintenance) for Neptali meets the Care Plan requirement. This Care Plan has been established and reviewed with the Patient.    Counseling Resources:  ATP IV Guidelines  Pooled Cohorts Equation Calculator  Breast Cancer Risk Calculator  FRAX Risk Assessment  ICSI Preventive Guidelines  Dietary Guidelines for Americans, 2010  [employer]'s MyPlate  ASA Prophylaxis  Lung CA Screening    Mark Jones MD  [facility]    Identified Health Risks:  Answers for HPI/ROS submitted by the patient on 2/2/2020   Annual Exam:  If you checked off any problems, how difficult have these problems made it for you to do your work, take care of things at home, or get along with other people?: Somewhat difficult  PHQ9 TOTAL SCORE: 1  ANGELICA 7 TOTAL SCORE: 3

## 2020-02-03 NOTE — PATIENT INSTRUCTIONS
Patient Education   Personalized Prevention Plan  You are due for the preventive services outlined below.  Your care team is available to assist you in scheduling these services.  If you have already completed any of these items, please share that information with your care team to update in your medical record.  Health Maintenance Due   Topic Date Due     Zoster (Shingles) Vaccine (2 of 3) 02/02/2015     FALL RISK ASSESSMENT  01/04/2020     Annual Wellness Visit  01/04/2020

## 2020-02-04 ENCOUNTER — MYC MEDICAL ADVICE (OUTPATIENT)
Dept: FAMILY MEDICINE | Facility: CLINIC | Age: 70
End: 2020-02-04

## 2020-02-04 LAB
C TRACH DNA SPEC QL NAA+PROBE: NEGATIVE
HIV 1+2 AB+HIV1 P24 AG SERPL QL IA: NONREACTIVE
HSV1 IGG SERPL QL IA: >8 AI (ref 0–0.8)
HSV2 IGG SERPL QL IA: <0.2 AI (ref 0–0.8)
N GONORRHOEA DNA SPEC QL NAA+PROBE: NEGATIVE
SPECIMEN SOURCE: NORMAL
SPECIMEN SOURCE: NORMAL
T PALLIDUM AB SER QL: NONREACTIVE

## 2020-03-10 ENCOUNTER — E-VISIT (OUTPATIENT)
Dept: FAMILY MEDICINE | Facility: CLINIC | Age: 70
End: 2020-03-10
Payer: COMMERCIAL

## 2020-03-10 DIAGNOSIS — J01.90 ACUTE SINUSITIS WITH SYMPTOMS > 10 DAYS: Primary | ICD-10-CM

## 2020-03-10 PROCEDURE — 99421 OL DIG E/M SVC 5-10 MIN: CPT | Performed by: FAMILY MEDICINE

## 2020-03-11 RX ORDER — AMOXICILLIN 500 MG/1
1000 CAPSULE ORAL 3 TIMES DAILY
Qty: 12 CAPSULE | Refills: 0 | Status: SHIPPED | OUTPATIENT
Start: 2020-03-11 | End: 2021-03-03

## 2020-03-11 RX ORDER — AMOXICILLIN 500 MG/1
1000 CAPSULE ORAL 3 TIMES DAILY
Qty: 12 CAPSULE | Refills: 0 | Status: SHIPPED | OUTPATIENT
Start: 2020-03-11 | End: 2020-03-11

## 2020-03-23 PROBLEM — M25.562 BILATERAL KNEE PAIN: Status: RESOLVED | Noted: 2019-11-25 | Resolved: 2020-03-23

## 2020-03-23 PROBLEM — M25.561 BILATERAL KNEE PAIN: Status: RESOLVED | Noted: 2019-11-25 | Resolved: 2020-03-23

## 2020-03-23 NOTE — PROGRESS NOTES
Discharge Note    Progress reporting period is from initial evaluation date (please see noted date below) to Dec 9, 2019.  Linked Episodes   Type: Episode: Status: Noted: Resolved: Last update: Updated by:   PHYSICAL THERAPY B Knee Active 11/11/2019 12/9/2019  1:12 PM Marcelo Zhao, PT      Comments:       Neptali failed to follow up and current status is unknown.  Please see information below for last relevant information on current status.  Patient seen for 3 visits.    SUBJECTIVE  Subjective changes noted by patient:  Neptali reports feeling a lot better.  .  Current pain level is 3/10.     Previous pain level was  5/10.   Changes in function:  Yes (See Goal flowsheet attached for changes in current functional level)  Adverse reaction to treatment or activity: None    OBJECTIVE  Changes noted in objective findings:       ASSESSMENT/PLAN  Diagnosis: B Knee pain   Updated problem list and treatment plan:     STG/LTGs have been met or progress has been made towards goals:  Yes, please see goal flowsheet for most current information  Assessment of Progress: current status is unknown.    Last current status:     Self Management Plans:  HEP  I have re-evaluated this patient and find that the nature, scope, duration and intensity of the therapy is appropriate for the medical condition of the patient.  Neptali continues to require the following intervention to meet STG and LTG's:  HEP.    Recommendations:  Discharge with current home program.  Patient to follow up with MD as needed.    Please refer to the daily flowsheet for treatment today, total treatment time and time spent performing 1:1 timed codes.

## 2020-06-02 ENCOUNTER — MYC MEDICAL ADVICE (OUTPATIENT)
Dept: FAMILY MEDICINE | Facility: CLINIC | Age: 70
End: 2020-06-02

## 2020-06-02 DIAGNOSIS — Z20.822 EXPOSURE TO COVID-19 VIRUS: ICD-10-CM

## 2020-06-02 DIAGNOSIS — Z20.822 EXPOSURE TO COVID-19 VIRUS: Primary | ICD-10-CM

## 2020-06-02 PROCEDURE — 99000 SPECIMEN HANDLING OFFICE-LAB: CPT | Performed by: FAMILY MEDICINE

## 2020-06-02 PROCEDURE — 86769 SARS-COV-2 COVID-19 ANTIBODY: CPT | Mod: 90 | Performed by: FAMILY MEDICINE

## 2020-06-02 PROCEDURE — 36415 COLL VENOUS BLD VENIPUNCTURE: CPT | Performed by: FAMILY MEDICINE

## 2020-06-02 NOTE — LETTER
June 6, 2020        Neptali Moore  [address] [facility] [address]    COVID-19 Antibody Screen   Date Value Ref Range Status   06/02/2020 Positive  Final     Comment:     Antibodies to COVID-19 detected, which may be due to past or current   infection.         You have tested POSITIVE for COVID-19 antibodies. This means we found antibodies for the virus in your blood.     What does it mean if my test finds COVID-19 antibodies?    If we find these antibodies, it suggests:     You have had the virus.     Your body s immune system fought the virus.     We don t know if this will help protect you from getting COVID-19 again. Scientists are still learning about this.    If you have COVID-19 symptoms now, please stay home and away from others.     Your current symptoms may or may not be COVID-19.     What is antibody testing?    This is a kind of blood test. We take a small sample of your blood, and then test it for something called  antibodies.      Your body makes antibodies to fight infection. If your blood has antibodies for a certain germ, it means you ve been infected with that germ in the past.     Sometimes, antibodies stay in your body for years after you ve had the infection. They can be there even if the germ didn t make you sick. They are a sign that your body fought off the infection.    Will this test find antibodies in everyone who s had COVID-19?    No. The test finds antibodies in most people 10 days after they get sick. For some people, it takes longer than 10 days for antibodies to show up. Others may never show antibodies against COVID-19, especially if they have weak immune systems.    What are the signs of COVID-19?    Signs of COVID-19 can appear from 2 to 14 days (up to 2 weeks) after you re infected. Some people have no symptoms or only mild symptoms. Others get very sick. The most common symptoms are:      Cough    Shortness of breath or trouble breathing      Or at least 2 of these  symptoms:    Fever    Chills    Repeated shaking with chills    Muscle pain    Headache    Sore throat    Losing your sense of taste or smell    You may have other symptoms. Please contact your doctor or clinic for any symptoms that worry you.    Where can I get more information?     To learn the [facility] [place] guidelines for staying home, please visit the [place] Department of Health website at [email or web address]/coronavirus/basics.html    To learn more about COVID-19 and how to care for yourself at home, please visit the CDC website at https://www.cdc.gov/coronavirus/2019-ncov/about/steps-when-sick.html    For more options for care at [facility], please visit our website at [email or web address]    [facility] ([facility]) COVID-19 Hotline:  [phone]

## 2020-06-06 LAB
COVID-19 SPIKE RBD ABY TITER: NORMAL
COVID-19 SPIKE RBD ABY: POSITIVE

## 2020-06-06 NOTE — RESULT ENCOUNTER NOTE
Serology (COVID-19) Notification    You have tested POSITIVE for COVID-19 antibodies  Letter sent to Patient

## 2020-08-21 ENCOUNTER — OFFICE VISIT (OUTPATIENT)
Dept: FAMILY MEDICINE | Facility: CLINIC | Age: 70
End: 2020-08-21
Payer: COMMERCIAL

## 2020-08-21 VITALS
SYSTOLIC BLOOD PRESSURE: 107 MMHG | TEMPERATURE: 98.5 F | DIASTOLIC BLOOD PRESSURE: 65 MMHG | HEIGHT: 72 IN | WEIGHT: 166.4 LBS | RESPIRATION RATE: 18 BRPM | OXYGEN SATURATION: 97 % | BODY MASS INDEX: 22.54 KG/M2 | HEART RATE: 54 BPM

## 2020-08-21 DIAGNOSIS — F32.1 CURRENT MODERATE EPISODE OF MAJOR DEPRESSIVE DISORDER WITHOUT PRIOR EPISODE (H): ICD-10-CM

## 2020-08-21 PROCEDURE — 99213 OFFICE O/P EST LOW 20 MIN: CPT | Performed by: FAMILY MEDICINE

## 2020-08-21 RX ORDER — BUPROPION HYDROCHLORIDE 300 MG/1
300 TABLET ORAL EVERY MORNING
Qty: 90 TABLET | Refills: 1 | Status: SHIPPED | OUTPATIENT
Start: 2020-08-21 | End: 2021-03-03

## 2020-08-21 ASSESSMENT — MIFFLIN-ST. JEOR: SCORE: 1552.79

## 2020-08-21 NOTE — PROGRESS NOTES
Subjective     Neptali Moore is a 70 year old male who presents to clinic today for the following health issues:    HPI     Mood is not so great.   Middle of family reunion - usually trigger.   Currently on 300mg for last 2 weeks - has not made that much of difference.   Not working.   [employer] of [employer]. Involvement in neighbourhood.   Road biking - does not have a car.   Ibuprofen when knee hurts.   Got synvisc injection both knee and it is helping.       Social History     Occupational History     Not on file   Tobacco Use     Smoking status: Never Smoker     Smokeless tobacco: Never Used   Substance and Sexual Activity     Alcohol use: Yes     Alcohol/week: 3.0 - 6.0 standard drinks     Types: 1 - 2 Glasses of wine, 1 - 2 Cans of beer, 1 - 2 Shots of liquor per week     Frequency: 2-3 times a week     Drinks per session: 3 or 4     Binge frequency: Weekly     Comment: 2-3 drinks/week     Drug use: No     Sexual activity: Yes     Partners: Female     No Known Allergies  Patient Active Problem List   Diagnosis     PAIN JOINT, SHOULDER     Other postprocedural status(V45.89)     PAIN HIP JOINT     Current moderate episode of major depressive disorder without prior episode (H)     Reviewed medications, social history and  past medical and surgical history.    Review of system: for general, respiratory, CVS, GI and psychiatry negative except for noted above.     EXAM:  /65 (BP Location: Left arm, Patient Position: Sitting, Cuff Size: Adult Regular)   Pulse 54   Temp 98.5  F (36.9  C) (Tympanic)   Resp 18   Ht 1.829 m (6')   Wt 75.5 kg (166 lb 6.4 oz)   SpO2 97%   BMI 22.57 kg/m    Constitutional: healthy, alert and no distress   Psychiatric: mentation appears normal and affect normal/bright     ASSESSMENT / PLAN:  (F32.1) Current moderate episode of major depressive disorder without prior episode (H)  Comment: Going up to 300 mg of Wellbutrin is the next option and he has been using it  for last few weeks.  I recommend him to continue for another 2 to 3 weeks.  If symptom fails to improve consider going to 450 mg dose, I would recommend adding SSRI.  He is using NSAIDs on regular basis and to minimize interaction I would rather use Zoloft for him.  He does not have to schedule another visit with me and it would be okay to just request it via my chart.  We discussed about short and long-term side effect.  Plan: buPROPion (WELLBUTRIN XL) 300 MG 24 hr tablet     The above note was dictated using voice recognition. Although reviewed after completion, some word and grammatical error may remain .

## 2020-09-01 ENCOUNTER — TRANSFERRED RECORDS (OUTPATIENT)
Dept: HEALTH INFORMATION MANAGEMENT | Facility: CLINIC | Age: 70
End: 2020-09-01

## 2020-11-13 ENCOUNTER — TRANSFERRED RECORDS (OUTPATIENT)
Dept: HEALTH INFORMATION MANAGEMENT | Facility: CLINIC | Age: 70
End: 2020-11-13

## 2021-03-01 ENCOUNTER — IMMUNIZATION (OUTPATIENT)
Dept: NURSING | Facility: CLINIC | Age: 71
End: 2021-03-01
Payer: COMMERCIAL

## 2021-03-01 PROCEDURE — 0011A PR COVID VAC MODERNA 100 MCG/0.5 ML IM: CPT

## 2021-03-01 PROCEDURE — 91301 PR COVID VAC MODERNA 100 MCG/0.5 ML IM: CPT

## 2021-03-03 ENCOUNTER — OFFICE VISIT (OUTPATIENT)
Dept: FAMILY MEDICINE | Facility: CLINIC | Age: 71
End: 2021-03-03
Payer: COMMERCIAL

## 2021-03-03 VITALS
OXYGEN SATURATION: 99 % | WEIGHT: 172.4 LBS | BODY MASS INDEX: 23.35 KG/M2 | RESPIRATION RATE: 16 BRPM | TEMPERATURE: 97.7 F | HEART RATE: 54 BPM | DIASTOLIC BLOOD PRESSURE: 82 MMHG | SYSTOLIC BLOOD PRESSURE: 122 MMHG | HEIGHT: 72 IN

## 2021-03-03 DIAGNOSIS — Z01.818 PREOP GENERAL PHYSICAL EXAM: Primary | ICD-10-CM

## 2021-03-03 DIAGNOSIS — F32.1 CURRENT MODERATE EPISODE OF MAJOR DEPRESSIVE DISORDER WITHOUT PRIOR EPISODE (H): ICD-10-CM

## 2021-03-03 DIAGNOSIS — M75.111 NONTRAUMATIC INCOMPLETE TEAR OF RIGHT ROTATOR CUFF: ICD-10-CM

## 2021-03-03 LAB — HGB BLD-MCNC: 13.6 G/DL (ref 13.3–17.7)

## 2021-03-03 PROCEDURE — 85018 HEMOGLOBIN: CPT | Performed by: FAMILY MEDICINE

## 2021-03-03 PROCEDURE — 93000 ELECTROCARDIOGRAM COMPLETE: CPT | Performed by: FAMILY MEDICINE

## 2021-03-03 PROCEDURE — 36415 COLL VENOUS BLD VENIPUNCTURE: CPT | Performed by: FAMILY MEDICINE

## 2021-03-03 PROCEDURE — 99214 OFFICE O/P EST MOD 30 MIN: CPT | Performed by: FAMILY MEDICINE

## 2021-03-03 RX ORDER — GABAPENTIN 300 MG/1
300 CAPSULE ORAL EVERY EVENING
Qty: 90 CAPSULE | Refills: 1 | Status: SHIPPED | OUTPATIENT
Start: 2021-03-03 | End: 2021-08-13

## 2021-03-03 RX ORDER — GABAPENTIN 300 MG/1
CAPSULE ORAL
COMMUNITY
Start: 2020-11-05 | End: 2021-03-03

## 2021-03-03 RX ORDER — BUPROPION HYDROCHLORIDE 300 MG/1
300 TABLET ORAL EVERY MORNING
Qty: 90 TABLET | Refills: 1 | Status: SHIPPED | OUTPATIENT
Start: 2021-03-03 | End: 2021-08-13

## 2021-03-03 ASSESSMENT — ANXIETY QUESTIONNAIRES
5. BEING SO RESTLESS THAT IT IS HARD TO SIT STILL: NOT AT ALL
6. BECOMING EASILY ANNOYED OR IRRITABLE: NOT AT ALL
GAD7 TOTAL SCORE: 2
1. FEELING NERVOUS, ANXIOUS, OR ON EDGE: NOT AT ALL
2. NOT BEING ABLE TO STOP OR CONTROL WORRYING: SEVERAL DAYS
3. WORRYING TOO MUCH ABOUT DIFFERENT THINGS: SEVERAL DAYS
IF YOU CHECKED OFF ANY PROBLEMS ON THIS QUESTIONNAIRE, HOW DIFFICULT HAVE THESE PROBLEMS MADE IT FOR YOU TO DO YOUR WORK, TAKE CARE OF THINGS AT HOME, OR GET ALONG WITH OTHER PEOPLE: NOT DIFFICULT AT ALL
7. FEELING AFRAID AS IF SOMETHING AWFUL MIGHT HAPPEN: NOT AT ALL

## 2021-03-03 ASSESSMENT — PATIENT HEALTH QUESTIONNAIRE - PHQ9
5. POOR APPETITE OR OVEREATING: NOT AT ALL
SUM OF ALL RESPONSES TO PHQ QUESTIONS 1-9: 3

## 2021-03-03 ASSESSMENT — MIFFLIN-ST. JEOR: SCORE: 1575

## 2021-03-03 NOTE — PATIENT INSTRUCTIONS

## 2021-03-03 NOTE — PROGRESS NOTES
[facility] [address] [place] [address]  Phone: [phone]  Primary Provider: Junior Jones  Pre-op Performing Provider: JUNIOR JONES    PREOPERATIVE EVALUATION:  Today's date: 3/3/2021    Neptali Moore is a 71 year old male who presents for a preoperative evaluation.    Surgical Information:  Surgery/Procedure: right shoulder rotater cuff   Surgery Location: [facility]  Surgeon: Dr Villanueva  Surgery Date: 03/19/2021  Time of Surgery: 12:00pm  Where patient plans to recover: At home with family  Fax number for surgical facility: [phone]    Type of Anesthesia Anticipated: General    Assessment & Plan     The proposed surgical procedure is considered INTERMEDIATE risk.    Preop general physical exam     - EKG 12-lead complete w/read - Clinics  - Hemoglobin    Nontraumatic incomplete tear of right rotator cuff  History of subscapularis repair. Now has supraspinatus and infraspinatus tear.      Current moderate episode of major depressive disorder without prior episode (H)  Gabapentin for pain by surgeon and it is helping a lot for mood and sleep also. OK to use it. Will refill. Using only at bed time.   - gabapentin (NEURONTIN) 300 MG capsule; Take 1 capsule (300 mg) by mouth every evening  - buPROPion (WELLBUTRIN XL) 300 MG 24 hr tablet; Take 1 tablet (300 mg) by mouth every morning         Risks and Recommendations:  The patient has the following additional risks and recommendations for perioperative complications:   - No identified additional risk factors other than previously addressed    Medication Instructions:  patient to take wellbutrin only on the day of surgery. Skip rest of the meds. Skipping nsaids 1 week before surgery    RECOMMENDATION:  APPROVAL GIVEN to proceed with proposed procedure, without further diagnostic evaluation.    The patient has having persistent issues with obtaining Covid test he can contact us and I  can order it.  Right now contact surgeon and coordinate Covid test preoperatively.       Subjective     HPI related to upcoming procedure:      Right shoulder rotator cuff tear. History of tear repair in the past.     Also had some neck djd. Had neck MRI.     Was given gabapentin by ortho. It really helped him.   Was using 300mg gabapentin at night time.     Got first dose of covid vaccine.     For presurgical covid test - havent heard anything yet. Will let us know if still an issue.       Preop Questions 3/3/2021   1. Have you ever had a heart attack or stroke? No   2. Have you ever had surgery on your heart or blood vessels, such as a stent placement, a coronary artery bypass, or surgery on an artery in your head, neck, heart, or legs? No   3. Do you have chest pain with activity? No   4. Do you have a history of  heart failure? No   5. Do you currently have a cold, bronchitis or symptoms of other infection? No   6. Do you have a cough, shortness of breath, or wheezing? No   7. Do you or anyone in your family have previous history of blood clots? YES -  Father in 90s.    8. Do you or does anyone in your family have a serious bleeding problem such as prolonged bleeding following surgeries or cuts? No   9. Have you ever had problems with anemia or been told to take iron pills? No   10. Have you had any abnormal blood loss such as black, tarry or bloody stools? No   11. Have you ever had a blood transfusion? No   12. Are you willing to have a blood transfusion if it is medically needed before, during, or after your surgery? Yes   13. Have you or any of your relatives ever had problems with anesthesia? No   14. Do you have sleep apnea, excessive snoring or daytime drowsiness? No   15. Do you have any artifical heart valves or other implanted medical devices like a pacemaker, defibrillator, or continuous glucose monitor? No   16. Do you have artificial joints? No   17. Are you allergic to latex? No     Health Care  Directive:  Patient has a Health Care Directive on file    Preoperative Review of :   reviewed - tylenol#3 last year. Getting gabapentin.            Review of Systems  Constitutional, neuro, ENT, endocrine, pulmonary, cardiac, gastrointestinal, genitourinary, musculoskeletal, integument and psychiatric systems are negative, except as otherwise noted.    Patient Active Problem List    Diagnosis Date Noted     Current moderate episode of major depressive disorder without prior episode (H) 10/01/2019     Priority: Medium     PAIN HIP JOINT 09/02/2015     Priority: Medium     PAIN JOINT, SHOULDER 07/07/2015     Priority: Medium     Other postprocedural status(V45.89) 07/07/2015     Priority: Medium      Past Medical History:   Diagnosis Date     Arthritis      Past Surgical History:   Procedure Laterality Date     ESOPHAGOSCOPY, GASTROSCOPY, DUODENOSCOPY (EGD), COMBINED N/A 8/6/2019    Procedure: ESOPHAGOGASTRODUODENOSCOPY, WITH BIOPSY;  Surgeon: Oliva Youngblood MD;  Location: [facility] OR     EXTRACTION(S) DENTAL       KNEE SURGERY      bilateral; partial meniscus removal     TONSILLECTOMY       Current Outpatient Medications   Medication Sig Dispense Refill     buPROPion (WELLBUTRIN XL) 300 MG 24 hr tablet Take 1 tablet (300 mg) by mouth every morning 90 tablet 1     gabapentin (NEURONTIN) 300 MG capsule        Multiple Vitamins-Minerals (MULTIVITAMIN MEN 50+) TABS        vitamin D3 (CHOLECALCIFEROL) 2000 units tablet Take 1 tablet by mouth 2 times daily       amoxicillin (AMOXIL) 500 MG capsule Take 2 capsules (1,000 mg) by mouth 3 times daily (Patient not taking: Reported on 8/21/2020) 12 capsule 0       No Known Allergies     Social History     Tobacco Use     Smoking status: Never Smoker     Smokeless tobacco: Never Used   Substance Use Topics     Alcohol use: Yes     Alcohol/week: 3.0 - 6.0 standard drinks     Types: 1 - 2 Glasses of wine, 1 - 2 Cans of beer, 1 - 2 Shots of liquor per week      Frequency: 2-3 times a week     Drinks per session: 3 or 4     Binge frequency: Weekly     Comment: 2-3 drinks/week     Family History   Problem Relation Age of Onset     Dementia Mother      Arthritis Mother      Pacemaker Father      History   Drug Use No         Objective     There were no vitals taken for this visit.    Physical Exam    GENERAL APPEARANCE: healthy, alert and no distress     EYES: EOMI,  PERRL     NECK: no adenopathy, no asymmetry, masses, or scars and thyroid normal to palpation     RESP: lungs clear to auscultation - no rales, rhonchi or wheezes     CV: regular rates and rhythm, normal S1 S2, no S3 or S4 and no murmur, click or rub     MS: Deferred     SKIN: no suspicious lesions or rashes on visible parts     NEURO: Normal strength and tone, sensory exam grossly normal, mentation intact and speech normal     PSYCH: mentation appears normal. and affect normal/bright     LYMPHATICS: No cervical adenopathy    Recent Labs   Lab Test 10/01/19  0953 07/02/19  0953   HGB 13.4 13.7    267   NA  --  141   POTASSIUM  --  5.0   CR  --  0.98        Diagnostics:  Recent Results (from the past 24 hour(s))   Hemoglobin    Collection Time: 03/03/21  8:18 AM   Result Value Ref Range    Hemoglobin 13.6 13.3 - 17.7 g/dL      EKG: sinus bradycardia, normal axis, normal intervals, no acute ST/T changes c/w ischemia, unchanged from previous tracings    Revised Cardiac Risk Index (RCRI):  The patient has the following serious cardiovascular risks for perioperative complications:   - No serious cardiac risks = 0 points     RCRI Interpretation: 0 points: Class I (very low risk - 0.4% complication rate)              Signed Electronically by: Mark Jones MD, MD  Copy of this evaluation report is provided to requesting physician.    [facility] Guidelines    Revised Cardiac Risk Index

## 2021-03-04 ASSESSMENT — ANXIETY QUESTIONNAIRES: GAD7 TOTAL SCORE: 2

## 2021-03-17 ENCOUNTER — TELEPHONE (OUTPATIENT)
Dept: FAMILY MEDICINE | Facility: CLINIC | Age: 71
End: 2021-03-17

## 2021-03-17 DIAGNOSIS — Z20.822 ENCOUNTER FOR LABORATORY TESTING FOR COVID-19 VIRUS: ICD-10-CM

## 2021-03-17 DIAGNOSIS — Z20.822 ENCOUNTER FOR LABORATORY TESTING FOR COVID-19 VIRUS: Primary | ICD-10-CM

## 2021-03-17 LAB
LABORATORY COMMENT REPORT: NORMAL
SARS-COV-2 RNA RESP QL NAA+PROBE: NEGATIVE
SARS-COV-2 RNA RESP QL NAA+PROBE: NORMAL
SPECIMEN SOURCE: NORMAL
SPECIMEN SOURCE: NORMAL

## 2021-03-17 PROCEDURE — U0005 INFEC AGEN DETEC AMPLI PROBE: HCPCS | Performed by: FAMILY MEDICINE

## 2021-03-17 PROCEDURE — U0003 INFECTIOUS AGENT DETECTION BY NUCLEIC ACID (DNA OR RNA); SEVERE ACUTE RESPIRATORY SYNDROME CORONAVIRUS 2 (SARS-COV-2) (CORONAVIRUS DISEASE [COVID-19]), AMPLIFIED PROBE TECHNIQUE, MAKING USE OF HIGH THROUGHPUT TECHNOLOGIES AS DESCRIBED BY CMS-2020-01-R: HCPCS | Performed by: FAMILY MEDICINE

## 2021-03-17 NOTE — TELEPHONE ENCOUNTER
Writer called patient and informed him COVID-19 lab ordered by Dr. Jones.  Lab appt availability at 1300 today and patient confirmed time works for him.  Lab appt scheduled today at 1300.  Appt, date, time, location and what to expect after check-in reviewed with patient.    JEWEL Harrington, NANYN, RN  [facility]

## 2021-03-17 NOTE — TELEPHONE ENCOUNTER
Dr. Jones-Please review and sign if agree.    Call received from patient:  1. Surgery scheduled on 3/19/21 and surgeon not in [facility] system so unable to get pre-surgical COVID-19 test ordered  2. Dr. Jones said he would order this test    Order pended.    Triage to call patient back and add patient to lab schedule for today.    Thank you!  NANY CaalN, RN  [facility]

## 2021-03-27 ENCOUNTER — HEALTH MAINTENANCE LETTER (OUTPATIENT)
Age: 71
End: 2021-03-27

## 2021-03-29 ENCOUNTER — IMMUNIZATION (OUTPATIENT)
Dept: NURSING | Facility: CLINIC | Age: 71
End: 2021-03-29
Attending: INTERNAL MEDICINE
Payer: COMMERCIAL

## 2021-03-29 PROCEDURE — 0012A PR COVID VAC MODERNA 100 MCG/0.5 ML IM: CPT

## 2021-03-29 PROCEDURE — 91301 PR COVID VAC MODERNA 100 MCG/0.5 ML IM: CPT

## 2021-08-02 ENCOUNTER — MYC MEDICAL ADVICE (OUTPATIENT)
Dept: FAMILY MEDICINE | Facility: CLINIC | Age: 71
End: 2021-08-02

## 2021-08-10 ENCOUNTER — TELEPHONE (OUTPATIENT)
Dept: GASTROENTEROLOGY | Facility: CLINIC | Age: 71
End: 2021-08-10

## 2021-08-10 ENCOUNTER — TELEPHONE (OUTPATIENT)
Dept: FAMILY MEDICINE | Facility: CLINIC | Age: 71
End: 2021-08-10

## 2021-08-10 DIAGNOSIS — Z12.11 SCREEN FOR COLON CANCER: Primary | ICD-10-CM

## 2021-08-10 NOTE — TELEPHONE ENCOUNTER
Screening Questions  1. Are you active on mychart?    2. What insurance is in the chart? [employer]are    2.  Ordering/Referring Provider: Mark Jones MD    3. BMI 23.1    4. Are you on daily home oxygen? n    5. Do you have a history of difficult airway? n    6. Have you had a heart, lung, or liver transplant? n    7. Are you currently on dialysis? n    8. Have you had a stroke or Transient ischemic atttack (TIA) within 6 months? n    9. In the past 6 months, have you had any heart related issues including cardiomyopathy or heart attack?         If yes, did it require cardiac stenting or other implantable device?n    10. Do you have any implantable devices in your body (pacemaker, defib, LVAD)? n    11. Do you take nitroglycerin? If yes, how often? n    12. Are you currently taking any blood thinners?n    13. Are you a diabetic? n    14. (Females) Are you currently pregnant? n  If yes, how many weeks?    15. Have you had a procedure in the past that was difficult to tolerate with conscious sedation? Any allergies to Fentanyl or Versed n    16. Are you taking any scheduled prescription narcotics more than once daily? n    17. Do you have any chemical dependencies such as alcohol, street drugs, or methadone? n    18. Do you have any history of post-traumatic stress syndrome or mental health issues? n    19. Do you transfer independently? y    20.  Do you have any issues with constipation? n    21. Preferred Pharmacy for Pre Prescription     Scheduling Details    Colonoscopy Prep Sent?:   Procedure Scheduled: Colonoscopy  Provider/Surgeon: Binh Lewis  Date of Procedure: 9/7  Location: [facility]  Caller (Please ask for phone number if not scheduled by patient): Neptali      Sedation Type:   Conscious Sedation- Needs  for 6 hours after the procedure  MAC/General-Needs  for 24 hours after procedure    Pre-op Required at [facility], [place], [place] and OR for MAC sedation:   (if yes advise patient  they will need a pre-op prior to procedure)      Is patient on blood thinners? -n (If yes- inform patient to follow up with PCP or provider for follow up instructions)     Informed patient they will need an adult  Y  Cannot take any type of public or medical transportation alone    Informed Patient of COVID Test Requirement y    Confirmed Nurse will call to complete assessment y    Additional comments: Patient wants little to no sedation. Sent message to procedure provider

## 2021-08-10 NOTE — TELEPHONE ENCOUNTER
----- Message from [employer] sent at 8/10/2021  8:35 AM CDT -----  Regarding: Endoscopy Referral  Morning Dr. Jones,    Patient reached out to schedule a colonoscopy procedure. He states he is due for one soon. Can you send in an order and I will reach out to him to set up the procedure.        Thank you,  Marleni Donnelly  Endo Scheduling Team

## 2021-08-12 ASSESSMENT — ANXIETY QUESTIONNAIRES
2. NOT BEING ABLE TO STOP OR CONTROL WORRYING: MORE THAN HALF THE DAYS
3. WORRYING TOO MUCH ABOUT DIFFERENT THINGS: MORE THAN HALF THE DAYS
7. FEELING AFRAID AS IF SOMETHING AWFUL MIGHT HAPPEN: SEVERAL DAYS
GAD7 TOTAL SCORE: 10
GAD7 TOTAL SCORE: 10
6. BECOMING EASILY ANNOYED OR IRRITABLE: MORE THAN HALF THE DAYS
4. TROUBLE RELAXING: MORE THAN HALF THE DAYS
GAD7 TOTAL SCORE: 10
7. FEELING AFRAID AS IF SOMETHING AWFUL MIGHT HAPPEN: SEVERAL DAYS
8. IF YOU CHECKED OFF ANY PROBLEMS, HOW DIFFICULT HAVE THESE MADE IT FOR YOU TO DO YOUR WORK, TAKE CARE OF THINGS AT HOME, OR GET ALONG WITH OTHER PEOPLE?: SOMEWHAT DIFFICULT
5. BEING SO RESTLESS THAT IT IS HARD TO SIT STILL: NOT AT ALL
1. FEELING NERVOUS, ANXIOUS, OR ON EDGE: SEVERAL DAYS

## 2021-08-12 ASSESSMENT — PATIENT HEALTH QUESTIONNAIRE - PHQ9
SUM OF ALL RESPONSES TO PHQ QUESTIONS 1-9: 8
10. IF YOU CHECKED OFF ANY PROBLEMS, HOW DIFFICULT HAVE THESE PROBLEMS MADE IT FOR YOU TO DO YOUR WORK, TAKE CARE OF THINGS AT HOME, OR GET ALONG WITH OTHER PEOPLE: SOMEWHAT DIFFICULT
SUM OF ALL RESPONSES TO PHQ QUESTIONS 1-9: 8

## 2021-08-13 ENCOUNTER — E-CONSULT (OUTPATIENT)
Dept: NEUROLOGY | Facility: CLINIC | Age: 71
End: 2021-08-13
Payer: COMMERCIAL

## 2021-08-13 ENCOUNTER — VIRTUAL VISIT (OUTPATIENT)
Dept: FAMILY MEDICINE | Facility: CLINIC | Age: 71
End: 2021-08-13
Payer: COMMERCIAL

## 2021-08-13 DIAGNOSIS — R41.89 SUBJECTIVE MEMORY COMPLAINTS: Primary | ICD-10-CM

## 2021-08-13 DIAGNOSIS — F32.1 CURRENT MODERATE EPISODE OF MAJOR DEPRESSIVE DISORDER WITHOUT PRIOR EPISODE (H): ICD-10-CM

## 2021-08-13 PROCEDURE — 99214 OFFICE O/P EST MOD 30 MIN: CPT | Mod: 95 | Performed by: FAMILY MEDICINE

## 2021-08-13 PROCEDURE — 99451 NTRPROF PH1/NTRNET/EHR 5/>: CPT | Performed by: PSYCHIATRY & NEUROLOGY

## 2021-08-13 PROCEDURE — 99207 E-CONSULT TO NEUROLOGY (ADULT OUTPT PROVIDER TO SPECIALIST WRITTEN QUESTION & RESPONSE): CPT | Performed by: FAMILY MEDICINE

## 2021-08-13 RX ORDER — BUPROPION HYDROCHLORIDE 300 MG/1
300 TABLET ORAL EVERY MORNING
Qty: 90 TABLET | Refills: 1 | Status: SHIPPED | OUTPATIENT
Start: 2021-08-13 | End: 2022-03-01

## 2021-08-13 RX ORDER — CITALOPRAM HYDROBROMIDE 20 MG/1
TABLET ORAL
Qty: 90 TABLET | Refills: 1 | Status: SHIPPED | OUTPATIENT
Start: 2021-08-13 | End: 2021-10-08

## 2021-08-13 ASSESSMENT — PATIENT HEALTH QUESTIONNAIRE - PHQ9: SUM OF ALL RESPONSES TO PHQ QUESTIONS 1-9: 8

## 2021-08-13 ASSESSMENT — ANXIETY QUESTIONNAIRES: GAD7 TOTAL SCORE: 10

## 2021-08-13 NOTE — PROGRESS NOTES
Neptali is a 71 year old who is being evaluated via a billable video visit.      How would you like to obtain your AVS? MyChart  If the video visit is dropped, the invitation should be resent by: Text to cell phone: [phone]  Will anyone else be joining your video visit? No      Video Start Time: 9:05 AM    Assessment & Plan     Current moderate episode of major depressive disorder without prior episode (H)  Mild worsening of mood. Initially wellbutrin was helpful but not as much lately. Some life stressors but overall doing well.   We discussed considering CBT and he will think about it.   We discussed adding ssri agent. Side effects discussed. We agreed to add celexa 10mg for 10 days and then increase it to 20mg per day.   - buPROPion (WELLBUTRIN XL) 300 MG 24 hr tablet; Take 1 tablet (300 mg) by mouth every morning  - citalopram (CELEXA) 20 MG tablet; Take 1/2 pill for 10 days and then 1 pill per day.    Subjective memory complaints  MMSE 29/30 obtained via video visit (missed date). He is a retired physician. He is wondering about memory booster pills. I am not sure if memory pills would be helpful for boosting memory but it may help to prevent worsening. No formal diagnosis and not sure if it is appropriate. We discussed to ask neurologist and we agreed to start with neuro consult. Depression can cause pseudodementia but it is relatively stable.   - E-CONSULT TO NEUROLOGY (ADULT OUTPT PROVIDER TO SPECIALIST WRITTEN QUESTION & RESPONSE)                 No follow-ups on file.    Mark Jones MD, MD  [facility]    Subjective   Neptali is a 71 year old who presents for the following health issues     HPI     Depression Followup    How are you doing with your depression since your last visit? Worsened     Are you having other symptoms that might be associated with depression? No    Have you had a significant life event?  No     Are you feeling anxious or having panic attacks?    No    Do you have any concerns with your use of alcohol or other drugs? No    Social History     Tobacco Use     Smoking status: Never Smoker     Smokeless tobacco: Never Used   Substance Use Topics     Alcohol use: Yes     Alcohol/week: 3.0 - 6.0 standard drinks     Types: 1 - 2 Glasses of wine, 1 - 2 Cans of beer, 1 - 2 Shots of liquor per week     Comment: 2-3 drinks/week     Drug use: No     PHQ 7/28/2020 3/3/2021 8/12/2021   PHQ-9 Total Score 6 3 8   Q9: Thoughts of better off dead/self-harm past 2 weeks Not at all Not at all Not at all   F/U: Thoughts of suicide or self-harm - - -   F/U: Safety concerns - - -     ANGELICA-7 SCORE 2/2/2020 3/3/2021 8/12/2021   Total Score 3 (minimal anxiety) - 10 (moderate anxiety)   Total Score 3 2 10     Last PHQ-9 8/12/2021   1.  Little interest or pleasure in doing things 2   2.  Feeling down, depressed, or hopeless 2   3.  Trouble falling or staying asleep, or sleeping too much 0   4.  Feeling tired or having little energy 1   5.  Poor appetite or overeating 0   6.  Feeling bad about yourself 3   7.  Trouble concentrating 0   8.  Moving slowly or restless 0   Q9: Thoughts of better off dead/self-harm past 2 weeks 0   PHQ-9 Total Score 8   Difficulty at work, home, or with people -   In the past two weeks have you had thoughts of suicide or self harm? -   Do you have concerns about your personal safety or the safety of others? -     ANGELICA-7  8/12/2021   1. Feeling nervous, anxious, or on edge 1   2. Not being able to stop or control worrying 2   3. Worrying too much about different things 2   4. Trouble relaxing 2   5. Being so restless that it is hard to sit still 0   6. Becoming easily annoyed or irritable 2   7. Feeling afraid, as if something awful might happen 1   ANGELICA-7 Total Score 10   If you checked any problems, how difficult have they made it for you to do your work, take care of things at home, or get along with other people? -     Answers for HPI/ROS submitted by the  patient on 8/12/2021  If you checked off any problems, how difficult have these problems made it for you to do your work, take care of things at home, or get along with other people?: Somewhat difficult  PHQ9 TOTAL SCORE: 8  ANGELICA 7 TOTAL SCORE: 10  Depression/Anxiety: Depression  Status since last visit:: worse  Other associated symptoms of depression:: No  Significant life event: : relationship concerns  Anxious:: No  Current substance use:: No  How many servings of fruits and vegetables do you eat daily?: 4 or more  On average, how many sweetened beverages do you drink each day (Examples: soda, juice, sweet tea, etc.  Do NOT count diet or artificially sweetened beverages)?: 2  How many minutes a day do you exercise enough to make your heart beat faster?: 30 to 60  How many days a week do you exercise enough to make your heart beat faster?: 5  How many days per week do you miss taking your medication?: 0          Suicide Assessment Five-step Evaluation and Treatment (SAFE-T)      Last couple of months - darker mood thinking about what happened in the past negatively. Does not think it needs any major intervention at this point. Been to therapist in the past. wellbutrin is helping. Not sure it is helping much right now. Working on changing things.     Has a new relationship and it is going well.  Has a cat. Helping daughters remodeled house. Facilitating medical class at .     2 recent trips. 2nd trip was with family - family reunion was tough.     Not using gabapentin, vitamins on regular basis.     Blood in stool x 2. Scheduled colonoscopy.     Milder changes in memory - mostly unable to remember names and pronoun for hours and then gets it back. 1-2 yrs ago missed turning off stove but no other issue like that.            Review of Systems         Objective           Vitals:  No vitals were obtained today due to virtual visit.    Physical Exam   GENERAL: Healthy, alert and no distress  EYES: Eyes grossly normal  to inspection.  No discharge or erythema, or obvious scleral/conjunctival abnormalities.  RESP: No audible wheeze, cough, or visible cyanosis.  No visible retractions or increased work of breathing.    SKIN: Visible skin clear. No significant rash, abnormal pigmentation or lesions.  NEURO: Cranial nerves grossly intact.  Mentation and speech appropriate for age.  PSYCH: Mentation appears normal, affect normal/bright, judgement and insight intact, normal speech and appearance well-groomed.                Video-Visit Details    Type of service:  Video Visit    Video End Time:9:34 am    Originating Location (pt. Location): Home    Distant Location (provider location):  [facility]     Platform used for Video Visit: Doyenz

## 2021-08-13 NOTE — PROGRESS NOTES
"ALL SMARTFIELDS MUST BE COMPLETED FOR PATIENT CARE AND BILLING    8/13/2021     E-Consult has been accepted.    Interprofessional consultation requested by:  Mark Jones MD      Clinical Question/Purpose: MY CLINICAL QUESTION IS: Patient has mild subjective memory loss. Minimental status exam 29/30 (missed date). 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 He is wondering about trial of \"memory pills\" and I am wondering if it is worth it or indicated?    Patient assessment and information reviewed: Patient with subjective memory loss and mini mental exam within normal limits. He does not meet criteria for dementia, and prescription medication for Alzheimer's disease is not warranted.   -recent notes reviewed, recent labs    Recommendations:   - I recommend further evaluation of cognition with neuropsychological testing to evaluate for mild cognitive impairment, establish baseline, and evaluate for other treatable causes of subjective memory loss. Also consider TSH and vitamin B12 labs as part of consideration of treatable causes. Optimization of mood disorder is also recommended (as you are doing).    The recommendations provided in this E-Consult are based on the clinical data available to me at this time, and are furnished without the benefit of a comprehensive in-person or virtual patient evaluation, Any new clinical issues or changes in patient status since the filing of this E-Consult will need to be taken into account when assessing these recommendations. Please contact me if you have further questions.    My total time spent reviewing clinical information and formulating assessment was 15 minutes.    Report sent automatically to requesting provider once signed.     Charge codes - 0611055 (5+ minutes) or 70M2519 (No charge code)    Marielena Kwok MD  "

## 2021-08-16 ENCOUNTER — MYC MEDICAL ADVICE (OUTPATIENT)
Dept: FAMILY MEDICINE | Facility: CLINIC | Age: 71
End: 2021-08-16

## 2021-08-16 DIAGNOSIS — R41.89 SUBJECTIVE MEMORY COMPLAINTS: Primary | ICD-10-CM

## 2021-08-17 DIAGNOSIS — Z11.59 ENCOUNTER FOR SCREENING FOR OTHER VIRAL DISEASES: ICD-10-CM

## 2021-08-30 ENCOUNTER — TELEPHONE (OUTPATIENT)
Dept: GASTROENTEROLOGY | Facility: CLINIC | Age: 71
End: 2021-08-30

## 2021-08-30 DIAGNOSIS — Z12.11 ENCOUNTER FOR SCREENING COLONOSCOPY: Primary | ICD-10-CM

## 2021-08-30 RX ORDER — BISACODYL 5 MG
TABLET, DELAYED RELEASE (ENTERIC COATED) ORAL
Qty: 2 TABLET | Refills: 0 | Status: SHIPPED | OUTPATIENT
Start: 2021-08-30 | End: 2021-10-08

## 2021-08-30 NOTE — TELEPHONE ENCOUNTER
Writer reviewed pre-assessment questions with patient prior to upcoming colonoscopy on 9.7.2021.      Covid test scheduled: 9.4.2021    Arrival time: 1215    Facility location: [facility]    Sedation type: CS    Electronic Implantable devices? No    Blood thinners/Antiplatelet medication? No    Reviewed extended prep instructions with patient.  No fiber/iron supplements or foods that contain nuts/seeds 7 days prior to procedure.     Designated  policy reviewed with patient.     Patient verbalized understanding.  No further questions or concerns.    Yudelka Villasenor RN

## 2021-09-05 ENCOUNTER — HEALTH MAINTENANCE LETTER (OUTPATIENT)
Age: 71
End: 2021-09-05

## 2021-09-05 ENCOUNTER — LAB (OUTPATIENT)
Dept: URGENT CARE | Facility: URGENT CARE | Age: 71
End: 2021-09-05
Attending: INTERNAL MEDICINE
Payer: COMMERCIAL

## 2021-09-05 DIAGNOSIS — Z11.59 ENCOUNTER FOR SCREENING FOR OTHER VIRAL DISEASES: ICD-10-CM

## 2021-09-05 PROCEDURE — U0003 INFECTIOUS AGENT DETECTION BY NUCLEIC ACID (DNA OR RNA); SEVERE ACUTE RESPIRATORY SYNDROME CORONAVIRUS 2 (SARS-COV-2) (CORONAVIRUS DISEASE [COVID-19]), AMPLIFIED PROBE TECHNIQUE, MAKING USE OF HIGH THROUGHPUT TECHNOLOGIES AS DESCRIBED BY CMS-2020-01-R: HCPCS

## 2021-09-05 PROCEDURE — U0005 INFEC AGEN DETEC AMPLI PROBE: HCPCS

## 2021-09-06 LAB — SARS-COV-2 RNA RESP QL NAA+PROBE: NEGATIVE

## 2021-09-07 ENCOUNTER — ANESTHESIA (OUTPATIENT)
Dept: SURGERY | Facility: AMBULATORY SURGERY CENTER | Age: 71
End: 2021-09-07

## 2021-09-07 ENCOUNTER — HOSPITAL ENCOUNTER (OUTPATIENT)
Facility: AMBULATORY SURGERY CENTER | Age: 71
Discharge: HOME | End: 2021-09-07
Attending: INTERNAL MEDICINE | Admitting: INTERNAL MEDICINE
Payer: COMMERCIAL

## 2021-09-07 ENCOUNTER — ANESTHESIA EVENT (OUTPATIENT)
Dept: SURGERY | Facility: AMBULATORY SURGERY CENTER | Age: 71
End: 2021-09-07

## 2021-09-07 VITALS
RESPIRATION RATE: 12 BRPM | DIASTOLIC BLOOD PRESSURE: 81 MMHG | BODY MASS INDEX: 23.03 KG/M2 | TEMPERATURE: 97 F | HEIGHT: 72 IN | WEIGHT: 170 LBS | SYSTOLIC BLOOD PRESSURE: 134 MMHG | HEART RATE: 52 BPM | OXYGEN SATURATION: 99 %

## 2021-09-07 LAB — COLONOSCOPY: NORMAL

## 2021-09-07 PROCEDURE — 45390 COLONOSCOPY W/RESECTION: CPT | Mod: PT

## 2021-09-07 PROCEDURE — 88305 TISSUE EXAM BY PATHOLOGIST: CPT | Mod: TC | Performed by: INTERNAL MEDICINE

## 2021-09-07 PROCEDURE — 88305 TISSUE EXAM BY PATHOLOGIST: CPT | Mod: 26 | Performed by: PATHOLOGY

## 2021-09-07 RX ORDER — SIMETHICONE
LIQUID (ML) MISCELLANEOUS PRN
Status: DISCONTINUED | OUTPATIENT
Start: 2021-09-07 | End: 2021-09-07 | Stop reason: HOSPADM

## 2021-09-07 RX ORDER — LIDOCAINE 40 MG/G
CREAM TOPICAL
Status: DISCONTINUED | OUTPATIENT
Start: 2021-09-07 | End: 2021-09-09 | Stop reason: HOSPADM

## 2021-09-07 RX ORDER — SODIUM CHLORIDE, SODIUM LACTATE, POTASSIUM CHLORIDE, CALCIUM CHLORIDE 600; 310; 30; 20 MG/100ML; MG/100ML; MG/100ML; MG/100ML
500 INJECTION, SOLUTION INTRAVENOUS CONTINUOUS
Status: DISCONTINUED | OUTPATIENT
Start: 2021-09-07 | End: 2021-09-09 | Stop reason: HOSPADM

## 2021-09-07 RX ORDER — ONDANSETRON 2 MG/ML
4 INJECTION INTRAMUSCULAR; INTRAVENOUS
Status: DISCONTINUED | OUTPATIENT
Start: 2021-09-07 | End: 2021-09-09 | Stop reason: HOSPADM

## 2021-09-07 RX ADMIN — SODIUM CHLORIDE, SODIUM LACTATE, POTASSIUM CHLORIDE, CALCIUM CHLORIDE 500 ML: 600; 310; 30; 20 INJECTION, SOLUTION INTRAVENOUS at 12:03

## 2021-09-07 ASSESSMENT — MIFFLIN-ST. JEOR: SCORE: 1564.11

## 2021-09-08 LAB
PATH REPORT.COMMENTS IMP SPEC: NORMAL
PATH REPORT.COMMENTS IMP SPEC: NORMAL
PATH REPORT.FINAL DX SPEC: NORMAL
PATH REPORT.GROSS SPEC: NORMAL
PATH REPORT.MICROSCOPIC SPEC OTHER STN: NORMAL
PATH REPORT.RELEVANT HX SPEC: NORMAL
PHOTO IMAGE: NORMAL

## 2021-10-08 ENCOUNTER — VIRTUAL VISIT (OUTPATIENT)
Dept: FAMILY MEDICINE | Facility: CLINIC | Age: 71
End: 2021-10-08
Payer: COMMERCIAL

## 2021-10-08 DIAGNOSIS — F32.1 CURRENT MODERATE EPISODE OF MAJOR DEPRESSIVE DISORDER WITHOUT PRIOR EPISODE (H): ICD-10-CM

## 2021-10-08 DIAGNOSIS — F52.32 DELAYED EJACULATION: Primary | ICD-10-CM

## 2021-10-08 PROCEDURE — 99214 OFFICE O/P EST MOD 30 MIN: CPT | Mod: 95 | Performed by: FAMILY MEDICINE

## 2021-10-08 RX ORDER — CITALOPRAM HYDROBROMIDE 10 MG/1
TABLET ORAL
COMMUNITY
Start: 2021-10-08 | End: 2021-10-08

## 2021-10-08 RX ORDER — CITALOPRAM HYDROBROMIDE 10 MG/1
10 TABLET ORAL DAILY
COMMUNITY
Start: 2021-10-08 | End: 2022-04-26

## 2021-10-08 ASSESSMENT — ANXIETY QUESTIONNAIRES
3. WORRYING TOO MUCH ABOUT DIFFERENT THINGS: SEVERAL DAYS
IF YOU CHECKED OFF ANY PROBLEMS ON THIS QUESTIONNAIRE, HOW DIFFICULT HAVE THESE PROBLEMS MADE IT FOR YOU TO DO YOUR WORK, TAKE CARE OF THINGS AT HOME, OR GET ALONG WITH OTHER PEOPLE: SOMEWHAT DIFFICULT
2. NOT BEING ABLE TO STOP OR CONTROL WORRYING: NOT AT ALL
7. FEELING AFRAID AS IF SOMETHING AWFUL MIGHT HAPPEN: SEVERAL DAYS
6. BECOMING EASILY ANNOYED OR IRRITABLE: NOT AT ALL
1. FEELING NERVOUS, ANXIOUS, OR ON EDGE: NOT AT ALL
GAD7 TOTAL SCORE: 2
5. BEING SO RESTLESS THAT IT IS HARD TO SIT STILL: NOT AT ALL

## 2021-10-08 ASSESSMENT — PATIENT HEALTH QUESTIONNAIRE - PHQ9
5. POOR APPETITE OR OVEREATING: NOT AT ALL
SUM OF ALL RESPONSES TO PHQ QUESTIONS 1-9: 4

## 2021-10-08 NOTE — PROGRESS NOTES
Neptali is a 71 year old who is being evaluated via a billable video visit.      How would you like to obtain your AVS? MyChart  If the video visit is dropped, the invitation should be resent by: Text to cell phone: [phone]  Will anyone else be joining your video visit? No      Video Start Time: 7:22 AM    Assessment & Plan     Current moderate episode of major depressive disorder without prior episode (H)  Currently on Celexa dose 20mg per day.  We will cut it down to 10 mg/day.  See below.  - citalopram (CELEXA) 10 MG tablet;     (F52.32) Delayed ejaculation   Comment: Some low sexual drive but mostly delayed ejaculation and unable to ejaculate.  Discussed common side effects with most SSRIs.  We discussed we can cut it down dose or switch to a different SSRI or stop.  And consider using BuSpar.  We also discussed phosphorus to demonstrate any better can be used but not sure how much it helps for due to reeducation.  It may help with getting and obtaining an erection.  Plan: We finally agreed to start with cutting down Celexa to 10 mg as his mood is stable and see how he does.  If not improving he will reach out to me for alternate options.                       No follow-ups on file.    Mark Jones MD, MD  [facility]    Subjective   Neptali is a 71 year old who presents for the following health issues     HPI     Depression Followup    How are you doing with your depression since your last visit? Improved     Are you having other symptoms that might be associated with depression? No    Have you had a significant life event?  No     Are you feeling anxious or having panic attacks?   No    Do you have any concerns with your use of alcohol or other drugs? No    Social History     Tobacco Use     Smoking status: Never Smoker     Smokeless tobacco: Never Used   Substance Use Topics     Alcohol use: Yes     Alcohol/week: 3.0 - 6.0 standard drinks     Types: 1 - 2 Glasses of  wine, 1 - 2 Cans of beer, 1 - 2 Shots of liquor per week     Comment: 2-3 drinks/week     Drug use: No     PHQ 7/28/2020 3/3/2021 8/12/2021   PHQ-9 Total Score 6 3 8   Q9: Thoughts of better off dead/self-harm past 2 weeks Not at all Not at all Not at all   F/U: Thoughts of suicide or self-harm - - -   F/U: Safety concerns - - -     ANGELICA-7 SCORE 2/2/2020 3/3/2021 8/12/2021   Total Score 3 (minimal anxiety) - 10 (moderate anxiety)   Total Score 3 2 10         Suicide Assessment Five-step Evaluation and Treatment (SAFE-T)      How many servings of fruits and vegetables do you eat daily?  4 or more    On average, how many sweetened beverages do you drink each day (Examples: soda, juice, sweet tea, etc.  Do NOT count diet or artificially sweetened beverages)?   1    How many days per week do you exercise enough to make your heart beat faster? 5    How many minutes a day do you exercise enough to make your heart beat faster? 20 - 29  How many days per week do you miss taking your medication? 1    What makes it hard for you to take your medications?  remembering to take    depression under really good control.     Losing some nouns and pronouns for last 5 yrs but overall no change from baseline.     No side effects from wellbutrin.     Delayed ejaculation. Sexual drive is somewhat low but nothing significant. Partner is understanding.     Right shoulder - after surgery had some benefit but now pain radiating to arm. Sporadic from deltoid to radial nerve area. Doing some PT but has not improved much. Going to talk to ortho.     Review of Systems         Objective           Vitals:  No vitals were obtained today due to virtual visit.    Physical Exam   GENERAL: Healthy, alert and no distress  EYES: Eyes grossly normal to inspection.  No discharge or erythema, or obvious scleral/conjunctival abnormalities.  RESP: No audible wheeze, cough, or visible cyanosis.  No visible retractions or increased work of breathing.    SKIN:  Visible skin clear. No significant rash, abnormal pigmentation or lesions.  NEURO: Cranial nerves grossly intact.  Mentation and speech appropriate for age.  PSYCH: Mentation appears normal, affect normal/bright, judgement and insight intact, normal speech and appearance well-groomed.                Video-Visit Details    Type of service:  Video Visit    Video End Time:7:41am    Originating Location (pt. Location): Home    Distant Location (provider location):  [facility]     Platform used for Video Visit: [employer]

## 2021-10-09 ENCOUNTER — ALLIED HEALTH/NURSE VISIT (OUTPATIENT)
Dept: FAMILY MEDICINE | Facility: CLINIC | Age: 71
End: 2021-10-09
Payer: COMMERCIAL

## 2021-10-09 DIAGNOSIS — Z23 NEED FOR PROPHYLACTIC VACCINATION AND INOCULATION AGAINST INFLUENZA: Primary | ICD-10-CM

## 2021-10-09 PROCEDURE — 90662 IIV NO PRSV INCREASED AG IM: CPT

## 2021-10-09 PROCEDURE — G0008 ADMIN INFLUENZA VIRUS VAC: HCPCS

## 2021-10-09 PROCEDURE — 99207 PR NO CHARGE NURSE ONLY: CPT

## 2021-10-09 ASSESSMENT — ANXIETY QUESTIONNAIRES: GAD7 TOTAL SCORE: 2

## 2021-10-28 ENCOUNTER — TRANSFERRED RECORDS (OUTPATIENT)
Dept: HEALTH INFORMATION MANAGEMENT | Facility: CLINIC | Age: 71
End: 2021-10-28
Payer: COMMERCIAL

## 2021-10-29 ENCOUNTER — TRANSFERRED RECORDS (OUTPATIENT)
Dept: HEALTH INFORMATION MANAGEMENT | Facility: CLINIC | Age: 71
End: 2021-10-29
Payer: COMMERCIAL

## 2021-12-23 ENCOUNTER — IMMUNIZATION (OUTPATIENT)
Dept: NURSING | Facility: CLINIC | Age: 71
End: 2021-12-23
Payer: COMMERCIAL

## 2021-12-23 PROCEDURE — 91306 COVID-19,PF,MODERNA (18+ YRS BOOSTER .25ML): CPT

## 2021-12-23 PROCEDURE — 0064A COVID-19,PF,MODERNA (18+ YRS BOOSTER .25ML): CPT

## 2022-01-18 ENCOUNTER — TRANSFERRED RECORDS (OUTPATIENT)
Dept: HEALTH INFORMATION MANAGEMENT | Facility: CLINIC | Age: 72
End: 2022-01-18
Payer: COMMERCIAL

## 2022-02-28 DIAGNOSIS — F32.1 CURRENT MODERATE EPISODE OF MAJOR DEPRESSIVE DISORDER WITHOUT PRIOR EPISODE (H): ICD-10-CM

## 2022-03-01 RX ORDER — BUPROPION HYDROCHLORIDE 300 MG/1
TABLET ORAL
Qty: 90 TABLET | Refills: 0 | Status: SHIPPED | OUTPATIENT
Start: 2022-03-01 | End: 2022-06-15

## 2022-03-05 ENCOUNTER — LAB (OUTPATIENT)
Dept: URGENT CARE | Facility: URGENT CARE | Age: 72
End: 2022-03-05
Attending: FAMILY MEDICINE
Payer: COMMERCIAL

## 2022-03-05 DIAGNOSIS — Z20.822 ENCOUNTER FOR LABORATORY TESTING FOR COVID-19 VIRUS: ICD-10-CM

## 2022-03-05 PROCEDURE — U0003 INFECTIOUS AGENT DETECTION BY NUCLEIC ACID (DNA OR RNA); SEVERE ACUTE RESPIRATORY SYNDROME CORONAVIRUS 2 (SARS-COV-2) (CORONAVIRUS DISEASE [COVID-19]), AMPLIFIED PROBE TECHNIQUE, MAKING USE OF HIGH THROUGHPUT TECHNOLOGIES AS DESCRIBED BY CMS-2020-01-R: HCPCS

## 2022-03-05 PROCEDURE — U0005 INFEC AGEN DETEC AMPLI PROBE: HCPCS

## 2022-03-07 LAB — SARS-COV-2 RNA RESP QL NAA+PROBE: NEGATIVE

## 2022-03-22 ENCOUNTER — TRANSFERRED RECORDS (OUTPATIENT)
Dept: HEALTH INFORMATION MANAGEMENT | Facility: CLINIC | Age: 72
End: 2022-03-22
Payer: COMMERCIAL

## 2022-04-14 ENCOUNTER — MYC MEDICAL ADVICE (OUTPATIENT)
Dept: FAMILY MEDICINE | Facility: CLINIC | Age: 72
End: 2022-04-14
Payer: COMMERCIAL

## 2022-04-15 NOTE — TELEPHONE ENCOUNTER
Has a visit scheduled 5.18.2022 but would like to see you sooner. It is ok to us a hold spot on 4/25/2022?    thank you

## 2022-04-16 NOTE — TELEPHONE ENCOUNTER
Yes for same day hold.  Also ok to do ma only appt for covid booster if that can be done before his appt.

## 2022-04-17 ENCOUNTER — HEALTH MAINTENANCE LETTER (OUTPATIENT)
Age: 72
End: 2022-04-17

## 2022-04-18 NOTE — TELEPHONE ENCOUNTER
Writer responded via [employer].    NANY CaalN, RN  [facility]

## 2022-04-18 NOTE — TELEPHONE ENCOUNTER
Appt tentatively scheduled on 4/26/22 at 1010 with Dr. Jones.    Writer responded via [employer].  NANY CaalN, RN  [facility]

## 2022-04-18 NOTE — TELEPHONE ENCOUNTER
Writer responded via "[employer]    NANY CaalN, RN  [facility]

## 2022-04-19 ENCOUNTER — TRANSFERRED RECORDS (OUTPATIENT)
Dept: HEALTH INFORMATION MANAGEMENT | Facility: CLINIC | Age: 72
End: 2022-04-19
Payer: COMMERCIAL

## 2022-04-26 ENCOUNTER — OFFICE VISIT (OUTPATIENT)
Dept: FAMILY MEDICINE | Facility: CLINIC | Age: 72
End: 2022-04-26
Payer: COMMERCIAL

## 2022-04-26 VITALS
TEMPERATURE: 97.7 F | OXYGEN SATURATION: 99 % | SYSTOLIC BLOOD PRESSURE: 102 MMHG | BODY MASS INDEX: 23.84 KG/M2 | WEIGHT: 176 LBS | HEART RATE: 52 BPM | HEIGHT: 72 IN | RESPIRATION RATE: 16 BRPM | DIASTOLIC BLOOD PRESSURE: 82 MMHG

## 2022-04-26 DIAGNOSIS — F32.1 CURRENT MODERATE EPISODE OF MAJOR DEPRESSIVE DISORDER WITHOUT PRIOR EPISODE (H): ICD-10-CM

## 2022-04-26 DIAGNOSIS — M17.11 ARTHRITIS OF RIGHT KNEE: ICD-10-CM

## 2022-04-26 DIAGNOSIS — Z23 HIGH PRIORITY FOR 2019-NCOV VACCINE: ICD-10-CM

## 2022-04-26 DIAGNOSIS — R68.82 LOW LIBIDO: Primary | ICD-10-CM

## 2022-04-26 PROCEDURE — 20610 DRAIN/INJ JOINT/BURSA W/O US: CPT | Mod: RT | Performed by: FAMILY MEDICINE

## 2022-04-26 PROCEDURE — 0064A COVID-19,PF,MODERNA (18+ YRS BOOSTER .25ML): CPT | Performed by: FAMILY MEDICINE

## 2022-04-26 PROCEDURE — 91306 COVID-19,PF,MODERNA (18+ YRS BOOSTER .25ML): CPT | Performed by: FAMILY MEDICINE

## 2022-04-26 PROCEDURE — 96127 BRIEF EMOTIONAL/BEHAV ASSMT: CPT | Performed by: FAMILY MEDICINE

## 2022-04-26 PROCEDURE — 99214 OFFICE O/P EST MOD 30 MIN: CPT | Mod: 25 | Performed by: FAMILY MEDICINE

## 2022-04-26 RX ORDER — TRIAMCINOLONE ACETONIDE 40 MG/ML
40 INJECTION, SUSPENSION INTRA-ARTICULAR; INTRAMUSCULAR ONCE
Status: COMPLETED | OUTPATIENT
Start: 2022-04-26 | End: 2022-04-26

## 2022-04-26 RX ORDER — GABAPENTIN 600 MG/1
600 TABLET ORAL DAILY
COMMUNITY
End: 2022-12-05

## 2022-04-26 RX ORDER — SILDENAFIL CITRATE 20 MG/1
TABLET ORAL
Qty: 30 TABLET | Refills: 0 | Status: SHIPPED | OUTPATIENT
Start: 2022-04-26 | End: 2022-04-26

## 2022-04-26 RX ADMIN — TRIAMCINOLONE ACETONIDE 40 MG: 40 INJECTION, SUSPENSION INTRA-ARTICULAR; INTRAMUSCULAR at 11:44

## 2022-04-26 ASSESSMENT — ANXIETY QUESTIONNAIRES
7. FEELING AFRAID AS IF SOMETHING AWFUL MIGHT HAPPEN: NOT AT ALL
GAD7 TOTAL SCORE: 5
GAD7 TOTAL SCORE: 5
3. WORRYING TOO MUCH ABOUT DIFFERENT THINGS: SEVERAL DAYS
1. FEELING NERVOUS, ANXIOUS, OR ON EDGE: NOT AT ALL
GAD7 TOTAL SCORE: 5
4. TROUBLE RELAXING: MORE THAN HALF THE DAYS
7. FEELING AFRAID AS IF SOMETHING AWFUL MIGHT HAPPEN: NOT AT ALL
5. BEING SO RESTLESS THAT IT IS HARD TO SIT STILL: NOT AT ALL
2. NOT BEING ABLE TO STOP OR CONTROL WORRYING: NOT AT ALL
6. BECOMING EASILY ANNOYED OR IRRITABLE: MORE THAN HALF THE DAYS

## 2022-04-26 ASSESSMENT — PATIENT HEALTH QUESTIONNAIRE - PHQ9
SUM OF ALL RESPONSES TO PHQ QUESTIONS 1-9: 5
10. IF YOU CHECKED OFF ANY PROBLEMS, HOW DIFFICULT HAVE THESE PROBLEMS MADE IT FOR YOU TO DO YOUR WORK, TAKE CARE OF THINGS AT HOME, OR GET ALONG WITH OTHER PEOPLE: SOMEWHAT DIFFICULT
SUM OF ALL RESPONSES TO PHQ QUESTIONS 1-9: 5

## 2022-04-26 NOTE — PATIENT INSTRUCTIONS
1. Take it easy today. Use Ice on your knee if it is swollen or having pain.   Ok to take tylenol if you need to.   2. You will know if the next 7 days if the injection is helpful. You can have a knee injection up to once every 4 months. If it is not helping your pain, please contact your doctor for follow up.  3. Watch for any signs of infection-- fever, chills, redness around the injection site. Please contact us or the doctor on call if any of these develop.    Come back for lab only appt.

## 2022-04-26 NOTE — PROGRESS NOTES
Assessment & Plan     Low libido  Curious about low cholesterol level.  Reasonable to have testosterone level checked.  Discussed risks with testosterone replacement and controlled substance.  Discussed if initial fasting testosterone level is abnormal he needs another 1 in couple of weeks to recheck.  He understood.  We discussed trial of sildenafil type of drugs.  Discussed it would be reasonable to try if testosterone level comes back normal.  He has no major issue with getting or maintaining erection and I am not sure how much sildenafil would help.  We discussed about insurance and cost issues with sildenafil.  - Testosterone Free and Total; Future  -sildenafil 20mg tablet    Arthritis of right knee  Last injection less than 4 months ago.  Discussed ideally we recommend waiting at least 4 months between shots to avoid side effects.  He understood that he is struggling and would like to have injection despite possible risks.  Written consent signed.  - DRAIN/INJECT LARGE JOINT/BURSA  - triamcinolone (KENALOG-40) injection 40 mg    Current moderate episode of major depressive disorder without prior episode (H)  Was taking Wellbutrin inconsistently but now going to resume it consistently.  If that is not helping we will revisit.  Was on Celexa but was having side effects.  Some anxiety.  Wellbutrin may not be most ideal agent but okay to monitor for now.                   No follow-ups on file.    Mark Jones MD, MD  [facility]    Arline Gunderson is a 72 year old who presents for the following health issues     History of Present Illness       Mental Health Follow-up:  Patient presents to follow-up on Depression.Patient's depression since last visit has been:  Medium  The patient is not having other symptoms associated with depression.      Any significant life events: relationship concerns  Patient is feeling anxious or having panic attacks. (slight anxious)  Patient  has no concerns about alcohol or drug use.       Today's PHQ-9         PHQ-9 Total Score: 5  PHQ-9 Q9 Thoughts of better off dead/self-harm past 2 weeks :   (P) Not at all    How difficult have these problems made it for you to do your work, take care of things at home, or get along with other people: Somewhat difficult    Today's ANGELICA-7 Score: 5    Reason for visit:  Knee pain,right  Symptom onset:  More than a month  Symptoms include:  Difficult standing from floor,pain off and on  Symptom intensity:  Moderate  Symptom progression:  Staying the same  Had these symptoms before:  Yes  Has tried/received treatment for these symptoms:  Yes  Previous treatment was successful:  Yes  Prior treatment description:  Cortisone injection  What makes it worse:  Kneeling  What makes it better:  NSAIDs sometimes, not continuously    He eats 4 or more servings of fruits and vegetables daily.He consumes 1 sweetened beverage(s) daily.He exercises with enough effort to increase his heart rate 30 to 60 minutes per day.  He exercises with enough effort to increase his heart rate 3 or less days per week.   He is taking medications regularly.     Right knee - cortisone shot. Got it last from ortho. Left knee is fine. Had hyluronic acid injections in the past. Been to [facility].     Left shoulder is getting worse. Right shoulder rotator cuff surgery and feeling fine.   Been to spine specialist recently - spine issue - C6-7 narrowing. Using gabapentin 600mg at bedtime.     Mood is getting worse - march went to vacation. Missed more than 1/2 of his pills during his trip. Started cutting down his meds. Feeling irritated somewhat and edgy easily. Not all the time symptoms. Getting back to wellbutrin on regular basis.   havent been on celexa since last Oct. Thinking about going back on it if needed.     Sexual desire is there but libido is down. No trouble with erection. Wondering about low testosterone.       Review of Systems          Objective    /82 (BP Location: Right arm, Patient Position: Sitting, Cuff Size: Adult Regular)   Pulse 52   Temp 97.7  F (36.5  C) (Temporal)   Resp 16   Ht 1.829 m (6')   Wt 79.8 kg (176 lb)   SpO2 99%   BMI 23.87 kg/m    Body mass index is 23.87 kg/m .  Physical Exam         Discussed options for treatment of knee OA including oral medication, joint injection, synvisc and surgery. Pt would like injection today. We discussed complications with current procedure including the risks of infection of the joint, bleeding in joint, pain flare up, and not being effective.     RT knee corticosteroid injection  After risks, benefits and complications of steroid injection were discussed with the patient he elected to proceed.  Using sterile technique, the area was first prepped with alcohol swab and chlorprep..  Topical ethyl chloride was used as local.  A 22 gauge, 1 1/2 inch needle was used to inject 40 mg kenalong(1ml) and 4cc of 1% lidocaine each into the knee joint using an anterolateral joint line approach on the RT side.  Pt.  tolerated the procedure well without complications.  Post injection instructions given.                      Answers for HPI/ROS submitted by the patient on 4/26/2022  If you checked off any problems, how difficult have these problems made it for you to do your work, take care of things at home, or get along with other people?: Somewhat difficult  PHQ9 TOTAL SCORE: 5

## 2022-04-27 ASSESSMENT — ANXIETY QUESTIONNAIRES: GAD7 TOTAL SCORE: 5

## 2022-04-27 ASSESSMENT — PATIENT HEALTH QUESTIONNAIRE - PHQ9: SUM OF ALL RESPONSES TO PHQ QUESTIONS 1-9: 5

## 2022-05-06 ENCOUNTER — LAB (OUTPATIENT)
Dept: LAB | Facility: CLINIC | Age: 72
End: 2022-05-06
Payer: COMMERCIAL

## 2022-05-06 DIAGNOSIS — R68.82 LOW LIBIDO: ICD-10-CM

## 2022-05-06 LAB — SHBG SERPL-SCNC: 63 NMOL/L (ref 11–80)

## 2022-05-06 PROCEDURE — 36415 COLL VENOUS BLD VENIPUNCTURE: CPT

## 2022-05-06 PROCEDURE — 84270 ASSAY OF SEX HORMONE GLOBUL: CPT

## 2022-05-06 PROCEDURE — 84403 ASSAY OF TOTAL TESTOSTERONE: CPT

## 2022-05-07 LAB
TESTOST FREE SERPL-MCNC: 6.69 NG/DL
TESTOST SERPL-MCNC: 499 NG/DL (ref 240–950)

## 2022-06-09 ENCOUNTER — TRANSFERRED RECORDS (OUTPATIENT)
Dept: HEALTH INFORMATION MANAGEMENT | Facility: CLINIC | Age: 72
End: 2022-06-09
Payer: COMMERCIAL

## 2022-06-12 DIAGNOSIS — F32.1 CURRENT MODERATE EPISODE OF MAJOR DEPRESSIVE DISORDER WITHOUT PRIOR EPISODE (H): ICD-10-CM

## 2022-06-15 RX ORDER — BUPROPION HYDROCHLORIDE 300 MG/1
TABLET ORAL
Qty: 90 TABLET | Refills: 0 | Status: SHIPPED | OUTPATIENT
Start: 2022-06-15 | End: 2022-09-26

## 2022-06-15 NOTE — TELEPHONE ENCOUNTER
Routing refill request to provider for review/approval because:  PHQ-9 score greater than 4 per [facility] protocol  PHQ-9 score:    PHQ 4/26/2022   PHQ-9 Total Score 5   Q9: Thoughts of better off dead/self-harm past 2 weeks Not at all   F/U: Thoughts of suicide or self-harm -   F/U: Safety concerns -         NANY SandersN RN  [facility]

## 2022-09-26 ENCOUNTER — OFFICE VISIT (OUTPATIENT)
Dept: FAMILY MEDICINE | Facility: CLINIC | Age: 72
End: 2022-09-26
Payer: COMMERCIAL

## 2022-09-26 VITALS
HEART RATE: 56 BPM | RESPIRATION RATE: 14 BRPM | TEMPERATURE: 97.3 F | BODY MASS INDEX: 23.3 KG/M2 | HEIGHT: 72 IN | OXYGEN SATURATION: 99 % | SYSTOLIC BLOOD PRESSURE: 110 MMHG | DIASTOLIC BLOOD PRESSURE: 60 MMHG | WEIGHT: 172 LBS

## 2022-09-26 DIAGNOSIS — Z00.00 ENCOUNTER FOR MEDICARE ANNUAL WELLNESS EXAM: Primary | ICD-10-CM

## 2022-09-26 DIAGNOSIS — F32.1 CURRENT MODERATE EPISODE OF MAJOR DEPRESSIVE DISORDER WITHOUT PRIOR EPISODE (H): ICD-10-CM

## 2022-09-26 DIAGNOSIS — Z13.220 SCREENING FOR HYPERLIPIDEMIA: ICD-10-CM

## 2022-09-26 DIAGNOSIS — Z23 NEED FOR VACCINATION: ICD-10-CM

## 2022-09-26 DIAGNOSIS — Z13.1 SCREENING FOR DIABETES MELLITUS: ICD-10-CM

## 2022-09-26 DIAGNOSIS — Z12.5 SCREENING PSA (PROSTATE SPECIFIC ANTIGEN): ICD-10-CM

## 2022-09-26 LAB
ERYTHROCYTE [DISTWIDTH] IN BLOOD BY AUTOMATED COUNT: 13 % (ref 10–15)
HCT VFR BLD AUTO: 40 % (ref 40–53)
HGB BLD-MCNC: 13.3 G/DL (ref 13.3–17.7)
MCH RBC QN AUTO: 31.5 PG (ref 26.5–33)
MCHC RBC AUTO-ENTMCNC: 33.3 G/DL (ref 31.5–36.5)
MCV RBC AUTO: 95 FL (ref 78–100)
PLATELET # BLD AUTO: 290 10E3/UL (ref 150–450)
RBC # BLD AUTO: 4.22 10E6/UL (ref 4.4–5.9)
VIT B12 SERPL-MCNC: 158 PG/ML (ref 232–1245)
WBC # BLD AUTO: 5 10E3/UL (ref 4–11)

## 2022-09-26 PROCEDURE — 80053 COMPREHEN METABOLIC PANEL: CPT | Performed by: FAMILY MEDICINE

## 2022-09-26 PROCEDURE — 84443 ASSAY THYROID STIM HORMONE: CPT | Performed by: FAMILY MEDICINE

## 2022-09-26 PROCEDURE — 85027 COMPLETE CBC AUTOMATED: CPT | Performed by: FAMILY MEDICINE

## 2022-09-26 PROCEDURE — 0124A COVID-19,PF,PFIZER BOOSTER BIVALENT: CPT | Performed by: FAMILY MEDICINE

## 2022-09-26 PROCEDURE — G0008 ADMIN INFLUENZA VIRUS VAC: HCPCS | Performed by: FAMILY MEDICINE

## 2022-09-26 PROCEDURE — 91312 COVID-19,PF,PFIZER BOOSTER BIVALENT: CPT | Performed by: FAMILY MEDICINE

## 2022-09-26 PROCEDURE — 99214 OFFICE O/P EST MOD 30 MIN: CPT | Mod: 25 | Performed by: FAMILY MEDICINE

## 2022-09-26 PROCEDURE — G0439 PPPS, SUBSEQ VISIT: HCPCS | Performed by: FAMILY MEDICINE

## 2022-09-26 PROCEDURE — 80061 LIPID PANEL: CPT | Performed by: FAMILY MEDICINE

## 2022-09-26 PROCEDURE — G0103 PSA SCREENING: HCPCS | Performed by: FAMILY MEDICINE

## 2022-09-26 PROCEDURE — 90662 IIV NO PRSV INCREASED AG IM: CPT | Performed by: FAMILY MEDICINE

## 2022-09-26 PROCEDURE — 36415 COLL VENOUS BLD VENIPUNCTURE: CPT | Performed by: FAMILY MEDICINE

## 2022-09-26 PROCEDURE — 82607 VITAMIN B-12: CPT | Performed by: FAMILY MEDICINE

## 2022-09-26 RX ORDER — BUPROPION HYDROCHLORIDE 150 MG/1
150 TABLET ORAL EVERY MORNING
Qty: 90 TABLET | Refills: 3 | Status: SHIPPED | OUTPATIENT
Start: 2022-09-26 | End: 2023-09-27

## 2022-09-26 RX ORDER — BUPROPION HYDROCHLORIDE 300 MG/1
300 TABLET ORAL EVERY MORNING
Qty: 90 TABLET | Refills: 3 | Status: SHIPPED | OUTPATIENT
Start: 2022-09-26 | End: 2023-09-27

## 2022-09-26 ASSESSMENT — ACTIVITIES OF DAILY LIVING (ADL): CURRENT_FUNCTION: NO ASSISTANCE NEEDED

## 2022-09-26 ASSESSMENT — PATIENT HEALTH QUESTIONNAIRE - PHQ9: SUM OF ALL RESPONSES TO PHQ QUESTIONS 1-9: 2

## 2022-09-26 NOTE — PATIENT INSTRUCTIONS
Patient Education   Personalized Prevention Plan  You are due for the preventive services outlined below.  Your care team is available to assist you in scheduling these services.  If you have already completed any of these items, please share that information with your care team to update in your medical record.  Health Maintenance Due   Topic Date Due     COVID-19 Vaccine (5 - Booster for Moderna series) 06/21/2022     Flu Vaccine (1) 09/01/2022     Depression Assessment  10/26/2022

## 2022-09-26 NOTE — PROGRESS NOTES
"SUBJECTIVE:   Neptali is a 72 year old who presents for Preventive Visit.    Patient has been advised of split billing requirements and indicates understanding: Yes  Are you in the first 12 months of your Medicare coverage?  No    Healthy Habits:    In general, how would you rate your overall health?  Very good    Frequency of exercise:  2-3 days/week    Duration of exercise:  45-60 minutes    Do you usually eat at least 4 servings of fruit and vegetables a day, include whole grains    & fiber and avoid regularly eating high fat or \"junk\" foods?  Yes    Taking medications regularly:  0    Barriers to taking medications:  None    Medication side effects:  None    Ability to successfully perform activities of daily living:  No assistance needed    Home Safety:  No safety concerns identified    Hearing Impairment:  Difficulty following a conversation in a noisy restaurant or crowded room, feel that people are mumbling or not speaking clearly, difficulty following dialogue in the theater, need to ask people to speak up or repeat themselves and difficulty understanding soft or whispered speech    In the past 6 months, have you been bothered by leaking of urine? Yes    In general, how would you rate your overall mental or emotional health?  Good      PHQ-2 Total Score:    Additional concerns today:  Yes ( general follow up and arm follow up questions)  History of Present Illness       Reason for visit:  Need CoVid immunization in addition to prior noted issues.  I expect a regular visit, not the Medicare physical that includes no physical exam.    He eats 4 or more servings of fruits and vegetables daily.He consumes 1 sweetened beverage(s) daily.He exercises with enough effort to increase his heart rate 20 to 29 minutes per day.  He exercises with enough effort to increase his heart rate 4 days per week.   He is taking medications regularly.  He is not taking prescribed medications regularly due to None.    Do you feel safe " in your environment? Yes    Have you ever done Advance Care Planning? (For example, a Health Directive, POLST, or a discussion with a medical provider or your loved ones about your wishes): Yes, advance care planning is on file.       Fall risk  Fallen 2 or more times in the past year?: No  Any fall with injury in the past year?: No    Cognitive Screening   1) Repeat 3 items (Leader, Season, Table)    2) Clock draw: NORMAL  3) 3 item recall: Recalls 3 objects  Results: 3 items recalled: COGNITIVE IMPAIRMENT LESS LIKELY    Mini-CogTM Copyright ETHAN Piña. Licensed by the author for use in [facility]; reprinted with permission (sameer@[facility]). All rights reserved.      Do you have sleep apnea, excessive snoring or daytime drowsiness?: yes    Reviewed and updated as needed this visit by clinical staff   Tobacco  Allergies  Meds  Problems  Med Hx  Surg Hx  Fam Hx  Soc   Hx        Reviewed and updated as needed this visit by Provider    Allergies  Meds  Problems              Social History     Tobacco Use     Smoking status: Never Smoker     Smokeless tobacco: Never Used   Substance Use Topics     Alcohol use: Yes     Alcohol/week: 3.0 - 6.0 standard drinks     Types: 1 - 2 Glasses of wine, 1 - 2 Cans of beer, 1 - 2 Shots of liquor per week     Comment: 2-3 drinks/week     If you drink alcohol do you typically have >3 drinks per day or >7 drinks per week? No    Alcohol Use 9/26/2022   Prescreen: >3 drinks/day or >7 drinks/week? -   Prescreen: >3 drinks/day or >7 drinks/week? No               Current providers sharing in care for this patient include:   Patient Care Team:  Mark Jones MD as PCP - General (Family Practice)  Oliva Youngblood MD as MD (Gastroenterology)  Mark Jones MD as Assigned PCP    The following health maintenance items are reviewed in Epic and correct as of today:  Health Maintenance   Topic Date Due     COVID-19 Vaccine (5 - Booster for  Moderna series) 06/21/2022     INFLUENZA VACCINE (1) 09/01/2022     PHQ-9  03/26/2023     ANNUAL REVIEW OF HM ORDERS  04/26/2023     DTAP/TDAP/TD IMMUNIZATION (2 - Td or Tdap) 06/05/2023     MEDICARE ANNUAL WELLNESS VISIT  09/26/2023     FALL RISK ASSESSMENT  09/26/2023     LIPID  01/04/2024     COLORECTAL CANCER SCREENING  09/07/2024     ADVANCE CARE PLANNING  09/26/2027     HEPATITIS C SCREENING  Completed     DEPRESSION ACTION PLAN  Completed     Pneumococcal Vaccine: 65+ Years  Completed     ZOSTER IMMUNIZATION  Completed     AORTIC ANEURYSM SCREENING (SYSTEM ASSIGNED)  Completed     IPV IMMUNIZATION  Aged Out     MENINGITIS IMMUNIZATION  Aged Out     HEPATITIS B IMMUNIZATION  Aged Out     Depression - not bad mostly. Has some anxiety too.   Trouble with arm. Waking up with pain in arm routinely.   Did celexa in the past. Sexual side effects.     Left biceps, deltoid area. Mostly on left side. 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     Sometime on both right and left side. [place] [facility].     Got steroid shot in march in neck and it was not helpful. Gabapentin 600mg at night time. During day time doing OK.     PSA   Date Value Ref Range Status   01/04/2019 0.35 0 - 4 ug/L Final     Comment:     Assay Method:  Chemiluminescence using Siemens Vista analyzer     Review of Systems       OBJECTIVE:   /60 (BP Location: Left arm, Patient Position: Chair, Cuff Size: Adult Regular)   Pulse 96   Temp 97.3  F (36.3  C) (Temporal)   Resp 14   Ht 1.829 m (6')   Wt 78 kg (172 lb)   SpO2 99%   BMI 23.33 kg/m   Estimated body mass index is 23.33 kg/m  as calculated from the following:    Height as of this encounter: 1.829 m (6').    Weight as of this encounter: 78 kg (172 lb).  Physical Exam  GENERAL: healthy, alert and no distress  EYES: Eyes grossly normal to inspection, PERRL and conjunctivae and sclerae normal  HENT: ear canals and TM's normal, nose and mouth without ulcers or lesions  NECK: no adenopathy, no asymmetry, masses, or  scars and thyroid normal to palpation  RESP: lungs clear to auscultation - no rales, rhonchi or wheezes  CV: regular rate and rhythm, normal S1 S2, no S3 or S4, no murmur, click or rub, no peripheral edema and peripheral pulses strong  ABDOMEN: soft, nontender, no hepatosplenomegaly, no masses and bowel sounds normal  MS: no gross musculoskeletal defects noted, no edema  SKIN: no suspicious lesions or rashes  NEURO: Normal strength and tone, mentation intact and speech normal  PSYCH: mentation appears normal, affect normal/bright    Diagnostic Test Results:  Labs reviewed in Epic    ASSESSMENT / PLAN:   (Z00.00) Encounter for Medicare annual wellness exam  (primary encounter diagnosis)  Comment:    Plan:  Left shoulder pain - [facility]     (F32.1) Current moderate episode of major depressive disorder without prior episode (H)  Comment:  Mild worsening of depression and some anxiety. Did not do well with celexa. Offered other ssri to help with anxiety or buspar. Some task completion related anxiety and tolerates wellbutrin well. We discussed can try higher dose and see how he does. He agreed. Will be on max dose now.   Plan: buPROPion (WELLBUTRIN XL) 300 MG 24 hr tablet,         buPROPion (WELLBUTRIN XL) 150 MG 24 hr tablet,         CBC with platelets, Vitamin B12, Comprehensive         metabolic panel (BMP + Alb, Alk Phos, ALT, AST,        Total. Bili, TP), TSH with free T4 reflex             (Z13.220) Screening for hyperlipidemia  Comment:    Plan: Lipid panel reflex to direct LDL Fasting             (Z12.5) Screening PSA (prostate specific antigen)  Comment:    Plan: PSA, screen             (Z13.1) Screening for diabetes mellitus  Comment:    Plan: Comprehensive metabolic panel (BMP + Alb, Alk         Phos, ALT, AST, Total. Bili, TP)           (Z23) Need for vaccination  Comment:    Plan: COVID-19,PF,PFIZER BOOSTER BIVALENT (12+YRS),         INFLUENZA, QUAD, HIGH DOSE, PF, 65YR + (FLUZONE         HD)    COUNSELING:  Reviewed preventive health counseling, as reflected in patient instructions  Special attention given to:       Regular exercise       Healthy diet/nutrition       Vision screening       Hearing screening       Dental care       Bladder control       Fall risk prevention       Colon cancer screening       Prostate cancer screening    Estimated body mass index is 23.33 kg/m  as calculated from the following:    Height as of this encounter: 1.829 m (6').    Weight as of this encounter: 78 kg (172 lb).        He reports that he has never smoked. He has never used smokeless tobacco.      Appropriate preventive services were discussed with this patient, including applicable screening as appropriate for cardiovascular disease, diabetes, osteopenia/osteoporosis, and glaucoma.  As appropriate for age/gender, discussed screening for colorectal cancer, prostate cancer, breast cancer, and cervical cancer. Checklist reviewing preventive services available has been given to the patient.    Reviewed patients plan of care and provided an AVS. The Basic Care Plan (routine screening as documented in Health Maintenance) for Neptali meets the Care Plan requirement. This Care Plan has been established and reviewed with the Patient.    Counseling Resources:  ATP IV Guidelines  Pooled Cohorts Equation Calculator  Breast Cancer Risk Calculator  Breast Cancer: Medication to Reduce Risk  FRAX Risk Assessment  ICSI Preventive Guidelines  Dietary Guidelines for Americans, 2010  [employer]'s MyPlate  ASA Prophylaxis  Lung CA Screening    Mark Jones MD  [facility]    Identified Health Risks:

## 2022-09-28 PROBLEM — E53.8 VITAMIN B12 DEFICIENCY (NON ANEMIC): Status: ACTIVE | Noted: 2022-09-28

## 2022-09-28 LAB
ALBUMIN SERPL-MCNC: 3.6 G/DL (ref 3.4–5)
ALP SERPL-CCNC: 60 U/L (ref 40–150)
ALT SERPL W P-5'-P-CCNC: 19 U/L (ref 0–70)
ANION GAP SERPL CALCULATED.3IONS-SCNC: 8 MMOL/L (ref 3–14)
AST SERPL W P-5'-P-CCNC: 13 U/L (ref 0–45)
BILIRUB SERPL-MCNC: 0.8 MG/DL (ref 0.2–1.3)
BUN SERPL-MCNC: 17 MG/DL (ref 7–30)
CALCIUM SERPL-MCNC: 8.7 MG/DL (ref 8.5–10.1)
CHLORIDE BLD-SCNC: 108 MMOL/L (ref 94–109)
CHOLEST SERPL-MCNC: 157 MG/DL
CO2 SERPL-SCNC: 26 MMOL/L (ref 20–32)
CREAT SERPL-MCNC: 1.03 MG/DL (ref 0.66–1.25)
FASTING STATUS PATIENT QL REPORTED: YES
GFR SERPL CREATININE-BSD FRML MDRD: 77 ML/MIN/1.73M2
GLUCOSE BLD-MCNC: 100 MG/DL (ref 70–99)
HDLC SERPL-MCNC: 60 MG/DL
LDLC SERPL CALC-MCNC: 85 MG/DL
NONHDLC SERPL-MCNC: 97 MG/DL
POTASSIUM BLD-SCNC: 4.6 MMOL/L (ref 3.4–5.3)
PROT SERPL-MCNC: 6.3 G/DL (ref 6.8–8.8)
PSA SERPL-MCNC: 0.29 UG/L (ref 0–4)
SODIUM SERPL-SCNC: 142 MMOL/L (ref 133–144)
TRIGL SERPL-MCNC: 62 MG/DL
TSH SERPL DL<=0.005 MIU/L-ACNC: 2.57 MU/L (ref 0.4–4)

## 2022-12-05 ENCOUNTER — VIRTUAL VISIT (OUTPATIENT)
Dept: FAMILY MEDICINE | Facility: CLINIC | Age: 72
End: 2022-12-05
Payer: COMMERCIAL

## 2022-12-05 DIAGNOSIS — F32.1 CURRENT MODERATE EPISODE OF MAJOR DEPRESSIVE DISORDER WITHOUT PRIOR EPISODE (H): Primary | ICD-10-CM

## 2022-12-05 DIAGNOSIS — G47.9 SLEEP DISORDER: ICD-10-CM

## 2022-12-05 PROCEDURE — 99214 OFFICE O/P EST MOD 30 MIN: CPT | Mod: 95 | Performed by: FAMILY MEDICINE

## 2022-12-05 RX ORDER — GABAPENTIN 300 MG/1
600 CAPSULE ORAL 3 TIMES DAILY
COMMUNITY
Start: 2022-12-05 | End: 2023-09-27

## 2022-12-05 NOTE — PROGRESS NOTES
Neptali is a 72 year old who is being evaluated via a billable video visit.      How would you like to obtain your AVS? MyChart  If the video visit is dropped, the invitation should be resent by: Text to cell phone: [phone]  Will anyone else be joining your video visit? No          Assessment & Plan     Current moderate episode of major depressive disorder without prior episode (H)  He is seeing significant improvement and he is more with Wellbutrin dose change.  He understand he is on the max dose of Wellbutrin.  We will stick to the same Rx.  We discussed CBT-I for insomnia and he agreed.  - Adult Mental Health  Referral; Future    Sleep disorder  See above. Doing well with gabapentin. Stick to same rx for now.   - Adult Mental Health  Referral; Future                 No follow-ups on file.    Mark Jones MD, MD  [facility]    Arline Gunderson is a 72 year old, presenting for the following health issues:  Recheck Medication (Med Check)      History of Present Illness       Reason for visit:  Med Check    He eats 4 or more servings of fruits and vegetables daily.He consumes 1 sweetened beverage(s) daily.He exercises with enough effort to increase his heart rate 20 to 29 minutes per day.  He exercises with enough effort to increase his heart rate 3 or less days per week.   He is taking medications regularly.       wellbutrin 450mg per day.     Using 600mg gabapentin at night time. If not taking - restless sleep. Most nights needing 600. Sometime taking 300mg.   Arm is better than before. Sleep is annoying.     Thoughts do not go to as negative aspect as used to with higher dose.     Was seeing therapist but not recently.     Some snoring when sleeping on back.     Review of Systems         Objective    Vitals - Patient Reported  Weight (Patient Reported): 77.1 kg (170 lb)  Height (Patient Reported): 182.9 cm (6')  BMI (Based on Pt Reported Ht/Wt):  23.06    Physical Exam   GENERAL: Healthy, alert and no distress  EYES: Eyes grossly normal to inspection.  No discharge or erythema, or obvious scleral/conjunctival abnormalities.  RESP: No audible wheeze, cough, or visible cyanosis.  No visible retractions or increased work of breathing.    SKIN: Visible skin clear. No significant rash, abnormal pigmentation or lesions.  NEURO: Cranial nerves grossly intact.  Mentation and speech appropriate for age.  PSYCH: Mentation appears normal, affect normal/bright, judgement and insight intact, normal speech and appearance well-groomed.      Video-Visit Details    Video Start Time: 4:49 PM    Type of service:  Video Visit    Video End Time:5:02 PM    Originating Location (pt. Location): Home    Distant Location (provider location):  On-site    Platform used for Video Visit: Pixelated

## 2022-12-05 NOTE — PROGRESS NOTES
SUBJECTIVE:   Neptali is a 72 year old who presents for Preventive Visit.  Patient has been advised of split billing requirements and indicates understanding: Yes  Are you in the first 12 months of your Medicare coverage?  No    History of Present Illness       Reason for visit:  Med Check    He eats 4 or more servings of fruits and vegetables daily.He consumes 1 sweetened beverage(s) daily.He exercises with enough effort to increase his heart rate 20 to 29 minutes per day.  He exercises with enough effort to increase his heart rate 3 or less days per week.   He is taking medications regularly.      Have you ever done Advance Care Planning? (For example, a Health Directive, POLST, or a discussion with a medical provider or your loved ones about your wishes): Yes, advance care planning is on file.      Fall risk  Fallen 2 or more times in the past year?: No  Any fall with injury in the past year?: No    Cognitive Screening { :913838}    {Do you have sleep apnea, excessive snoring or daytime drowsiness? (Optional):135746}    Reviewed and updated as needed this visit by clinical staff   Tobacco  Allergies  Meds              Reviewed and updated as needed this visit by Provider                 Social History     Tobacco Use     Smoking status: Never     Smokeless tobacco: Never   Substance Use Topics     Alcohol use: Yes     Alcohol/week: 3.0 - 6.0 standard drinks     Types: 1 - 2 Glasses of wine, 1 - 2 Cans of beer, 1 - 2 Shots of liquor per week     Comment: 2-3 drinks/week     If you drink alcohol do you typically have >3 drinks per day or >7 drinks per week? No    Alcohol Use 12/5/2022   Prescreen: >3 drinks/day or >7 drinks/week? -   Prescreen: >3 drinks/day or >7 drinks/week? No       {Outside tests to abstract? :334603}    {additional problems to add (Optional):703507}    Current providers sharing in care for this patient include: {Rooming staff:  Please update Care Team from storyboard, refresh this note and  "then delete this statement}  Patient Care Team:  Mark Jones MD as PCP - General (Family Practice)  Oliva Youngblood MD as MD (Gastroenterology)  Mark Jones MD as Assigned PCP    The following health maintenance items are reviewed in Epic and correct as of today:  Health Maintenance   Topic Date Due     PHQ-9  2023     ANNUAL REVIEW OF HM ORDERS  2023     DTAP/TDAP/TD IMMUNIZATION (2 - Td or Tdap) 2023     MEDICARE ANNUAL WELLNESS VISIT  2023     FALL RISK ASSESSMENT  2023     COLORECTAL CANCER SCREENING  2024     LIPID  2027     ADVANCE CARE PLANNING  2027     HEPATITIS C SCREENING  Completed     DEPRESSION ACTION PLAN  Completed     INFLUENZA VACCINE  Completed     Pneumococcal Vaccine: 65+ Years  Completed     ZOSTER IMMUNIZATION  Completed     AORTIC ANEURYSM SCREENING (SYSTEM ASSIGNED)  Completed     COVID-19 Vaccine  Completed     IPV IMMUNIZATION  Aged Out     MENINGITIS IMMUNIZATION  Aged Out         Review of Systems  {ROS COMP (Optional):486833}    OBJECTIVE:   There were no vitals taken for this visit. Estimated body mass index is 23.33 kg/m  as calculated from the following:    Height as of 22: 1.829 m (6').    Weight as of 22: 78 kg (172 lb).  Physical Exam  {Exam (Optional) :794029}    {Diagnostic Test Results (Optional):096519::\"Diagnostic Test Results:\",\"Labs reviewed in Epic\"}    ASSESSMENT / PLAN:   {Diag Picklist:398371}    {Patient advised of split billing (Optional):939067}      COUNSELING:  {Medicare Counselin}        He reports that he has never smoked. He has never used smokeless tobacco.      Appropriate preventive services were discussed with this patient, including applicable screening as appropriate for cardiovascular disease, diabetes, osteopenia/osteoporosis, and glaucoma.  As appropriate for age/gender, discussed screening for colorectal cancer, prostate cancer, breast cancer, " and cervical cancer. Checklist reviewing preventive services available has been given to the patient.    Reviewed patients plan of care and provided an AVS. The {CarePlan:659985} for Neptali meets the Care Plan requirement. This Care Plan has been established and reviewed with the {PATIENT, FAMILY MEMBER, CAREGIVER:399073}.    {Counseling Resources  [place] Preventive Services Task Force  Cholesterol Screening  Health diet/nutrition  Pooled Cohorts Equation Calculator  [employer]'s MyPlate  ASA Prophylaxis  Lung CA Screening  Osteoporosis prevention/bone health :701656}  {Prostate Cancer Screening  Consider for men 55-69 per guidance from USPSTF :881627}    Mark Jones MD,  [facility]    Identified Health Risks:

## 2022-12-25 ENCOUNTER — MYC MEDICAL ADVICE (OUTPATIENT)
Dept: FAMILY MEDICINE | Facility: CLINIC | Age: 72
End: 2022-12-25

## 2023-01-25 ENCOUNTER — VIRTUAL VISIT (OUTPATIENT)
Dept: PSYCHOLOGY | Facility: CLINIC | Age: 73
End: 2023-01-25
Attending: FAMILY MEDICINE
Payer: COMMERCIAL

## 2023-01-25 DIAGNOSIS — F33.1 MAJOR DEPRESSIVE DISORDER, RECURRENT EPISODE, MODERATE (H): Primary | ICD-10-CM

## 2023-01-25 PROCEDURE — 90791 PSYCH DIAGNOSTIC EVALUATION: CPT | Mod: 95 | Performed by: SOCIAL WORKER

## 2023-01-25 ASSESSMENT — COLUMBIA-SUICIDE SEVERITY RATING SCALE - C-SSRS
6. HAVE YOU EVER DONE ANYTHING, STARTED TO DO ANYTHING, OR PREPARED TO DO ANYTHING TO END YOUR LIFE?: NO
ATTEMPT LIFETIME: NO
TOTAL  NUMBER OF INTERRUPTED ATTEMPTS LIFETIME: NO
1. IN THE PAST MONTH, HAVE YOU WISHED YOU WERE DEAD OR WISHED YOU COULD GO TO SLEEP AND NOT WAKE UP?: YES
REASONS FOR IDEATION PAST MONTH: MOSTLY TO END OR STOP THE PAIN (YOU COULDN'T GO ON LIVING WITH THE PAIN OR HOW YOU WERE FEELING)
2. HAVE YOU ACTUALLY HAD ANY THOUGHTS OF KILLING YOURSELF?: NO
1. HAVE YOU WISHED YOU WERE DEAD OR WISHED YOU COULD GO TO SLEEP AND NOT WAKE UP?: YES
TOTAL  NUMBER OF ABORTED OR SELF INTERRUPTED ATTEMPTS LIFETIME: NO
REASONS FOR IDEATION LIFETIME: MOSTLY TO END OR STOP THE PAIN (YOU COULDN'T GO ON LIVING WITH THE PAIN OR HOW YOU WERE FEELING)

## 2023-01-25 ASSESSMENT — PATIENT HEALTH QUESTIONNAIRE - PHQ9
10. IF YOU CHECKED OFF ANY PROBLEMS, HOW DIFFICULT HAVE THESE PROBLEMS MADE IT FOR YOU TO DO YOUR WORK, TAKE CARE OF THINGS AT HOME, OR GET ALONG WITH OTHER PEOPLE: NOT DIFFICULT AT ALL
SUM OF ALL RESPONSES TO PHQ QUESTIONS 1-9: 5
SUM OF ALL RESPONSES TO PHQ QUESTIONS 1-9: 5

## 2023-01-25 NOTE — PROGRESS NOTES
"[facility]      PATIENT'S NAME: Neptali Moore  PREFERRED NAME: Neptali  PRONOUNS:       MRN: [id number]  : 1950  ADDRESS: [address] [place] [id number]  ACCT. NUMBER:  [id number]  DATE OF SERVICE: 23  START TIME: 10:39 am   END TIME: 11:36 am   PREFERRED PHONE: [phone]  May we leave a program related message: Yes  SERVICE MODALITY:  Video Visit:      Provider verified identity through the following two step process.  Patient provided:  Patient photo, Patient  and Patient address    Telemedicine Visit: The patient's condition can be safely assessed and treated via synchronous audio and visual telemedicine encounter.      Reason for Telemedicine Visit: Patient has requested telehealth visit    Originating Site (Patient Location): Patient's home    Distant Site (Provider Location): Provider Remote Setting- Home Office    Consent:  The patient/guardian has verbally consented to: the potential risks and benefits of telemedicine (video visit) versus in person care; bill my insurance or make self-payment for services provided; and responsibility for payment of non-covered services.     Patient would like the video invitation sent by:  My Chart    Mode of Communication:  Video Conference via [employer]    Distant Location (Provider):  Off-site    As the provider I attest to compliance with applicable laws and regulations related to telemedicine.    UNIVERSAL ADULT Mental Health DIAGNOSTIC ASSESSMENT    Identifying Information:  Patient is a 72 year old,  individual.  [facility]  Patient attended the session alone.    Chief Complaint:   The reason for seeking services at this time is: \" depression \".  The problem(s) began in adulthood and has occurred for \"quite a while\". Patient has attempted to resolve these concerns in the past through Therapy services in the past.    Social/Family History:  Patient reported " "[place]  They were raised by biological parents.  Parents stayed ..   Patient reported that their childhood was \"I think I have had a serotonin deficiency my entire life. They argued a lot, but had a reasonable marriage\". He reports at a young age he took care of younger siblings.  Patient described their current relationships with family of origin as: relationship with children and dating someone whom he is happy with.     The patient describes their cultural background as \"grew in a Latter day family and thought about being a  then realized he did not want to be celibate\" . He does not really believe in catholicism any longer.  Cultural influences and impact on patient's life structure, values, norms, and healthcare: Spiritual Beliefs: Bahai upbringing, atttend mass, but taken lightly.  Contextual influences on patient's health include: none reported.  Cultural, Contextual, and socioeconomic factors do not affect the patient's access to services.  These factors will be addressed in the Preliminary Treatment plan.  Patient identified their preferred language to be English. Patient reported they do not  need the assistance of an  or other support involved in therapy.     Patient reported had no significant delays in developmental tasks.   Patient's highest education level was college graduate. Patient identified the following learning problems: none reported.  Modifications will not be used to assist communication in therapy.   Patient reports they are  able to understand written materials.    Patient reported the following relationship history of being in a relationship for three years.  Patient's current relationship status is in a relationship  for 3 years.   Patient identified their sexual orientation as heterosexual.  Patient reported having two child(nathalia). Patient identified partner and adult child as part of their support system.  Patient " identified the quality of these relationships as stable and meaningful.     Patient's current living/housing situation involves staying in own home/apartment.  They live with alone and they report that housing is stable.     Patient is currently retired.  Patient reports their finances are obtained through noted other.  Patient does not identify finances as a current stressor.      Patient reported that they have been involved with the legal system. He reports he shared custody with his children's mother.   Patient denies being on probation / parole / under the jurisdiction of the court.      Patient's Strengths and Limitations:  Patient identified the following strengths or resources that will help them succeed in treatment: commitment to health and well being, community involvement, exercise routine, friends / good social support, family support, insight, intelligence, motivation and strong social skills. Things that may interfere with the patient's success in treatment include: physical health concerns.     Assessments:  PHQ9:   PHQ-9 SCORE 7/28/2020 3/3/2021 8/12/2021 10/8/2021 4/26/2022 9/26/2022 1/25/2023   PHQ-9 Total Score MyChart 6 (Mild depression) - 8 (Mild depression) - 5 (Mild depression) - 5 (Mild depression)   PHQ-9 Total Score 6 3 8 4 5 2 5     GAD7:   ANGELICA-7 SCORE 10/1/2019 10/30/2019 2/2/2020 3/3/2021 8/12/2021 10/8/2021 4/26/2022   Total Score - - 3 (minimal anxiety) - 10 (moderate anxiety) - 5 (mild anxiety)   Total Score 7 7 3 2 10 2 5     CAGE-AID:   CAGE-AID Total Score 1/19/2023   Total Score 0   Total Score MyChart 0 (A total score of 2 or greater is considered clinically significant)     PROMIS 10-Global Health (all questions and answers displayed):   PROMIS 10 1/19/2023   In general, would you say your health is: Very good   In general, would you say your quality of life is: Very good   In general, how would you rate your physical health? Very good   In general, how would you rate your  mental health, including your mood and your ability to think? Good   In general, how would you rate your satisfaction with your social activities and relationships? Good   In general, please rate how well you carry out your usual social activities and roles Excellent   To what extent are you able to carry out your everyday physical activities such as walking, climbing stairs, carrying groceries, or moving a chair? Completely   How often have you been bothered by emotional problems such as feeling anxious, depressed or irritable? Sometimes   How would you rate your fatigue on average? Mild   How would you rate your pain on average?   0 = No Pain  to  10 = Worst Imaginable Pain 2   In general, would you say your health is: 4   In general, would you say your quality of life is: 4   In general, how would you rate your physical health? 4   In general, how would you rate your mental health, including your mood and your ability to think? 3   In general, how would you rate your satisfaction with your social activities and relationships? 3   In general, please rate how well you carry out your usual social activities and roles. (This includes activities at home, at work and in your community, and responsibilities as a parent, child, spouse, employee, friend, etc.) 5   To what extent are you able to carry out your everyday physical activities such as walking, climbing stairs, carrying groceries, or moving a chair? 5   In the past 7 days, how often have you been bothered by emotional problems such as feeling anxious, depressed, or irritable? 3   In the past 7 days, how would you rate your fatigue on average? 2   In the past 7 days, how would you rate your pain on average, where 0 means no pain, and 10 means worst imaginable pain? 2   Global Mental Health Score 13   Global Physical Health Score 17   PROMIS TOTAL - SUBSCORES 30   Some recent data might be hidden     [place] Suicide Severity Rating Scale  "(Lifetime/Recent)  [place] Suicide Severity Rating (Lifetime/Recent) 1/25/2023   1. Wish to be Dead (Lifetime) 1   Wish to be Dead Description (Lifetime) Wished he was not alive at couple times. He reports in undergraduate school he was wanting to not be around and remembers pulling the covers over his head and wishing he weren't alive.   1. Wish to be Dead (Past 1 Month) 1   Wish to be Dead Description (Past 1 Month) 2-3 weeks ago he reports he had a passing \"dark thoughts, feeling alone\"   2. Non-Specific Active Suicidal Thoughts (Lifetime) 0   Most Severe Ideation Rating (Lifetime) 1   Most Severe Ideation Rating (Past 1 Month) 1   Frequency (Lifetime) 1   Frequency (Past 1 Month) 1   Duration (Lifetime) 1   Duration (Past 1 Month) 1   Controllability (Lifetime) 4   Controllability (Past 1 Month) 4   Deterrents (Lifetime) 2   Deterrents (Past 1 Month) 2   Reasons for Ideation (Lifetime) 4   Reasons for Ideation (Past 1 Month) 4   Actual Attempt (Lifetime) 0   Interrupted Attempts (Lifetime) 0   Aborted or Self-Interrupted Attempt (Lifetime) 0   Preparatory Acts or Behavior (Lifetime) 0   Calculated C-SSRS Risk Score (Lifetime/Recent) Low Risk       Personal and Family Medical History:  Patient does report a family history of mental health concerns.  Patient reports family history includes Arthritis in his mother; Cerebrovascular Disease in his father; Dementia in his mother; Depression in his nephew and another family member; Mental Illness in his brother, cousin, and nephew; Obesity in his father and mother; Osteoporosis in his mother; Other Cancer in his brother; Pacemaker in his father; Substance Abuse in his brother, brother, and nephew..     Patient does report Mental Health Diagnosis and/or Treatment.  Patient Patient reported the following previous diagnoses which include(s): Depression.  Patient reported symptoms began in adulthood several years ago.   Patient has received mental health services in the " past: therapy.  Psychiatric Hospitalizations: None.  Patient denies a history of civil commitment.  Patient is receiving other mental health services.  These include primary care provider at [facility].  For follow-up on TBD.         Patient has had a physical exam to rule out medical causes for current symptoms.  Date of last physical exam was within the past year. Client was encouraged to follow up with PCP if symptoms were to develop. The patient has a [facility] Primary Care Provider, who is named Mark Jones..  Patient reports the following current medical concerns: :surgeries on knees, shoulders and pinched nerves.  Patient reports pain concerns including : pain with knee when not wearing a brace, injections help.  Patient does not want help addressing pain concerns..   There are not significant appetite / nutritional concerns / weight changes. These may include: no concerns. Patient reports the following sleep concerns:  Delayed sleep onset due to physical pain with nerve pain in his shoulder. He reports Gabapentin does help with the pain.    Patient does not report a history of head injury / trauma / cognitive impairment.     Patient reports current meds as:   Outpatient Medications Marked as Taking for the 1/25/23 encounter (Virtual Visit) with Maddison Mukherjee LICSW   Medication Sig     buPROPion (WELLBUTRIN XL) 150 MG 24 hr tablet Take 1 tablet (150 mg) by mouth every morning     buPROPion (WELLBUTRIN XL) 300 MG 24 hr tablet Take 1 tablet (300 mg) by mouth every morning     gabapentin (NEURONTIN) 300 MG capsule Take 2 capsules (600 mg) by mouth 3 times daily       Medication Adherence:  Patient reports taking prescribed medications as prescribed.    Patient Allergies:  No Known Allergies    Medical History:    Past Medical History:   Diagnosis Date     Arthritis      Depressive disorder see chart         Current Mental Status Exam:   Appearance:  Appropriate    Eye Contact:  Good    Psychomotor:  Normal       Gait / station:  no problem  Attitude / Demeanor: Cooperative  Friendly Pleasant  Speech      Rate / Production: Normal/ Responsive      Volume:  Normal  volume      Language:  intact  Mood:   Normal  Affect:   Appropriate    Thought Content: Clear   Thought Process: Coherent  Logical       Associations: No loosening of associations  Insight:   Good   Judgment:  Intact   Orientation:  All  Attention/concentration: Good      Substance Use:  Patient did report a family history of substance use concerns; see medical history section for details.  He has one brother with substance use concerns. Patient has not received chemical dependency treatment in the past.  Patient has not ever been to detox.      Patient is not currently receiving any chemical dependency treatment. Patient reported the following problems as a result of their substance use:  none.    Patient reports using alcohol 1-2 times per week and has 1/2 bottle of wine between him and his partner. Patient first started drinking at age high school.  Patient reported date of last use was 1/14/2023.  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  Patient denies using tobacco.  Patient denies using cannabis.  Patient denies using caffeine.  Patient reports using/abusing the following substance(s). Patient reported no other substance use.     Substance Use: No symptoms    Based on the negative CAGE score and clinical interview there  are not indications of drug or alcohol abuse.      Significant Losses / Trauma / Abuse / Neglect Issues:   Patient did not serve in the .  There are indications or report of significant loss, trauma, abuse or neglect issues related to: are no indications and client denies any losses, trauma, abuse, or neglect concerns. He reports his father used corporal punishment, however it was common he notes when his father did this.   Concerns for possible neglect are not present.     Safety Assessment:    Patient denies current homicidal ideation and behaviors.  Patient denies current self-injurious ideation and behaviors.    Patient denied risk behaviors associated with substance use.  Patient denies any high risk behaviors associated with mental health symptoms.  Patient reports the following current concerns for their personal safety: None.  Patient reports there are not firearms in the house.      History of Safety Concerns:  Patient denied a history of homicidal ideation.     Patient denied a history of personal safety concerns.    Patient denied a history of assaultive behaviors.    Patient denied a history of sexual assault behaviors.     Patient denied a history of risk behaviors associated with substance use.  Patient denies any history of high risk behaviors associated with mental health symptoms.  Patient reports the following protective factors: forward or future oriented thinking; dedication to family or friends; safe and stable environment; effectively controls impulses; regular physical activity; purpose; secure attachment; abstinence from substances; adherence with prescribed medication; daily obligations; effective problem solving skills; commitment to well being; positive social skills; financial stability; strong sense of self worth or esteem; sense of personal control or determination; access to a variety of clinical interventions and pets    Risk Plan:  See Recommendations for Safety and Risk Management Plan    Review of Symptoms per patient report:   Depression: Lack of interest, Feelings of hopelessness, Feelings of helplessness, Feeling sad, down, or depressed and Withdrawn  Hazel:  Mood swings at times, high energy  Psychosis: No Symptoms  Anxiety: Excessive worry and Irritability  Panic:  No symptoms  Post Traumatic Stress Disorder:  No Symptoms   Eating Disorder: No Symptoms  ADD / ADHD:  Trouble reading, difficulty changing focus  Conduct Disorder: No symptoms  Autism Spectrum  Disorder: No symptoms  Obsessive Compulsive Disorder: Checking, Counting and Obsessions    Patient reports the following compulsive behaviors and treatment history: None reported.      Diagnostic Criteria:    Major Depressive Disorder  CRITERIA (A-C) REPRESENT A MAJOR DEPRESSIVE EPISODE - SELECT THESE CRITERIA  A) Recurrent episode(s) - symptoms have been present during the same 2-week period and represent a change from previous functioning 5 or more symptoms (required for diagnosis)   - Depressed mood. Note: In children and adolescents, can be irritable mood.     - Diminished interest or pleasure in all, or almost all, activities.    - Fatigue or loss of energy.    - Feelings of worthlessness or inappropriate and excessive guilt.    - Diminished ability to think or concentrate, or indecisiveness.   B) The symptoms cause clinically significant distress or impairment in social, occupational, or other important areas of functioning  C) The episode is not attributable to the physiological effects of a substance or to another medical condition  D) The occurence of major depressive episode is not better explained by other thought / psychotic disorders  E) There has never been a manic episode or hypomanic episode    Functional Status:  Patient reports the following functional impairments:  management of the household and or completion of tasks.     Nonprogrammatic care:  Patient is requesting basic services to address current mental health concerns.    Clinical Summary:  1. Reason for assessment: depression.  2. Psychosocial, Cultural and Contextual Factors: Raised Muslim .  3. Principal DSM5 Diagnoses  (Sustained by DSM5 Criteria Listed Above):   296.32 (F33.1) Major Depressive Disorder, Recurrent Episode, Moderate _.  4. Other Diagnoses that is relevant to services: None, possible OCD and Anxiety   5. Provisional Diagnosis:  None  6. Prognosis: Maintain Current Status / Prevent Deterioration.  7. Likely consequences  of symptoms if not treated: Worsening symptoms.  8. Client strengths include:  educated, has a previous history of therapy, intelligent, motivated, open to learning, open to suggestions / feedback, support of family, friends and providers, wants to learn and willing to ask questions .     Recommendations:     1. Plan for Safety and Risk Management:   Safety and Risk: Recommended that patient call 911 or go to the local ED should there be a change in any of these risk factors. Client reports thoughts of not wanting to be around/alive without plan to harm himself and denies being suicidal. He has never harmed himself in the past. This writer discussed that someone may want to do a safety plan with him in the future at some point.       Report to child / adult protection services was NA.     2. Patient's identified Faith upbringing.     3. Initial Treatment will focus on:    Depressed Mood - wanting to focus on CBT for depression.     4. Resources/Service Plan:    services are not indicated.   Modifications to assist communication are not indicated.   Additional disability accommodations are not indicated.      5. Collaboration:   Collaboration / coordination of treatment will be initiated with the following  support professionals: primary care physician.      6.  Referrals:   The following referral(s) will be initiated: CBT. Next Scheduled Appointment: None.      A Release of Information has been obtained for the following: None.     Emergency Contact  was obtained.      Clinical Substantiation/medical necessity for the above recommendations:  Individual therapy appears appropriate.    7. PEGGY:    PEGGY: no problems identified. Provider gave patient printed information about the  effects of chemical use on their health and well being. Recommendations:  No concerns, so no recommendations .     8. Records:   These were reviewed at time of assessment.   Information in this assessment was obtained from the  medical record and  provided by patient who is a good historian.    Patient will have open access to their mental health medical record.    9.   Interactive Complexity: No      Provider Name/ Credentials:  [facility]  January 25, 2023        Answers for HPI/ROS submitted by the patient on 1/25/2023  If you checked off any problems, how difficult have these problems made it for you to do your work, take care of things at home, or get along with other people?: Not difficult at all  PHQ9 TOTAL SCORE: 5

## 2023-02-01 ENCOUNTER — DOCUMENTATION ONLY (OUTPATIENT)
Dept: PSYCHOLOGY | Facility: CLINIC | Age: 73
End: 2023-02-01
Payer: COMMERCIAL

## 2023-02-01 NOTE — PROGRESS NOTES
Referral/Transfer of an [facility] Client to Another [facility] Therapist    CLIENT'S NAME: Neptali Moore  DATE of Referral/Transfer Request:  February 1, 2023    Referral/Transfer Request Information    Transfer for the same service: Client and Therapist Initiated    Client Phone #:  [phone] (home)        Telephone Information:   Mobile [phone]         Facilitating Referral/Transfer: Requesting more structured CBT and this writer does not utilized a structured CBT program     Current Therapist: KELVIN Hunter, LEATHA      Therapist Receiving Referral/Transfer: Lizbeth Berman    Referral/Transfer is for: Individual    Rationale for Referral/Transfer: (to be completed by [facility] staff person initiating the referral)  Situation: (What is going on with the client?)  Depression      Background: (What is the clinical background or context?)  Client reports ongoing depression and wanting to do more structured CBT      Assessment: (What do you think the problem is?)   Depression      Recommendation: (What would you do to correct it?)  CBT Therapy per client's request.       Referral/Transfer Status:    Therapist Initiated Referral:  Therapist contacted  to review insurance implications of referral.  Insurance benefits information was communicated to the client.   Therapist receiving referral has been informed of and has accepted the referral/Was routed this form in an [employer] message  Referral/Transfer Completion Date: 2/1/2023.  Completed by: Intake team      KELVIN Hunter LADC  February 1, 2023

## 2023-02-06 ENCOUNTER — TRANSFERRED RECORDS (OUTPATIENT)
Dept: HEALTH INFORMATION MANAGEMENT | Facility: CLINIC | Age: 73
End: 2023-02-06

## 2023-04-26 ENCOUNTER — MYC MEDICAL ADVICE (OUTPATIENT)
Dept: FAMILY MEDICINE | Facility: CLINIC | Age: 73
End: 2023-04-26
Payer: COMMERCIAL

## 2023-04-26 DIAGNOSIS — H93.19 TINNITUS, UNSPECIFIED LATERALITY: Primary | ICD-10-CM

## 2023-04-28 NOTE — TELEPHONE ENCOUNTER
Writer responded via [employer].    Manju Arguello, NANYN RN  [facility]

## 2023-05-02 NOTE — TELEPHONE ENCOUNTER
No satisfactory treatment options for tinnitus but it can be sign of hearing loss.   ENT referral signed. Thank you.

## 2023-05-02 NOTE — TELEPHONE ENCOUNTER
Do you want pt to see you for tinnitus? Otherwise pended ENT referral    NANY SandersN RN  [facility]

## 2023-05-02 NOTE — TELEPHONE ENCOUNTER
Called patient to let them know ENT referral was placed. Gave patient number to call if no one reaches out to them to get that appointment scheduled. Patient also says thank you for the referral

## 2023-05-11 ENCOUNTER — IMMUNIZATION (OUTPATIENT)
Dept: NURSING | Facility: CLINIC | Age: 73
End: 2023-05-11
Payer: COMMERCIAL

## 2023-05-11 PROCEDURE — 0134A COVID-19 BIVALENT 18+ (MODERNA): CPT

## 2023-05-11 PROCEDURE — 91313 COVID-19 BIVALENT 18+ (MODERNA): CPT

## 2023-05-22 NOTE — TELEPHONE ENCOUNTER
FUTURE VISIT INFORMATION      FUTURE VISIT INFORMATION:    Date: 7/27/23    Time: 9:40    Location: [facility]  REFERRAL INFORMATION:    Referring provider:  Mark Jones MD     Referring providers clinic:  [facility]    Reason for visit/diagnosis  Tinnitus, unspecified laterality [H93.19] ref by Mark Jones MD in [facility], recs in epic - sched per pt    RECORDS REQUESTED FROM:       Clinic name Comments Records Status Imaging Status   [facility]  4/26/23- MYCHART WITH Mark Jones MD  Epic                                       
- - -

## 2023-07-03 DIAGNOSIS — Z01.10 ENCOUNTER FOR HEARING TEST: Primary | ICD-10-CM

## 2023-07-27 ENCOUNTER — OFFICE VISIT (OUTPATIENT)
Dept: AUDIOLOGY | Facility: CLINIC | Age: 73
End: 2023-07-27
Payer: COMMERCIAL

## 2023-07-27 ENCOUNTER — PRE VISIT (OUTPATIENT)
Dept: OTOLARYNGOLOGY | Facility: CLINIC | Age: 73
End: 2023-07-27

## 2023-07-27 ENCOUNTER — OFFICE VISIT (OUTPATIENT)
Dept: OTOLARYNGOLOGY | Facility: CLINIC | Age: 73
End: 2023-07-27
Payer: COMMERCIAL

## 2023-07-27 VITALS
HEIGHT: 72 IN | HEART RATE: 54 BPM | SYSTOLIC BLOOD PRESSURE: 135 MMHG | DIASTOLIC BLOOD PRESSURE: 75 MMHG | TEMPERATURE: 97.9 F | OXYGEN SATURATION: 97 % | WEIGHT: 175 LBS | BODY MASS INDEX: 23.7 KG/M2

## 2023-07-27 DIAGNOSIS — H90.3 SENSORINEURAL HEARING LOSS (SNHL) OF BOTH EARS: Primary | ICD-10-CM

## 2023-07-27 DIAGNOSIS — H91.93 BILATERAL HIGH FREQUENCY HEARING LOSS: ICD-10-CM

## 2023-07-27 DIAGNOSIS — H93.13 TINNITUS, BILATERAL: Primary | ICD-10-CM

## 2023-07-27 PROCEDURE — 92557 COMPREHENSIVE HEARING TEST: CPT | Performed by: AUDIOLOGIST

## 2023-07-27 PROCEDURE — 92565 STENGER TEST PURE TONE: CPT | Performed by: AUDIOLOGIST

## 2023-07-27 PROCEDURE — 92550 TYMPANOMETRY & REFLEX THRESH: CPT | Performed by: AUDIOLOGIST

## 2023-07-27 PROCEDURE — 99203 OFFICE O/P NEW LOW 30 MIN: CPT | Performed by: REGISTERED NURSE

## 2023-07-27 ASSESSMENT — PAIN SCALES - GENERAL: PAINLEVEL: NO PAIN (0)

## 2023-07-27 NOTE — PROGRESS NOTES
Otolaryngology Clinic  July 27, 2023    Chief Complaint:   Bilateral tinnitus       History of Present Illness:   Neptali Moore is a 73 year old male who presents today for evaluation of bilateral tinnitus.  Patient states that symptoms have been present for at least a year.  Reports a bilateral static noise that is constant.  Most bothersome when in a quiet environment.  Patient does report difficulty hearing in crowded/noisy environments such as restaurants.  His partner reports that he is not hearing them as well. Patient denies any otalgia, otorrhea, dizziness, facial numbness/weakness, history of frequent ear infections, or ear surgeries.  Patient denies a family history of hearing loss or history of concussions.  Reports some noise exposure from concerts when he was younger and working with power tools, however, he patient does wear hearing protection when using tools.    Past Medical History:  Past Medical History:   Diagnosis Date    Arthritis     Depressive disorder see chart     Past Surgical History:  Past Surgical History:   Procedure Laterality Date    BIOPSY  this year    upper endoscopy,skin chest    COLONOSCOPY      biopsies    ESOPHAGOSCOPY, GASTROSCOPY, DUODENOSCOPY (EGD), COMBINED N/A 08/06/2019    Procedure: ESOPHAGOGASTRODUODENOSCOPY, WITH BIOPSY;  Surgeon: Oliva Youngblood MD;  Location: [facility] OR    EXTRACTION(S) DENTAL      KNEE SURGERY      bilateral; partial meniscus removal    ORTHOPEDIC SURGERY  3 years ago, decades ago    shoulder, rotator cuff, elbow    SOFT TISSUE SURGERY  20 + years ago    medial meniscus removal (both sides, different times)    TONSILLECTOMY       Medications:  Current Outpatient Medications   Medication Sig Dispense Refill    buPROPion (WELLBUTRIN XL) 150 MG 24 hr tablet Take 1 tablet (150 mg) by mouth every morning 90 tablet 3    buPROPion (WELLBUTRIN XL) 300 MG 24 hr tablet Take 1 tablet (300 mg) by mouth every morning 90 tablet 3    gabapentin  (NEURONTIN) 300 MG capsule Take 2 capsules (600 mg) by mouth 3 times daily       Allergies:  No Known Allergies     Social History:  Social History     Tobacco Use    Smoking status: Never    Smokeless tobacco: Never   Vaping Use    Vaping Use: Never used   Substance Use Topics    Alcohol use: Yes     Alcohol/week: 3.0 - 6.0 standard drinks of alcohol     Types: 1 - 2 Glasses of wine, 1 - 2 Cans of beer, 1 - 2 Shots of liquor per week     Comment: 2-3 drinks/week    Drug use: No       ROS: 10 point ROS neg other than the symptoms noted above in the HPI.    Physical Exam:    There were no vitals taken for this visit.     Constitutional:  The patient was unaccompanied, well-groomed, and in no acute distress.     Skin: Normal:  warm and pink without rash    Neurologic: Alert and oriented x 3.  CN's III-XII within normal limits.  Voice normal.    Psychiatric: The patient's affect was calm, cooperative, and appropriate.     Communication:  Normal; communicates verbally, normal voice quality.    Respiratory: Breathing comfortably without stridor or exertion of accessory muscles.    Ears: Pinnae and tragus non-tender.  EAC's and TM's were clear.       Audiogram: 7/27/2023 - data independently reviewed  Normal steeply sloping to moderately severe SNHL  WR right: 96% left: 100% at 55 dB  Tympanograms: type A bilaterally    Assessment and Plan:  1. Tinnitus, bilateral  Patient with history of bilateral tinnitus.  Reviewed audiogram today with patient which shows bilateral high frequency hearing loss.  Suspect that this hearing loss could be contributing to tinnitus symptoms.  Explained to patient that tinnitus is a central process, meaning that it is a brain generated noise but that the brain can generate this noise due to loss of hearing in certain frequencies.  Reviewed other factors that can contribute to tinnitus including stress, anxiety, lack of sleep, and neck/muscle tension.     Explained to patient that there is no  specific medication or procedure that can eliminate tinnitus, however, there are evidence-based management strategies that can be used to improve symptoms.  Discussed management strategies with patient including tinnitus masking and cognitive behavioral approaches.     Discussed hearing aids with patient. Patient is a borderline candidate. Discussed trial period with patient. Patient would like to monitor at this time.     Recommend patient return to clinic in 2-3 years for recheck of hearing. Sooner for any worsening tinnitus or hearing.    Lorena Mendoza DNP, APRN, CNP  Otolaryngology  Head & Neck Surgery  [phone]    30 minutes spent on the date of the encounter doing chart review, history and exam, documentation and further activities per the note.

## 2023-07-27 NOTE — LETTER
7/27/2023       RE: Neptali Moore  [address] [facility] [id number]     Dear Colleague,    Thank you for referring your patient, Neptali Moore, to the [facility] [place] at [facility]. Please see a copy of my visit note below.      Otolaryngology Clinic  July 27, 2023    Chief Complaint:   Bilateral tinnitus       History of Present Illness:   Neptali Moore is a 73 year old male who presents today for evaluation of bilateral tinnitus.  Patient states that symptoms have been present for at least a year.  Reports a bilateral static noise that is constant.  Most bothersome when in a quiet environment.  Patient does report difficulty hearing in crowded/noisy environments such as restaurants.  His partner reports that he is not hearing them as well. Patient denies any otalgia, otorrhea, dizziness, facial numbness/weakness, history of frequent ear infections, or ear surgeries.  Patient denies a family history of hearing loss or history of concussions.  Reports some noise exposure from concerts when he was younger and working with power tools, however, he patient does wear hearing protection when using tools.    Past Medical History:  Past Medical History:   Diagnosis Date    Arthritis     Depressive disorder see chart     Past Surgical History:  Past Surgical History:   Procedure Laterality Date    BIOPSY  this year    upper endoscopy,skin chest    COLONOSCOPY      biopsies    ESOPHAGOSCOPY, GASTROSCOPY, DUODENOSCOPY (EGD), COMBINED N/A 08/06/2019    Procedure: ESOPHAGOGASTRODUODENOSCOPY, WITH BIOPSY;  Surgeon: Oliva Youngblood MD;  Location: [facility] OR    EXTRACTION(S) DENTAL      KNEE SURGERY      bilateral; partial meniscus removal    ORTHOPEDIC SURGERY  3 years ago, decades ago    shoulder, rotator cuff, elbow    SOFT TISSUE SURGERY  20 + years ago    medial meniscus removal (both sides, different times)     TONSILLECTOMY       Medications:  Current Outpatient Medications   Medication Sig Dispense Refill    buPROPion (WELLBUTRIN XL) 150 MG 24 hr tablet Take 1 tablet (150 mg) by mouth every morning 90 tablet 3    buPROPion (WELLBUTRIN XL) 300 MG 24 hr tablet Take 1 tablet (300 mg) by mouth every morning 90 tablet 3    gabapentin (NEURONTIN) 300 MG capsule Take 2 capsules (600 mg) by mouth 3 times daily       Allergies:  No Known Allergies     Social History:  Social History     Tobacco Use    Smoking status: Never    Smokeless tobacco: Never   Vaping Use    Vaping Use: Never used   Substance Use Topics    Alcohol use: Yes     Alcohol/week: 3.0 - 6.0 standard drinks of alcohol     Types: 1 - 2 Glasses of wine, 1 - 2 Cans of beer, 1 - 2 Shots of liquor per week     Comment: 2-3 drinks/week    Drug use: No       ROS: 10 point ROS neg other than the symptoms noted above in the HPI.    Physical Exam:    There were no vitals taken for this visit.     Constitutional:  The patient was unaccompanied, well-groomed, and in no acute distress.     Skin: Normal:  warm and pink without rash    Neurologic: Alert and oriented x 3.  CN's III-XII within normal limits.  Voice normal.    Psychiatric: The patient's affect was calm, cooperative, and appropriate.     Communication:  Normal; communicates verbally, normal voice quality.    Respiratory: Breathing comfortably without stridor or exertion of accessory muscles.    Ears: Pinnae and tragus non-tender.  EAC's and TM's were clear.       Audiogram: 7/27/2023 - data independently reviewed  Normal steeply sloping to moderately severe SNHL  WR right: 96% left: 100% at 55 dB  Tympanograms: type A bilaterally    Assessment and Plan:  1. Tinnitus, bilateral  Patient with history of bilateral tinnitus.  Reviewed audiogram today with patient which shows bilateral high frequency hearing loss.  Suspect that this hearing loss could be contributing to tinnitus symptoms.  Explained to patient that  tinnitus is a central process, meaning that it is a brain generated noise but that the brain can generate this noise due to loss of hearing in certain frequencies.  Reviewed other factors that can contribute to tinnitus including stress, anxiety, lack of sleep, and neck/muscle tension.     Explained to patient that there is no specific medication or procedure that can eliminate tinnitus, however, there are evidence-based management strategies that can be used to improve symptoms.  Discussed management strategies with patient including tinnitus masking and cognitive behavioral approaches.     Discussed hearing aids with patient. Patient is a borderline candidate. Discussed trial period with patient. Patient would like to monitor at this time.     Recommend patient return to clinic in 2-3 years for recheck of hearing. Sooner for any worsening tinnitus or hearing.    Lorena Mendoza DNP, APRN, CNP  Otolaryngology  Head & Neck Surgery  [phone]    30 minutes spent on the date of the encounter doing chart review, history and exam, documentation and further activities per the note.        Again, thank you for allowing me to participate in the care of your patient.      Sincerely,    Carol Ann Mendoza, NP

## 2023-07-27 NOTE — PROGRESS NOTES
AUDIOLOGY REPORT     SUMMARY: Audiology visit completed. See audiogram for results.       RECOMMENDATIONS: Follow-up with ENT.     Kaycee Schilling CCC-A  Licensed Audiologist   [place] #[id number]

## 2023-07-27 NOTE — NURSING NOTE
Chief Complaint   Patient presents with    Consult     Tinnitus    .Blood pressure 135/75, pulse 54, temperature 97.9  F (36.6  C), height 1.829 m (6'), weight 79.4 kg (175 lb), SpO2 97 %.    Ken Larson LPN

## 2023-08-28 ENCOUNTER — NURSE TRIAGE (OUTPATIENT)
Dept: FAMILY MEDICINE | Facility: CLINIC | Age: 73
End: 2023-08-28
Payer: COMMERCIAL

## 2023-08-28 ENCOUNTER — OFFICE VISIT (OUTPATIENT)
Dept: FAMILY MEDICINE | Facility: CLINIC | Age: 73
End: 2023-08-28
Payer: COMMERCIAL

## 2023-08-28 VITALS
WEIGHT: 173 LBS | HEART RATE: 50 BPM | TEMPERATURE: 97 F | OXYGEN SATURATION: 99 % | RESPIRATION RATE: 14 BRPM | BODY MASS INDEX: 23.46 KG/M2 | SYSTOLIC BLOOD PRESSURE: 129 MMHG | DIASTOLIC BLOOD PRESSURE: 78 MMHG

## 2023-08-28 DIAGNOSIS — R42 DIZZINESS: Primary | ICD-10-CM

## 2023-08-28 PROCEDURE — 99214 OFFICE O/P EST MOD 30 MIN: CPT

## 2023-08-28 NOTE — PROGRESS NOTES
Assessment & Plan     Dizziness  Brief, not returned. No further symptoms. Vitals are normal. Exam normal. Discussed unlikely TIA as no typical stroke symptoms and brief in nature. If symptoms return or are not brief than to ER or if stronger symptoms of stroke/TIA .             No follow-ups on file.    [facility]    Arline Gunderson is a 73 year old male who presents to clinic today for the following health issues:  Chief Complaint   Patient presents with    Dizziness     Today and weird smell      HPI    Was at home and working at desk  Weak/smell strange and that was over after sitting outside.  No symptoms now.  No dizzyness  No headache.  Able to walk and eat and drink okay.  No BV or double vision  No CP/SOB.  No N/V/D        Review of Systems        Objective    /78   Pulse 50   Temp 97  F (36.1  C) (Tympanic)   Resp 14   Wt 78.5 kg (173 lb)   SpO2 99%   BMI 23.46 kg/m    Physical Exam  Vitals and nursing note reviewed.   Constitutional:       Appearance: Normal appearance.   HENT:      Head: Normocephalic and atraumatic.      Right Ear: Tympanic membrane normal.      Left Ear: Tympanic membrane normal.      Mouth/Throat:      Mouth: Mucous membranes are moist.   Eyes:      Extraocular Movements: Extraocular movements intact.      Pupils: Pupils are equal, round, and reactive to light.   Cardiovascular:      Rate and Rhythm: Normal rate and regular rhythm.      Pulses: Normal pulses.      Heart sounds: Normal heart sounds.   Pulmonary:      Effort: Pulmonary effort is normal.      Breath sounds: Normal breath sounds.   Musculoskeletal:         General: Normal range of motion.      Cervical back: Neck supple.   Neurological:      General: No focal deficit present.      Mental Status: He is alert and oriented to person, place, and time.      Cranial Nerves: No cranial nerve deficit.      Sensory: No sensory  deficit.      Motor: No weakness.      Coordination: Coordination normal.      Gait: Gait normal.   Psychiatric:         Mood and Affect: Mood normal.         Behavior: Behavior normal.

## 2023-08-28 NOTE — PATIENT INSTRUCTIONS
If the symptoms you experienced return or persist more than a few minutes than evaluation in ER would be warranted.

## 2023-08-28 NOTE — TELEPHONE ENCOUNTER
"Writer spoke with Dr. Jones who advised to go to Urgent Care due to unusual symptoms and ongoing feeling unusual.   Patient stated understanding and sounded like he would go to the urgent care at [place].     Writer went through stroke symptoms with patient. Patient did have difficulty walking for a minute or two. Once outside he was fine with walking and no longer felt dizzy, but felt slightly \"off.\"    1. DESCRIPTION: \"Describe your dizziness.\"      Smelled something that was very strange. First inclination was to go outside. Felt better once went outside. Called fire department and they did not find anything.   2. LIGHTHEADED: \"Do you feel lightheaded?\" (e.g., somewhat faint, woozy, weak upon standing)      Felt faint, dizzy.   3. VERTIGO: \"Do you feel like either you or the room is spinning or tilting?\" (i.e. vertigo)      No.   4. SEVERITY: \"How bad is it?\"  \"Do you feel like you are going to faint?\" \"Can you stand and walk?\"    - MILD: Feels slightly dizzy, but walking normally.    - MODERATE: Feels unsteady when walking, but not falling; interferes with normal activities (e.g., school, work).    - SEVERE: Unable to walk without falling, or requires assistance to walk without falling; feels like passing out now.       Moderate. Unsteady on feet; felt like was going to fall over.   5. ONSET:  \"When did the dizziness begin?\"      About 1 hour ago. 12:30 pm.   6. AGGRAVATING FACTORS: \"Does anything make it worse?\" (e.g., standing, change in head position)      Feels abnormal but able to walk, etc.   7. HEART RATE: \"Can you tell me your heart rate?\" \"How many beats in 15 seconds?\"  (Note: not all patients can do this)        Regular.   8. CAUSE: \"What do you think is causing the dizziness?\"      Patient now thinking maybe a stroke or TIA.   9. RECURRENT SYMPTOM: \"Have you had dizziness before?\" If Yes, ask: \"When was the last time?\" \"What happened that time?\"      No.   10. OTHER SYMPTOMS: \"Do you have any " "other symptoms?\" (e.g., fever, chest pain, vomiting, diarrhea, bleeding)        No.     Additional Information   Negative: SEVERE difficulty breathing (e.g., struggling for each breath, speaks in single words)   Negative: Shock suspected (e.g., cold/pale/clammy skin, too weak to stand, low BP, rapid pulse)   Negative: Difficult to awaken or acting confused (e.g., disoriented, slurred speech)   Negative: Fainted, and still feels dizzy afterwards   Negative: Overdose (accidental or intentional) of medications   Negative: New neurologic deficit that is present now: * Weakness of the face, arm, or leg on one side of the body * Numbness of the face, arm, or leg on one side of the body * Loss of speech or garbled speech   Negative: Heart beating < 50 beats per minute OR > 140 beats per minute   Negative: Sounds like a life-threatening emergency to the triager   Negative: Chest pain   Negative: Rectal bleeding, bloody stool, or tarry-black stool   Negative: Vomiting is main symptom   Negative: Diarrhea is main symptom   Negative: Headache is main symptom   Negative: Heat exhaustion suspected (i.e., dehydration from heat exposure)   Negative: Patient states that they are having an anxiety or panic attack   Negative: Dizziness from low blood sugar (i.e., < 60 mg/dl or 3.5 mmol/l)   Negative: SEVERE dizziness (e.g., unable to stand, requires support to walk, feels like passing out now)   Negative: SEVERE headache or neck pain   Negative: Spinning or tilting sensation (vertigo) present now and one or more stroke risk factors (i.e., hypertension, diabetes mellitus, prior stroke/TIA, heart attack, age over 60) (Exception: prior physician evaluation for this AND no different/worse than usual)   Negative: Neurologic deficit that was brief (now gone), ANY of the following:* Weakness of the face, arm, or leg on one side of the body* Numbness of the face, arm, or leg on one side of the body* Loss of speech or garbled speech   " Negative: Loss of vision or double vision  (Exception: Similar to previous migraines.)   Negative: Extra heart beats OR irregular heart beating (i.e., 'palpitations')   Negative: Difficulty breathing   Negative: Drinking very little and has signs of dehydration (e.g., no urine > 12 hours, very dry mouth, very lightheaded)   Negative: Follows bleeding (e.g., stomach, rectum, vagina)  (Exception: Became dizzy from sight of small amount blood.)   Negative: Patient sounds very sick or weak to the triager   Negative: Lightheadedness (dizziness) present now, after 2 hours of rest and fluids   Negative: Spinning or tilting sensation (vertigo) present now   Negative: Fever > 103 F (39.4 C)   Negative: Fever > 100.0 F (37.8 C) and has diabetes mellitus or a weak immune system (e.g., HIV positive, cancer chemotherapy, organ transplant, splenectomy, chronic steroids)    Protocols used: Dizziness-A-OH    NANY VitalN JOVON  [facility]

## 2023-09-27 ENCOUNTER — OFFICE VISIT (OUTPATIENT)
Dept: FAMILY MEDICINE | Facility: CLINIC | Age: 73
End: 2023-09-27
Payer: COMMERCIAL

## 2023-09-27 VITALS
HEIGHT: 72 IN | HEART RATE: 50 BPM | BODY MASS INDEX: 23.43 KG/M2 | WEIGHT: 173 LBS | TEMPERATURE: 97 F | SYSTOLIC BLOOD PRESSURE: 113 MMHG | OXYGEN SATURATION: 98 % | DIASTOLIC BLOOD PRESSURE: 64 MMHG | RESPIRATION RATE: 16 BRPM

## 2023-09-27 DIAGNOSIS — E53.8 VITAMIN B12 DEFICIENCY (NON ANEMIC): ICD-10-CM

## 2023-09-27 DIAGNOSIS — M25.559 PAIN IN JOINT INVOLVING PELVIC REGION AND THIGH, UNSPECIFIED LATERALITY: ICD-10-CM

## 2023-09-27 DIAGNOSIS — F32.1 CURRENT MODERATE EPISODE OF MAJOR DEPRESSIVE DISORDER WITHOUT PRIOR EPISODE (H): ICD-10-CM

## 2023-09-27 DIAGNOSIS — Z13.220 SCREENING FOR HYPERLIPIDEMIA: ICD-10-CM

## 2023-09-27 DIAGNOSIS — Z13.1 SCREENING FOR DIABETES MELLITUS: ICD-10-CM

## 2023-09-27 DIAGNOSIS — Z00.00 ENCOUNTER FOR MEDICARE ANNUAL WELLNESS EXAM: Primary | ICD-10-CM

## 2023-09-27 LAB
ALBUMIN SERPL BCG-MCNC: 4 G/DL (ref 3.5–5.2)
ALP SERPL-CCNC: 56 U/L (ref 40–129)
ALT SERPL W P-5'-P-CCNC: 9 U/L (ref 0–70)
ANION GAP SERPL CALCULATED.3IONS-SCNC: 8 MMOL/L (ref 7–15)
AST SERPL W P-5'-P-CCNC: 15 U/L (ref 0–45)
BILIRUB SERPL-MCNC: 0.5 MG/DL
BUN SERPL-MCNC: 15.1 MG/DL (ref 8–23)
CALCIUM SERPL-MCNC: 9 MG/DL (ref 8.8–10.2)
CHLORIDE SERPL-SCNC: 104 MMOL/L (ref 98–107)
CHOLEST SERPL-MCNC: 161 MG/DL
CREAT SERPL-MCNC: 1.11 MG/DL (ref 0.67–1.17)
DEPRECATED HCO3 PLAS-SCNC: 27 MMOL/L (ref 22–29)
EGFRCR SERPLBLD CKD-EPI 2021: 70 ML/MIN/1.73M2
ERYTHROCYTE [DISTWIDTH] IN BLOOD BY AUTOMATED COUNT: 12.7 % (ref 10–15)
GLUCOSE SERPL-MCNC: 96 MG/DL (ref 70–99)
HCT VFR BLD AUTO: 40.4 % (ref 40–53)
HDLC SERPL-MCNC: 65 MG/DL
HGB BLD-MCNC: 13.4 G/DL (ref 13.3–17.7)
HOLD SPECIMEN: NORMAL
LDLC SERPL CALC-MCNC: 87 MG/DL
MCH RBC QN AUTO: 31.7 PG (ref 26.5–33)
MCHC RBC AUTO-ENTMCNC: 33.2 G/DL (ref 31.5–36.5)
MCV RBC AUTO: 96 FL (ref 78–100)
NONHDLC SERPL-MCNC: 96 MG/DL
PLATELET # BLD AUTO: 281 10E3/UL (ref 150–450)
POTASSIUM SERPL-SCNC: 4.6 MMOL/L (ref 3.4–5.3)
PROT SERPL-MCNC: 6.1 G/DL (ref 6.4–8.3)
RBC # BLD AUTO: 4.23 10E6/UL (ref 4.4–5.9)
SODIUM SERPL-SCNC: 139 MMOL/L (ref 135–145)
TRIGL SERPL-MCNC: 47 MG/DL
VIT B12 SERPL-MCNC: 935 PG/ML (ref 232–1245)
WBC # BLD AUTO: 5.1 10E3/UL (ref 4–11)

## 2023-09-27 PROCEDURE — 85027 COMPLETE CBC AUTOMATED: CPT | Performed by: FAMILY MEDICINE

## 2023-09-27 PROCEDURE — 80061 LIPID PANEL: CPT | Performed by: FAMILY MEDICINE

## 2023-09-27 PROCEDURE — 90480 ADMN SARSCOV2 VAC 1/ONLY CMP: CPT | Performed by: FAMILY MEDICINE

## 2023-09-27 PROCEDURE — 82607 VITAMIN B-12: CPT | Performed by: FAMILY MEDICINE

## 2023-09-27 PROCEDURE — 99214 OFFICE O/P EST MOD 30 MIN: CPT | Mod: 25 | Performed by: FAMILY MEDICINE

## 2023-09-27 PROCEDURE — 91320 SARSCV2 VAC 30MCG TRS-SUC IM: CPT | Performed by: FAMILY MEDICINE

## 2023-09-27 PROCEDURE — 36415 COLL VENOUS BLD VENIPUNCTURE: CPT | Performed by: FAMILY MEDICINE

## 2023-09-27 PROCEDURE — 90662 IIV NO PRSV INCREASED AG IM: CPT | Performed by: FAMILY MEDICINE

## 2023-09-27 PROCEDURE — 80053 COMPREHEN METABOLIC PANEL: CPT | Performed by: FAMILY MEDICINE

## 2023-09-27 PROCEDURE — G0008 ADMIN INFLUENZA VIRUS VAC: HCPCS | Performed by: FAMILY MEDICINE

## 2023-09-27 PROCEDURE — G0439 PPPS, SUBSEQ VISIT: HCPCS | Performed by: FAMILY MEDICINE

## 2023-09-27 RX ORDER — BUPROPION HYDROCHLORIDE 300 MG/1
300 TABLET ORAL EVERY MORNING
Qty: 90 TABLET | Refills: 3 | Status: SHIPPED | OUTPATIENT
Start: 2023-09-27 | End: 2024-09-16

## 2023-09-27 RX ORDER — BUPROPION HYDROCHLORIDE 150 MG/1
150 TABLET ORAL EVERY MORNING
Qty: 90 TABLET | Refills: 3 | Status: SHIPPED | OUTPATIENT
Start: 2023-09-27 | End: 2024-09-16

## 2023-09-27 RX ORDER — GABAPENTIN 300 MG/1
600 CAPSULE ORAL EVERY EVENING
Qty: 180 CAPSULE | Refills: 1 | Status: SHIPPED | OUTPATIENT
Start: 2023-09-27 | End: 2024-03-25

## 2023-09-27 ASSESSMENT — ENCOUNTER SYMPTOMS
WEAKNESS: 0
HEMATOCHEZIA: 0
HEMATURIA: 0
SORE THROAT: 1
HEARTBURN: 0
DIARRHEA: 0
DYSURIA: 0
SHORTNESS OF BREATH: 0
NERVOUS/ANXIOUS: 0
CHILLS: 0
EYE PAIN: 0
ABDOMINAL PAIN: 0
CONSTIPATION: 0
PALPITATIONS: 0
NAUSEA: 0
PARESTHESIAS: 0
FREQUENCY: 0
COUGH: 0
MYALGIAS: 0
DIZZINESS: 0
HEADACHES: 0
JOINT SWELLING: 0
ARTHRALGIAS: 0
FEVER: 0

## 2023-09-27 ASSESSMENT — PATIENT HEALTH QUESTIONNAIRE - PHQ9
SUM OF ALL RESPONSES TO PHQ QUESTIONS 1-9: 2
SUM OF ALL RESPONSES TO PHQ QUESTIONS 1-9: 2
10. IF YOU CHECKED OFF ANY PROBLEMS, HOW DIFFICULT HAVE THESE PROBLEMS MADE IT FOR YOU TO DO YOUR WORK, TAKE CARE OF THINGS AT HOME, OR GET ALONG WITH OTHER PEOPLE: NOT DIFFICULT AT ALL

## 2023-09-27 ASSESSMENT — PAIN SCALES - GENERAL: PAINLEVEL: NO PAIN (0)

## 2023-09-27 ASSESSMENT — ACTIVITIES OF DAILY LIVING (ADL): CURRENT_FUNCTION: NO ASSISTANCE NEEDED
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
## 2023-09-27 NOTE — PATIENT INSTRUCTIONS
Patient Education   Personalized Prevention Plan  You are due for the preventive services outlined below.  Your care team is available to assist you in scheduling these services.  If you have already completed any of these items, please share that information with your care team to update in your medical record.  Health Maintenance Due   Topic Date Due     Diptheria Tetanus Pertussis (DTAP/TDAP/TD) Vaccine (2 - Td or Tdap) 06/05/2023     Flu Vaccine (1) 09/01/2023     Annual Wellness Visit  09/26/2023     Hearing Loss: Care Instructions  Overview     Hearing loss is a sudden or slow decrease in how well you hear. It can range from slight to profound. Permanent hearing loss can occur with aging. It also can happen when you are exposed long-term to loud noise. Examples include listening to loud music, riding motorcycles, or being around other loud machines.  Hearing loss can affect your work and home life. It can make you feel lonely or depressed. You may feel that you have lost your independence. But hearing aids and other devices can help you hear better and feel connected to others.  Follow-up care is a key part of your treatment and safety. Be sure to make and go to all appointments, and call your doctor if you are having problems. It's also a good idea to know your test results and keep a list of the medicines you take.  How can you care for yourself at home?  Avoid loud noises whenever possible. This helps keep your hearing from getting worse.  Always wear hearing protection around loud noises.  Wear a hearing aid as directed.  A professional can help you pick a hearing aid that will work best for you.  You can also get hearing aids over the counter for mild to moderate hearing loss.  Have hearing tests as your doctor suggests. They can show whether your hearing has changed. Your hearing aid may need to be adjusted.  Use other devices as needed. These may include:  Telephone amplifiers and hearing aids that can  "connect to a television, stereo, radio, or microphone.  Devices that use lights or vibrations. These alert you to the doorbell, a ringing telephone, or a baby monitor.  Television closed-captioning. This shows the words at the bottom of the screen. Most new TVs can do this.  TTY (text telephone). This lets you type messages back and forth on the telephone instead of talking or listening. These devices are also called TDD. When messages are typed on the keyboard, they are sent over the phone line to a receiving TTY. The message is shown on a monitor.  Use text messaging, social media, and email if it is hard for you to communicate by telephone.  Try to learn a listening technique called speechreading. It is not lipreading. You pay attention to people's gestures, expressions, posture, and tone of voice. These clues can help you understand what a person is saying. Face the person you are talking to, and have them face you. Make sure the lighting is good. You need to see the other person's face clearly.  Think about counseling if you need help to adjust to your hearing loss.  When should you call for help?  Watch closely for changes in your health, and be sure to contact your doctor if:    You think your hearing is getting worse.     You have new symptoms, such as dizziness or nausea.   Where can you learn more?  Go to https://www.[employer].net/patiented  Enter R798 in the search box to learn more about \"Hearing Loss: Care Instructions.\"  Current as of: March 1, 2023               Content Version: 13.7    4104-2277 [employer].   Care instructions adapted under license by your healthcare professional. If you have questions about a medical condition or this instruction, always ask your healthcare professional. [employer] disclaims any warranty or liability for your use of this information.      Bladder Training: Care Instructions  Your Care Instructions     Bladder training is used to treat " urge incontinence and stress incontinence. Urge incontinence means that the need to urinate comes on so fast that you can't get to a toilet in time. Stress incontinence means that you leak urine because of pressure on your bladder. For example, it may happen when you laugh, cough, or lift something heavy.  Bladder training can increase how long you can wait before you have to urinate. It can also help your bladder hold more urine. And it can give you better control over the urge to urinate.  It is important to remember that bladder training takes a few weeks to a few months to make a difference. You may not see results right away, but don't give up.  Follow-up care is a key part of your treatment and safety. Be sure to make and go to all appointments, and call your doctor if you are having problems. It's also a good idea to know your test results and keep a list of the medicines you take.  How can you care for yourself at home?  Work with your doctor to come up with a bladder training program that is right for you. You may use one or more of the following methods.  Delayed urination  In the beginning, try to keep from urinating for 5 minutes after you first feel the need to go.  While you wait, take deep, slow breaths to relax. Kegel exercises can also help you delay the need to go to the bathroom.  After some practice, when you can easily wait 5 minutes to urinate, try to wait 10 minutes before you urinate.  Slowly increase the waiting period until you are able to control when you have to urinate.  Scheduled urination  Empty your bladder when you first wake up in the morning.  Schedule times throughout the day when you will urinate.  Start by going to the bathroom every hour, even if you don't need to go.  Slowly increase the time between trips to the bathroom.  When you have found a schedule that works well for you, keep doing it.  If you wake up during the night and have to urinate, do it. Apply your schedule to  "waking hours only.  Kegel exercises  These tighten and strengthen pelvic muscles, which can help you control the flow of urine. (If doing these exercises causes pain, stop doing them and talk with your doctor.) To do Kegel exercises:  Squeeze your muscles as if you were trying not to pass gas. Or squeeze your muscles as if you were stopping the flow of urine. Your belly, legs, and buttocks shouldn't move.  Hold the squeeze for 3 seconds, then relax for 5 to 10 seconds.  Start with 3 seconds, then add 1 second each week until you are able to squeeze for 10 seconds.  Repeat the exercise 10 times a session. Do 3 to 8 sessions a day.  When should you call for help?  Watch closely for changes in your health, and be sure to contact your doctor if:    Your incontinence is getting worse.     You do not get better as expected.   Where can you learn more?  Go to https://www.[employer].net.[employer]/patiented  Enter V684 in the search box to learn more about \"Bladder Training: Care Instructions.\"  Current as of: March 1, 2023               Content Version: 13.7    7766-2387 [employer].   Care instructions adapted under license by your healthcare professional. If you have questions about a medical condition or this instruction, always ask your healthcare professional. [employer] disclaims any warranty or liability for your use of this information.      Preventing Falls: Care Instructions    Talk to your doctor about the medicines you take. Ask if any of them increase the risk of falls and whether they can be changed or stopped.   Try to exercise regularly. It can help improve your strength and balance. This can help lower your risk of falling.     Practice fall safety and prevention.    Wear low-heeled shoes that fit well and give your feet good support. Talk to your doctor if you have foot problems that make this hard.  Carry a cellphone or wear a medical alert device that you can use to call for help.  Use " "stepladders instead of chairs to reach high objects. Don't climb if you're at risk for falls. Ask for help, if needed.  Wear the correct eyeglasses, if you need them.    Make your home safer.    Remove rugs, cords, clutter, and furniture from walkways.  Keep your house well lit. Use night-lights in hallways and bathrooms.  Install and use sturdy handrails on stairways.  Wear nonskid footwear, even inside. Don't walk barefoot or in socks without shoes.    Be safe outside.    Use handrails, curb cuts, and ramps whenever possible.  Keep your hands free by using a shoulder bag or backpack.  Try to walk in well-lit areas. Watch out for uneven ground, changes in pavement, and debris.  Be careful in the winter. Walk on the grass or gravel when sidewalks are slippery. Use de-icer on steps and walkways. Add non-slip devices to shoes.    Put grab bars and nonskid mats in your shower or tub and near the toilet. Try to use a shower chair or bath bench when bathing.   Get into a tub or shower by putting in your weaker leg first. Get out with your strong side first. Have a phone or medical alert device in the bathroom with you.   Where can you learn more?  Go to https://www.[employer].net/patiented  Enter G117 in the search box to learn more about \"Preventing Falls: Care Instructions.\"  Current as of: November 9, 2022               Content Version: 13.7    1703-3341 [employer].   Care instructions adapted under license by your healthcare professional. If you have questions about a medical condition or this instruction, always ask your healthcare professional. [employer] disclaims any warranty or liability for your use of this information.      How to Get Up Safely After a Fall: Care Instructions  Overview     If you have injuries, health problems, or other reasons that may make it easy for you to fall at home, it is a good idea to learn how to get up safely after a fall. Learning how to get up " correctly can help you avoid making an injury worse.  Also, knowing what to do if you cannot get up can help you stay safe until help arrives.  Follow-up care is a key part of your treatment and safety. Be sure to make and go to all appointments, and call your doctor if you are having problems. It's also a good idea to know your test results and keep a list of the medicines you take.  How can you care for yourself after a fall?  If you think you can get up  First lie still for a few minutes and think about how you feel. If your body feels okay and you think you can get up safely, follow the rest of the steps below:  Look for a chair or other piece of furniture that is close to you.  Roll onto your side and rest. Roll by turning your head in the direction you want to roll, move your shoulder and arm, then hip and leg in the same direction.  Lie still for a moment to let your blood pressure adjust.  Slowly push your upper body up, lift your head, and take a moment to rest.  Slowly get up on your hands and knees, and crawl to the chair or other stable piece of furniture.  Put your hands on the chair.  Move one foot forward, and place it flat on the floor. Your other leg should be bent with the knee on the floor.  Rise slowly, turn your body, and sit in the chair. Stay seated for a bit and think about how you feel. Call for help. Even if you feel okay, let someone know what happened to you. You might not know that you have a serious injury.  If you cannot get up  If you think you are injured after a fall or you cannot get up, try not to panic.  Call out for help.  If you have a phone within reach or you have an emergency call device, use it to call for help.  If you do not have a phone within reach, try to slide yourself toward it. If you cannot get to the phone, try to slide toward a door or window or a place where you think you can be heard.  [place] or use an object to make noise so someone might hear you.  If you can  "reach something that you can use for a pillow, place it under your head. Try to stay warm by covering yourself with a blanket or clothing while you wait for help.  When should you call for help?   Call 911 anytime you think you may need emergency care. For example, call if:    You passed out (lost consciousness).     You cannot get up after a fall.     You have severe pain.   Call your doctor now or seek immediate medical care if:    You have new or worse pain.     You are dizzy or lightheaded.     You hit your head.   Watch closely for changes in your health, and be sure to contact your doctor if:    You do not get better as expected.   Where can you learn more?  Go to https://www.[employer].net/patiented  Enter G513 in the search box to learn more about \"How to Get Up Safely After a Fall: Care Instructions.\"  Current as of: November 14, 2022               Content Version: 13.7    1811-7846 [employer].   Care instructions adapted under license by your healthcare professional. If you have questions about a medical condition or this instruction, always ask your healthcare professional. [employer] disclaims any warranty or liability for your use of this information.         "

## 2023-10-17 ENCOUNTER — MYC MEDICAL ADVICE (OUTPATIENT)
Dept: FAMILY MEDICINE | Facility: CLINIC | Age: 73
End: 2023-10-17
Payer: COMMERCIAL

## 2023-10-17 DIAGNOSIS — R25.9 ABNORMAL INVOLUNTARY MOVEMENT: Primary | ICD-10-CM

## 2023-10-17 NOTE — TELEPHONE ENCOUNTER
Writer responded via [employer].  NANY CaalN, RN-BC  [facility]

## 2023-10-17 NOTE — TELEPHONE ENCOUNTER
I have signed neuro referral for him.  He should try to get all of his records from [place] and if having recurrent episodes - he should not travel and should go to local ER. Avoid driving and operating machinery meanwhile.

## 2023-11-06 ENCOUNTER — ANCILLARY PROCEDURE (OUTPATIENT)
Dept: NEUROLOGY | Facility: CLINIC | Age: 73
End: 2023-11-06
Attending: PSYCHIATRY & NEUROLOGY
Payer: COMMERCIAL

## 2023-11-06 DIAGNOSIS — G40.909 SEIZURE DISORDER (H): ICD-10-CM

## 2023-11-28 ENCOUNTER — OFFICE VISIT (OUTPATIENT)
Dept: NEUROLOGY | Facility: CLINIC | Age: 73
End: 2023-11-28
Attending: FAMILY MEDICINE
Payer: COMMERCIAL

## 2023-11-28 VITALS
SYSTOLIC BLOOD PRESSURE: 111 MMHG | WEIGHT: 187.4 LBS | HEIGHT: 72 IN | HEART RATE: 53 BPM | DIASTOLIC BLOOD PRESSURE: 67 MMHG | TEMPERATURE: 97 F | BODY MASS INDEX: 25.38 KG/M2

## 2023-11-28 DIAGNOSIS — M25.559 PAIN IN JOINT INVOLVING PELVIC REGION AND THIGH, UNSPECIFIED LATERALITY: ICD-10-CM

## 2023-11-28 DIAGNOSIS — R25.9 ABNORMAL INVOLUNTARY MOVEMENT: ICD-10-CM

## 2023-11-28 DIAGNOSIS — G40.909 SEIZURE DISORDER (H): Primary | ICD-10-CM

## 2023-11-28 RX ORDER — GABAPENTIN 300 MG/1
600 CAPSULE ORAL EVERY EVENING
Qty: 180 CAPSULE | Refills: 1 | Status: CANCELLED | OUTPATIENT
Start: 2023-11-28

## 2023-11-28 NOTE — LETTER
2023       RE: Neptali Moore  : 1950   MRN: [id number]      Dear Colleague,    Thank you for referring your patient, Neptali Moore, to the [facility] at [facility]. Please see a copy of my visit note below.     clinic visit  Mine is a 73 year old right-handed man who recently had some events that may have been seizures.     History during 2023 while working in his Home [employer] he suddenly had a sudden onset of a very bad odor. It was hard to describe but it finally came up with burning rubber. It lasted approximately 4 to 5 minutes. At this time he also felt fuzzy. He did remember everything that was happening. No one observed this event. He did not have any abdominal rising sensation or any other symptoms. He called the fire department to determine if there was some kind of hidden fire, but none was detected. The second event occurred in October. His partner had opened a jar a face cream and he suddenly began to smell a very bad odor similar to the one he had smelled in September. His partner however did not smell anything. She did notice some jerking of his legs more like shaking lightly. He was fully aware of this event which again lasted a few minutes and it was accompanied by fuzziness. Later the same day he was helping his brother outdoors again we had a similar event. He went to a local emergency room where the differential diagnosis include a stroke or seizure. MRI scan was negative. Physical examination was negative. He was discharged home and he has had no further events.   Risk factors: birth history indicates his weight was approximately 8 lbs, no difficulties at birth and mother had no illnesses. No febrile convulsions. Minor concussion in school where he recovered consciousness with no known after effects. No history of drug abuse or heavy alcohol use.  Family history negative for  epilepsy  review of systems: negative for stroke, heart disease, high blood pressure, diabetes. Has bradycardia.  MRI scan report describes minimal small vessel disease  EEG for three hours with extensive sleep is within normal limits for age.  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  Physical examination: he is alert, slender, relaxed.  Speech is fluent but he does have some word finding difficulty during normal conversation. He recognizes that a $3 bill is fake recognizes former president but does not recall the name and is able to describe the humor.  Visual fields are full to confrontation, pupils are equal, eye movements are full without nystagmus. Facial movements are normal. Hearing is intact. Tongue and palate move normally.  Cerebellar testing: there is minor dysmetria on finger two nose testing. Tandem gate reveals mild ataxia, and this is apparent on walking down the stiles.  Balance is normal  motor tone and strength is normal.     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 With his history of mild word finding difficulty and mild memory issues, the left would be most likely. However the MRI and EG do not show any mesial temporal sclerosis or epileptiform activity. Paragraph  He has been on gabapentin for over a year for a painful shoulder and is comfortable continuing to use this at a higher dose for seizures. In fact, he has already increased the dose to 300 milligrams three times a day.  2) the cerebellar findings were a surprise and suggest a mild underlying cerebellar dysfunction. The cause of this is undetermined.     Plan  1)    continue gabapentin 300 milligrams TID. Increase to 600 HS if another seizure occurs. If this is not enough to prevent another seizure increase to 900 HS  as he has found morning doses of gabapentin make him sleepy and the half life of gabapentin would be sufficient to carry him through the day with a higher nighttime dose  2)    neuropsychological testing was ordered  3)    return to clinic in person in three months or sooner if problems arise  4)    because these are focal aware he is able to drive at the present time      Time: pre visit review of ER visit, MRI and EEG and EKG 6 min, face to face 44 min, post vist 5 min charting.          Again, thank you for allowing me to participate in the care of your patient.      Sincerely,    Sergio Zurita MD

## 2023-11-29 NOTE — PROGRESS NOTES
clinic visit November 28 Mine is a 73 year old right-handed man who recently had some events that may have been seizures.     History during September 2023 while working in his Home [employer] he suddenly had a sudden onset of a very bad odor. It was hard to describe but it finally came up with burning rubber. It lasted approximately 4 to 5 minutes. At this time he also felt fuzzy. He did remember everything that was happening. No one observed this event. He did not have any abdominal rising sensation or any other symptoms. He called the fire department to determine if there was some kind of hidden fire, but none was detected. The second event occurred in October. His partner had opened a jar a face cream and he suddenly began to smell a very bad odor similar to the one he had smelled in September. His partner however did not smell anything. She did notice some jerking of his legs more like shaking lightly. He was fully aware of this event which again lasted a few minutes and it was accompanied by fuzziness. Later the same day he was helping his brother outdoors again we had a similar event. He went to a local emergency room where the differential diagnosis include a stroke or seizure. MRI scan was negative. Physical examination was negative. He was discharged home and he has had no further events.   Risk factors: birth history indicates his weight was approximately 8 lbs, no difficulties at birth and mother had no illnesses. No febrile convulsions. Minor concussion in school where he recovered consciousness with no known after effects. No history of drug abuse or heavy alcohol use.  Family history negative for epilepsy  review of systems: negative for stroke, heart disease, high blood pressure, diabetes. Has bradycardia.  MRI scan report describes minimal small vessel disease  EEG for three hours with extensive sleep is within normal limits for age.  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  Physical examination: he is alert, slender, relaxed.  Speech is fluent but he does have some word finding difficulty during normal conversation. He recognizes that a $3 bill is fake recognizes former president but does not recall the name and is able to describe the humor.  Visual fields are full to confrontation, pupils are equal, eye movements are full without nystagmus. Facial movements are normal. Hearing is intact. Tongue and palate move normally.  Cerebellar testing: there is minor dysmetria on finger two nose testing. Tandem gate reveals mild ataxia, and this is apparent on walking down the stiles.  Balance is normal  motor tone and strength is normal.     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 With his history of mild word finding difficulty and mild memory issues, the left would be most likely. However the MRI and EG do not show any mesial temporal sclerosis or epileptiform activity. Paragraph  He has been on gabapentin for over a year for a painful shoulder and is comfortable continuing to use this at a higher dose for seizures. In fact, he has already increased the dose to 300 milligrams three times a day.  2) the cerebellar findings were a surprise and suggest a mild underlying cerebellar dysfunction. The cause of this is undetermined.     Plan  1)    continue gabapentin 300 milligrams TID. Increase to 600 HS if another seizure occurs. If this is not enough to prevent another seizure increase to 900 HS as he has found morning doses of gabapentin make him sleepy and the half life of gabapentin would be sufficient to carry him through the day with a higher nighttime dose  2)    neuropsychological testing was ordered  3)    return to clinic in person in three months or sooner if problems  arise  4)    because these are focal aware he is able to drive at the present time      Time: pre visit review of ER visit, MRI and EEG and EKG 6 min, face to face 44 min, post vist 5 min charting.    Sergio Zurita MD

## 2024-03-05 ENCOUNTER — OFFICE VISIT (OUTPATIENT)
Dept: NEUROLOGY | Facility: CLINIC | Age: 74
End: 2024-03-05
Payer: COMMERCIAL

## 2024-03-05 VITALS
WEIGHT: 183 LBS | SYSTOLIC BLOOD PRESSURE: 127 MMHG | DIASTOLIC BLOOD PRESSURE: 71 MMHG | HEART RATE: 52 BPM | TEMPERATURE: 97.3 F | BODY MASS INDEX: 24.79 KG/M2 | HEIGHT: 72 IN

## 2024-03-05 DIAGNOSIS — G40.009 PARTIAL IDIOPATHIC EPILEPSY WITH SEIZURES OF LOCALIZED ONSET, NOT INTRACTABLE, WITHOUT STATUS EPILEPTICUS (H): Primary | ICD-10-CM

## 2024-03-05 NOTE — PROGRESS NOTES
Interval Hx. Taking gabapentin 300 mg tid for pain, Uses pillbox but misses some doses. No more events.     History reviewed 3/5/24 during September 2023 while working in his Home [employer] he suddenly had a sudden onset of a very bad odor. It was hard to describe but it finally came up with burning rubber. It lasted approximately 4 to 5 minutes. At this time he also felt fuzzy. He did remember everything that was happening. No one observed this event. He did not have any abdominal rising sensation or any other symptoms. He called the fire department to determine if there was some kind of hidden fire, but none was detected. The second event occurred in October. His partner had opened a jar a face cream and he suddenly began to smell a very bad odor similar to the one he had smelled in September. His partner however did not smell anything. She did notice some jerking of his legs more like shaking lightly. He was fully aware of this event which again lasted a few minutes and it was accompanied by fuzziness. Later the same day he was helping his brother outdoors again we had a similar event. He went to a local emergency room where the differential diagnosis include a stroke or seizure. MRI scan was negative. Physical examination was negative. He was discharged home and he has had no further events.   Risk factors: birth history indicates his weight was approximately 8 lbs, no difficulties at birth and mother had no illnesses. No febrile convulsions. Minor concussion in school where he recovered consciousness with no known after effects. No history of drug abuse or heavy alcohol use.  Family history negative for epilepsy  review of systems: negative for stroke, heart disease, high blood pressure, diabetes. Has bradycardia.  MRI scan report describes minimal small vessel disease  EEG for three hours with extensive sleep is within normal limits for age.  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  Physical examination: he is alert, slender, relaxed.  Speech is fluent but he does have some word finding difficulty during normal conversation. He recognizes that a $3 bill is fake recognizes former president but does not recall the name and is able to describe the humor.  Visual fields are full to confrontation, pupils are equal, eye movements are full without nystagmus. Facial movements are normal. Hearing is intact. Tongue and palate move normally.  Cerebellar testing: there is minor dysmetria on finger two nose testing. Tandem gate reveals mild ataxia, and this is apparent on walking down the stiles.  Balance is normal  motor tone and strength is normal.     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 With his history of mild word finding difficulty and mild memory issues, the left would be most likely. However the MRI and EG do not show any mesial temporal sclerosis or epileptiform activity. Paragraph  He has been on gabapentin for over a year for a painful shoulder and is comfortable continuing to use this at a higher dose for seizures. In fact, he has already increased the dose to 300 milligrams three times a day.  2) the cerebellar findings were a surprise and suggest a mild underlying cerebellar dysfunction. The cause of this is undetermined.     Plan  1)    continue gabapentin 300 milligrams TID. Increase to 600 HS if another seizure occurs. If this is not enough to prevent another seizure increase to 900 HS as he has found morning doses of gabapentin make him sleepy and the half life of gabapentin would be sufficient to carry him through the day with a higher nighttime dose  2)    neuropsychological testing was ordered due inm June due to backlog  3)     RFTC after n psych tesing  4)    because these are  focal aware he is able to drive at the present time      Time: pre visit  4min, face to face 28  min, post vist 3 min charting.    Sergio Zurita MD

## 2024-03-05 NOTE — LETTER
3/5/2024       RE: Neptali Moore  : 1950   MRN: [id number]        Dear Colleague,    Thank you for referring your patient, Neptali Moore, to the [facility] at [facility]. Please see a copy of my visit note below.    Interval Hx. Taking gabapentin 300 mg tid for pain, Uses pillbox but misses some doses. No more events.     History reviewed 3/5/24 during 2023 while working in his Home [employer] he suddenly had a sudden onset of a very bad odor. It was hard to describe but it finally came up with burning rubber. It lasted approximately 4 to 5 minutes. At this time he also felt fuzzy. He did remember everything that was happening. No one observed this event. He did not have any abdominal rising sensation or any other symptoms. He called the fire department to determine if there was some kind of hidden fire, but none was detected. The second event occurred in October. His partner had opened a jar a face cream and he suddenly began to smell a very bad odor similar to the one he had smelled in September. His partner however did not smell anything. She did notice some jerking of his legs more like shaking lightly. He was fully aware of this event which again lasted a few minutes and it was accompanied by fuzziness. Later the same day he was helping his brother outdoors again we had a similar event. He went to a local emergency room where the differential diagnosis include a stroke or seizure. MRI scan was negative. Physical examination was negative. He was discharged home and he has had no further events.   Risk factors: birth history indicates his weight was approximately 8 lbs, no difficulties at birth and mother had no illnesses. No febrile convulsions. Minor concussion in school where he recovered consciousness with no known after effects. No history of drug abuse or heavy alcohol use.  Family history negative for epilepsy  review  of systems: negative for stroke, heart disease, high blood pressure, diabetes. Has bradycardia.  MRI scan report describes minimal small vessel disease  EEG for three hours with extensive sleep is within normal limits for age.  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  Physical examination: he is alert, slender, relaxed.  Speech is fluent but he does have some word finding difficulty during normal conversation. He recognizes that a $3 bill is fake recognizes former president but does not recall the name and is able to describe the humor.  Visual fields are full to confrontation, pupils are equal, eye movements are full without nystagmus. Facial movements are normal. Hearing is intact. Tongue and palate move normally.  Cerebellar testing: there is minor dysmetria on finger two nose testing. Tandem gate reveals mild ataxia, and this is apparent on walking down the stiles.  Balance is normal  motor tone and strength is normal.     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 With his history of mild word finding difficulty and mild memory issues, the left would be most likely. However the MRI and EG do not show any mesial temporal sclerosis or epileptiform activity. Paragraph  He has been on gabapentin for over a year for a painful shoulder and is comfortable continuing to use this at a higher dose for seizures. In fact, he has already increased the dose to 300 milligrams three times a day.  2) the cerebellar findings were a surprise and suggest a mild underlying cerebellar dysfunction. The cause of this is undetermined.     Plan  1)    continue gabapentin 300 milligrams TID. Increase to 600 HS if another seizure occurs. If this is not enough to prevent another seizure increase to 900 HS as he has found  morning doses of gabapentin make him sleepy and the half life of gabapentin would be sufficient to carry him through the day with a higher nighttime dose  2)    neuropsychological testing was ordered due inm June due to backlog  3)     RFTC after n psych tesing  4)    because these are focal aware he is able to drive at the present time      Time: pre visit  4min, face to face 28  min, post vist 3 min charting.        Again, thank you for allowing me to participate in the care of your patient.      Sincerely,    Sergio Zurita MD

## 2024-03-23 DIAGNOSIS — M25.559 PAIN IN JOINT INVOLVING PELVIC REGION AND THIGH, UNSPECIFIED LATERALITY: ICD-10-CM

## 2024-03-25 RX ORDER — GABAPENTIN 300 MG/1
300 CAPSULE ORAL 3 TIMES DAILY
Qty: 270 CAPSULE | Refills: 3 | Status: SHIPPED | OUTPATIENT
Start: 2024-03-25 | End: 2024-04-15

## 2024-04-11 ENCOUNTER — TRANSFERRED RECORDS (OUTPATIENT)
Dept: HEALTH INFORMATION MANAGEMENT | Facility: CLINIC | Age: 74
End: 2024-04-11
Payer: COMMERCIAL

## 2024-04-15 DIAGNOSIS — M25.559 PAIN IN JOINT INVOLVING PELVIC REGION AND THIGH, UNSPECIFIED LATERALITY: ICD-10-CM

## 2024-04-15 RX ORDER — GABAPENTIN 300 MG/1
CAPSULE ORAL
Qty: 180 CAPSULE | Refills: 3 | Status: SHIPPED | OUTPATIENT
Start: 2024-04-15 | End: 2024-05-28

## 2024-05-13 ENCOUNTER — MYC MEDICAL ADVICE (OUTPATIENT)
Dept: FAMILY MEDICINE | Facility: CLINIC | Age: 74
End: 2024-05-13
Payer: COMMERCIAL

## 2024-05-28 ENCOUNTER — OFFICE VISIT (OUTPATIENT)
Dept: NEUROLOGY | Facility: CLINIC | Age: 74
End: 2024-05-28
Payer: COMMERCIAL

## 2024-05-28 VITALS
WEIGHT: 184 LBS | HEIGHT: 72 IN | BODY MASS INDEX: 24.92 KG/M2 | HEART RATE: 50 BPM | TEMPERATURE: 98.1 F | SYSTOLIC BLOOD PRESSURE: 106 MMHG | DIASTOLIC BLOOD PRESSURE: 68 MMHG

## 2024-05-28 DIAGNOSIS — G40.009 PARTIAL IDIOPATHIC EPILEPSY WITH SEIZURES OF LOCALIZED ONSET, NOT INTRACTABLE, WITHOUT STATUS EPILEPTICUS (H): Primary | ICD-10-CM

## 2024-05-28 DIAGNOSIS — M25.559 PAIN IN JOINT INVOLVING PELVIC REGION AND THIGH, UNSPECIFIED LATERALITY: ICD-10-CM

## 2024-05-28 RX ORDER — GABAPENTIN 300 MG/1
300 CAPSULE ORAL 3 TIMES DAILY
Qty: 270 CAPSULE | Refills: 3 | Status: SHIPPED | OUTPATIENT
Start: 2024-05-28 | End: 2024-06-21

## 2024-05-28 ASSESSMENT — PATIENT HEALTH QUESTIONNAIRE - PHQ9: SUM OF ALL RESPONSES TO PHQ QUESTIONS 1-9: 2

## 2024-05-28 NOTE — PROGRESS NOTES
Interval Hx. Taking gabapentin 300 mg tid for pain and now epilepsy Uses pillbox but misses some doses. Three  more events.. Two when wakin up, one while pasenger in car. On lastg pne he had missed some meds for ~ 1 week.   Events same- very foul smell, mild confusion, may last up to 30 mi,     History reviewe 5/28/24 during September 2023 while working in his Home [employer] he suddenly had a sudden onset of a very bad odor. It was hard to describe but it finally came up with burning rubber. It lasted approximately 4 to 5 minutes. At this time he also felt fuzzy. He did remember everything that was happening. No one observed this event. He did not have any abdominal rising sensation or any other symptoms. He called the fire department to determine if there was some kind of hidden fire, but none was detected. The second event occurred in October. His partner had opened a jar a face cream and he suddenly began to smell a very bad odor similar to the one he had smelled in September. His partner however did not smell anything. She did notice some jerking of his legs more like shaking lightly. He was fully aware of this event which again lasted a few minutes and it was accompanied by fuzziness. Later the same day he was helping his brother outdoors again we had a similar event. He went to a local emergency room where the differential diagnosis include a stroke or seizure. MRI scan was negative. Physical examination was negative. He was discharged home and he has had no further events.   Risk factors: birth history indicates his weight was approximately 8 lbs, no difficulties at birth and mother had no illnesses. No febrile convulsions. Minor concussion in school where he recovered consciousness with no known after effects. No history of drug abuse or heavy alcohol use.  Family history negative for epilepsy  review of systems: negative for stroke, heart disease, high blood pressure, diabetes. Has bradycardia.  MRI scan  report describes minimal small vessel disease  EEG for three hours with extensive sleep is within normal limits for age.  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  Physical examination: he is alert, slender, relaxed.  Speech is fluent but he does have some word finding difficulty during normal conversation. He recognizes that a $3 bill is fake recognizes former president but does not recall the name and is able to describe the humor.  Visual fields are full to confrontation, pupils are equal, eye movements are full without nystagmus. Facial movements are normal. Hearing is intact. Tongue and palate move normally.  Cerebellar testing: there is minor dysmetria on finger two nose testing. Tandem gate reveals mild ataxia, and this is apparent on walking down the stiles.  Balance is normal  motor tone and strength is normal.     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 With his history of mild word finding difficulty and mild memory issues, the left would be most likely. However the MRI and EG do not show any mesial temporal sclerosis or epileptiform activity. Paragraph  He has been on gabapentin for over a year for a painful shoulder and is comfortable continuing to use this at a higher dose for seizures. In fact, he has already increased the dose to 300 milligrams three times a day.  2) the cerebellar findings were a surprise and suggest a mild underlying cerebellar dysfunction. The cause of this is undetermined.     Plan  1)    continue gabapentin 300 milligrams TID. Increase to 600 HS if another seizure occurs. If this is not enough to prevent another seizure increase to 900 HS as he has found morning doses of gabapentin make him sleepy and the half life of gabapentin would be sufficient to carry  him through the day with a higher nighttime dose  2)    neuropsychological testing was ordered due inm June due to backlog  3)     RTC after n psych tesing and MRI  4)    because these are focal aware he is able to drive at the present time  5) MRI w/wo contrast      Time: pre visit  4min, face to face 31 min, post vist 3 min charting.    Sergio Zurita MD

## 2024-05-28 NOTE — LETTER
2024       RE: Neptali Moore  : 1950   MRN: [id number]      Dear Colleague,    Thank you for referring your patient, Neptali Moore, to the [facility] EPILEPSY CARE at [facility]. Please see a copy of my visit note below.    Interval Hx. Taking gabapentin 300 mg tid for pain and now epilepsy Uses pillbox but misses some doses. Three  more events.. Two when wakin up, one while pasenger in car. On last pne he had missed some meds for ~ 1 week.   Events same- very foul smell, mild confusion, may last up to 30 mi,     History reviewe 24 during 2023 while working in his [employer] he suddenly had a sudden onset of a very bad odor. It was hard to describe but it finally came up with burning rubber. It lasted approximately 4 to 5 minutes. At this time he also felt fuzzy. He did remember everything that was happening. No one observed this event. He did not have any abdominal rising sensation or any other symptoms. He called the fire department to determine if there was some kind of hidden fire, but none was detected. The second event occurred in October. His partner had opened a jar a face cream and he suddenly began to smell a very bad odor similar to the one he had smelled in September. His partner however did not smell anything. She did notice some jerking of his legs more like shaking lightly. He was fully aware of this event which again lasted a few minutes and it was accompanied by fuzziness. Later the same day he was helping his brother outdoors again we had a similar event. He went to a local emergency room where the differential diagnosis include a stroke or seizure. MRI scan was negative. Physical examination was negative. He was discharged home and he has had no further events.   Risk factors: birth history indicates his weight was approximately 8 lbs, no difficulties at birth and mother had no illnesses. No febrile  convulsions. Minor concussion in school where he recovered consciousness with no known after effects. No history of drug abuse or heavy alcohol use.  Family history negative for epilepsy  review of systems: negative for stroke, heart disease, high blood pressure, diabetes. Has bradycardia.  MRI scan report describes minimal small vessel disease  EEG for three hours with extensive sleep is within normal limits for age.  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  Physical examination: he is alert, slender, relaxed.  Speech is fluent but he does have some word finding difficulty during normal conversation. He recognizes that a $3 bill is fake recognizes former president but does not recall the name and is able to describe the humor.  Visual fields are full to confrontation, pupils are equal, eye movements are full without nystagmus. Facial movements are normal. Hearing is intact. Tongue and palate move normally.  Cerebellar testing: there is minor dysmetria on finger two nose testing. Tandem gate reveals mild ataxia, and this is apparent on walking down the stiles.  Balance is normal  motor tone and strength is normal.     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 With his history of mild word finding difficulty and mild memory issues, the left would be most likely. However the MRI and EG do not show any mesial temporal sclerosis or epileptiform activity. Paragraph  He has been on gabapentin for over a year for a painful shoulder and is comfortable continuing to use this at a higher dose for seizures. In fact, he has already increased the dose to 300 milligrams three times a day.  2) the cerebellar findings were a surprise and suggest a mild underlying cerebellar dysfunction. The cause of this is  undetermined.     Plan  1)    continue gabapentin 300 milligrams TID. Increase to 600 HS if another seizure occurs. If this is not enough to prevent another seizure increase to 900 HS as he has found morning doses of gabapentin make him sleepy and the half life of gabapentin would be sufficient to carry him through the day with a higher nighttime dose  2)    neuropsychological testing was ordered due inm June due to backlog  3)     RTC after n psych tesing and MRI  4)    because these are focal aware he is able to drive at the present time  5) MRI w/wo contrast      Time: pre visit  4min, face to face 31 min, post vist 3 min charting.        Again, thank you for allowing me to participate in the care of your patient.      Sincerely,    Sergio Zurita MD

## 2024-06-04 ENCOUNTER — TRANSFERRED RECORDS (OUTPATIENT)
Dept: HEALTH INFORMATION MANAGEMENT | Facility: CLINIC | Age: 74
End: 2024-06-04
Payer: COMMERCIAL

## 2024-06-18 ENCOUNTER — OFFICE VISIT (OUTPATIENT)
Dept: NEUROLOGY | Facility: CLINIC | Age: 74
End: 2024-06-18
Attending: PSYCHIATRY & NEUROLOGY
Payer: COMMERCIAL

## 2024-06-18 DIAGNOSIS — G40.909 SEIZURE DISORDER (H): ICD-10-CM

## 2024-06-18 DIAGNOSIS — F06.8 OTHER SPECIFIED MENTAL DISORDERS DUE TO KNOWN PHYSIOLOGICAL CONDITION: Primary | ICD-10-CM

## 2024-06-18 NOTE — LETTER
"2024       RE: Neptali Moore  : 1950   MRN: [id number]        Dear Colleague,    Thank you for referring your patient, Neptali Moore, to the [facility] EPILEPSY CARE at [facility]. Please see a copy of my visit note below.    Patient was seen for neuropsychological evaluation at the request of Dr. Sergio Zurita, for the purposes of diagnostic clarification and treatment planning.  1 hrs 55 min of test administration and scoring were provided by this writer, Eduarda Austin.  Please see Dr. Deon Bolton's report for a full interpretation of the findings.      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 He was unaccompanied to the interview.    The patient was in-clinic for the entirety of this evaluation. The interview and testing were completed face-to-face.     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  This event was characterized by a sensation of a burning smell.  He had similar events in  and 2 or 3 subsequent episodes.  These events are characterized by a sensation of smelling burning rubber and then feeling fuzzy.  He had one event in  in which there was also some leg jerking and shaking.  Please see Dr. Sergio Zurita's notes, most recently from May 28, 2024, for more details.  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  An MRI of his brain on 2023 documented \"1.  Mild involutional change and minimal chronic microvascular deep white matter disease. 2. No mass, hemorrhage, or acute territorial infarct. 3.  No evidence of developmental or migrational " "abnormalities. Normal symmetric appearance of the medial temporal lobes including the amygdala and hippocampus. No heterotopias or cortical abnormalities identified. 4.  No structural abnormality identified to correspond to history of seizure. 5.  No radiographically significant sinus disease.\"  An EEG study on November 6, 2023 was read as normal.  He is prescribed gabapentin.  His other medical history includes moderate depression, vitamin B12 deficiency, hip pain, and shoulder pain.  He has expressed concerns about cognition.  There is some question as to whether these cognitive difficulties are attributable to a seizure onset zone versus other factors.  The current evaluation was requested by Dr. Zurita, in this context.    On interview, Dr. Flood confirmed the above history.  He reported that he has had 4 or 5 likely seizure events.  The first of these was in late August, 2023.  He reported that the most recent event was in April or May, 2024.  He also had events in October, 2023, in March, 2024.  He denied a trigger for onset of these events.  He stated that these events have all had a similar presentation.  He stated they start with an odor like burning rubber.  He then has a feeling of fuzziness.  He sits quietly.  He does not lose consciousness.  He noted that there have been increases in his gabapentin dose.  He denied that he had febrile seizures.  He denied any other neurologic history.    Regarding cognition, Dr. Flood reported that he has always had troubles with names.  He noted that in the last few years, he has had more difficulty with name retrieval.  He indicated that his cognition has been slowly worsening.  He stated that these issues are most prominent in the last 1 to 2 years.  He noted that he now has troubles retrieving nouns.  He stated that words might come back to him 10 or 20 minutes later.  He described more troubles with short-term memory.  He described an incident in which he " forgot to turn off his garden hose.  He reported that he is slower to find his car in a parking lot.  He also described other incidental memory difficulties.  He stated that he might have slightly more difficulty with verbal memory than nonverbal memory.  He otherwise denied having identified changes in his cognition.  He denied that changes in his personality have been pointed out to him.    With respect to mental health, Dr. Flood reported that his mood is better than ever.  He noted that he has had fluctuations in his mood over the years.  He described having episodes of elevated energy at times but noted that his mental health never had any impact on his day-to-day functional capacity.  He described having a significant transition in his life when he retired approximately 5 years ago.  He began taking Wellbutrin a few years ago.  He stated that the dose of this medication was increased 1 to 2 years ago.  He stated that in the last month, his mood has been better.  He did note that he saw counselors in the past.  He is not currently seeing counselor.  He denied prior psychiatric hospitalization, hallucinations, or symptoms consistent with severe mental illness.  He denied trauma or abuse.  He denied suicidal ideation or past suicide attempts.    Regarding other medical background, Dr. Flood denied prior head injury or stroke.  He reported that his sleep is good.  He averages 8 or 9 hours of sleep per night.  He denied having apneas.  He denied symptoms of REM behavior disorder.  He slept normally the night before this exam.  He reported that he occasionally has pain in his right knee but denied pain at the time of the interview.  He acknowledged that some gait issues were noted in his neurology visit.  He stated that he might have occasional difficulties with turns.  He otherwise denied gross motor abnormalities.  He stated that he is had some subtle changes in his sense of smell.  Per records, his current  medications include bupropion and gabapentin.  He stated that he might have a couple drinks on the weekends but otherwise denied substance use.  He denied past problematic substance use.    In terms of family history, he reported that his brother may have some mental health issues.    Dr. Flood lives alone in his home.  He manages his own basic and instrumental daily activities.  He drives.    By way of background, Dr. Flood was  once in the past.  He has 2 adult children and 1 granddaughter.  He is .  He has been with his current partner for 5 years.  Regarding educational background, he graduated from high school.  He completed a bachelor's degree from [employer].  He also completed his medical training at [employer].  He completed a master's degree in public health from [employer].  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  He has also completed continuing education classes at the [employer] [facility].  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  He worked in hospital settings, for [employer], and at a smaller clinic in [place].  He retired approximately 5 years ago.    BEHAVIORAL OBSERVATIONS  Dr. Flood was pleasant and cooperative with the exam.  He engaged in limited spontaneous conversation with the examiner.  His speech was normal. His comprehension was normal. His thought processes were notable for mild carelessness/impulsivity. His mood was generally neutral with congruent affect. His effort was good. The current results are felt to be an accurate depiction of his cognitive functioning.      RESULTS OF EXAM  His performances on standardized measures of neuropsychological functioning were as follows.     He was fully oriented to time, place, and various aspects of personal information.  Performance on a measure of single word reading was average.  He obtained passing  scores on stand-alone and embedded metrics of cognitive performance validity.  Auditory attention for digits was average.  Mental calculations were high average.  Learning of words in a list format was performed mildly below expectation.  Short delayed recall of the list words was average.  30-minute delayed recall list words was average.  Delayed recognition of list words was performed in the high average to superior range.  Learning of story information was high average.  Delayed recall of story information was superior.  Delayed recognition of story information was high average.  Immediate memory for simple geometric shapes was average.  His drawing of a complicated geometric figure was normal.  Short delayed recall of the figure was superior.  30-minute delayed recall of the figure was superior.  Delayed recognition of the figure's elements was average.  Visual perceptual matching of faces was performed within normal limits.  Visual problems with blocks was high average.  Comprehension of phrases and short stories was average, and performed without error.  Verbal associative fluency was average.  Animal fluency was average.  Naming to confrontation was average.  Verbal abstract reasoning was superior.  Speeded visual sequencing under focused attention was superior.  A similar measure with a divided attention component was performed in the average to high average range, but with 2 areas.  Speeded word reading was average.  Speeded color naming was superior.  Speeded inhibition of an overlearned response was average.  Speeded visual motor coding was superior.  Speeded fine motor dexterity was average, bilaterally.    He endorsed items consistent with minimal symptoms of depression and minimal symptoms of anxiety on self-report measures.    IMPRESSIONS  Assuming conventionally arranged functional neuroanatomy, Dr. Flood demonstrated weaknesses that are consistent with subtle dysfunction of the left lateral  frontal region.  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  Another potential contributor would be his documented cerebrovascular disease.  On testing, mild weaknesses were identified in auditory attention, verbal fluency, and some aspects of verbal learning.  Other cognitive abilities, including both verbal and nonverbal anterograde memory, were generally normal and performed in keeping with his above average range cognitive baseline.  He is not reporting elevated symptoms of depression or anxiety.    RECOMMENDATIONS  Preliminary results and recommendations were provided to the patient at the time of the evaluation, and all questions were answered.     1.  Continued neurologic care is recommended.  He described having a potential seizure within the last month.  If medically indicated, consideration might be given to further refinement of the treatment for his seizures.    2.  If he continues to have difficulties with memory, routine use of a memory notebook or other assistive device could be of benefit.    3. Follow-up neuropsychological evaluation is recommended in 1 year in order to assess and update recommendations as appropriate.  The current results can be used as a baseline at that time.      Time spent:  One unit psychiatric diagnostic interview including interview and clinical assessment by licensed and board-certified neuropsychologist (CPT 69577). One unit (60 minutes) neuropsychological testing evaluation by licensed and board-certified neuropsychologist, including integration of patient data, interpretation of standardized test results and clinical data, clinical decision-making, treatment planning, and report, first hour (CPT 83822). One unit(s) (35 minutes) of neuropsychological testing evaluation by licensed and board-certified neuropsychologist, including integration of patient data, interpretation of standardized test results and clinical data, clinical decision-making, treatment  planning, and report, subsequent hours (CPT 90895). One unit (30 minutes) of psychological and neuropsychological test administration and scoring by technician, first 30 minutes (CPT 86187). Three units (87 minutes) psychological or neuropsychological test administration and scoring by technician, subsequent 30 minutes (CPT 17336). Diagnoses: G40.909, F06.8.        Again, thank you for allowing me to participate in the care of your patient.      Sincerely,    Deon Bolton, PhD LP

## 2024-06-18 NOTE — PROGRESS NOTES
Patient was seen for neuropsychological evaluation at the request of Dr. eSrgio Zurita, for the purposes of diagnostic clarification and treatment planning.  1 hrs 55 min of test administration and scoring were provided by this writer, Eduarda Austin.  Please see Dr. Deon Bolton's report for a full interpretation of the findings.

## 2024-06-19 NOTE — PROGRESS NOTES
Name: Neptali Hickman MRN: [id number]  : 1950  ALICEA: 2024  Staff: RIZWANA Tech: SHEILA Age: 74  Sex: Male Hand: Right Educ: 20+  Vision: 20/20 ?with correction / []without correction    ORIENTATION     Time  -0     Place       Personal info         WAIS-IV        WMI: ~105       Raw SSa     Similarities  30 14     Block Design  38 12     Digit Span  25 10 RDS= 9     Arithmetic  16 12     Coding  68 14     AVLT      Trial 1 2 3 4 5 B 6             5 4 8 8 11 5 7      Raw %ile     Learning Over Trials (1-5) 36     Short Delay Recall  7 44-53     30' Recall   8 70-72     30' Recognition Hits  15      30' False Positives  1     WMS-IV  Raw SS / %ile     LM I  39 13     LM II  27 14     LM Recog. 22  >75    CANDE-O    Raw  T %ile     Copy    32.0     >16     Short Delay Recall 21.5 68 96     Long Delay Recall 20.5 67 96     Recognition Total 21 55 69     Time to Copy  97     >16    BVRT     Form C      Raw z/%ile   Correct  7 0.54   Incorrect  4 0.61    WRAT5   SS %ile Grade Equiv.     Word Reading  98 45 >12.9    COWAT (FAS)   Raw: 41   T: 46    Animals   Raw:  21  T-score:  53    BOSTON NAMING TEST   Raw: 55   %ile 71-75    COMPLEX IDEATIONAL MATERIAL   Raw:  T: 53    TRAILS  Raw  Err %ile    A 21  0 100   B 96  2 67-80    STROOP Raw %ile   Word 83 40-53   Color  75        Color/Word  28 40-47    SLOAN FACIAL RECOGNITION   Raw: 49  Interp: WNL    GROOVED PEGBOARD    Raw  T Drops   RH  77  49 0   LH 81  53 0    BDI-II  Raw:  4  Interpretation: Minimal    RAMON  Raw:  6  Interpretation: Minimal    DCT E-score: 8

## 2024-06-19 NOTE — PROGRESS NOTES
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 He was unaccompanied to the interview.    The patient was in-clinic for the entirety of this evaluation. The interview and testing were completed face-to-face.     BACKGROUND INFORMATION / INTERVIEW FINDINGS    Records indicate that DrKelly Flood suffered an initial possible seizure event on August 28, 2023.  This event was characterized by a sensation of a burning smell.  He had similar events in October, 2023 and 2 or 3 subsequent episodes.  These events are characterized by a sensation of smelling burning rubber and then feeling fuzzy.  He had one event in October, 2023 in which there was also some leg jerking and shaking.  Please see Dr. Sergio Zurita's notes, most recently from May 28, 2024, for more details.  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  An MRI of his brain on October 9, 2023 documented \"1.  Mild involutional change and minimal chronic microvascular deep white matter disease. 2. No mass, hemorrhage, or acute territorial infarct. 3.  No evidence of developmental or migrational abnormalities. Normal symmetric appearance of the medial temporal lobes including the amygdala and hippocampus. No heterotopias or cortical abnormalities identified. 4.  No structural abnormality identified to correspond to history of seizure. 5.  No radiographically significant sinus disease.\"  An EEG study on November 6, 2023 was read as normal.  He is prescribed gabapentin.  His other medical history includes moderate depression, vitamin B12 deficiency, hip pain, and shoulder pain.  He has expressed concerns about cognition.  There is some question as to whether these cognitive difficulties are attributable to a seizure onset " zone versus other factors.  The current evaluation was requested by Dr. Zurita, in this context.    On interview, Dr. Flood confirmed the above history.  He reported that he has had 4 or 5 likely seizure events.  The first of these was in late August, 2023.  He reported that the most recent event was in April or May, 2024.  He also had events in October, 2023, in March, 2024.  He denied a trigger for onset of these events.  He stated that these events have all had a similar presentation.  He stated they start with an odor like burning rubber.  He then has a feeling of fuzziness.  He sits quietly.  He does not lose consciousness.  He noted that there have been increases in his gabapentin dose.  He denied that he had febrile seizures.  He denied any other neurologic history.    Regarding cognition, Dr. Flood reported that he has always had troubles with names.  He noted that in the last few years, he has had more difficulty with name retrieval.  He indicated that his cognition has been slowly worsening.  He stated that these issues are most prominent in the last 1 to 2 years.  He noted that he now has troubles retrieving nouns.  He stated that words might come back to him 10 or 20 minutes later.  He described more troubles with short-term memory.  He described an incident in which he forgot to turn off his garden hose.  He reported that he is slower to find his car in a parking lot.  He also described other incidental memory difficulties.  He stated that he might have slightly more difficulty with verbal memory than nonverbal memory.  He otherwise denied having identified changes in his cognition.  He denied that changes in his personality have been pointed out to him.    With respect to mental health, Dr. Flood reported that his mood is better than ever.  He noted that he has had fluctuations in his mood over the years.  He described having episodes of elevated energy at times but noted that his mental  health never had any impact on his day-to-day functional capacity.  He described having a significant transition in his life when he retired approximately 5 years ago.  He began taking Wellbutrin a few years ago.  He stated that the dose of this medication was increased 1 to 2 years ago.  He stated that in the last month, his mood has been better.  He did note that he saw counselors in the past.  He is not currently seeing counselor.  He denied prior psychiatric hospitalization, hallucinations, or symptoms consistent with severe mental illness.  He denied trauma or abuse.  He denied suicidal ideation or past suicide attempts.    Regarding other medical background,  Aleena denied prior head injury or stroke.  He reported that his sleep is good.  He averages 8 or 9 hours of sleep per night.  He denied having apneas.  He denied symptoms of REM behavior disorder.  He slept normally the night before this exam.  He reported that he occasionally has pain in his right knee but denied pain at the time of the interview.  He acknowledged that some gait issues were noted in his neurology visit.  He stated that he might have occasional difficulties with turns.  He otherwise denied gross motor abnormalities.  He stated that he is had some subtle changes in his sense of smell.  Per records, his current medications include bupropion and gabapentin.  He stated that he might have a couple drinks on the weekends but otherwise denied substance use.  He denied past problematic substance use.    In terms of family history, he reported that his brother may have some mental health issues.     Aleena lives alone in his home.  He manages his own basic and instrumental daily activities.  He drives.    By way of background,  Aleena was  once in the past.  He has 2 adult children and 1 granddaughter.  He is .  He has been with his current partner for 5 years.  Regarding educational background, he graduated from  high school.  He completed a bachelor's degree from [employer].  He also completed his medical training at [employer].  He completed a master's degree in public health from [employer].  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  He has also completed continuing education classes at the [employer].  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  He worked in hospital settings, for [employer], and at a smaller clinic in [place].  He retired approximately 5 years ago.    BEHAVIORAL OBSERVATIONS  Dr. Flood was pleasant and cooperative with the exam.  He engaged in limited spontaneous conversation with the examiner.  His speech was normal. His comprehension was normal. His thought processes were notable for mild carelessness/impulsivity. His mood was generally neutral with congruent affect. His effort was good. The current results are felt to be an accurate depiction of his cognitive functioning.      RESULTS OF EXAM  His performances on standardized measures of neuropsychological functioning were as follows.     He was fully oriented to time, place, and various aspects of personal information.  Performance on a measure of single word reading was average.  He obtained passing scores on stand-alone and embedded metrics of cognitive performance validity.  Auditory attention for digits was average.  Mental calculations were high average.  Learning of words in a list format was performed mildly below expectation.  Short delayed recall of the list words was average.  30-minute delayed recall list words was average.  Delayed recognition of list words was performed in the high average to superior range.  Learning of story information was high average.  Delayed recall of story information was superior.  Delayed recognition of story information was high average.  Immediate memory for simple geometric  shapes was average.  His drawing of a complicated geometric figure was normal.  Short delayed recall of the figure was superior.  30-minute delayed recall of the figure was superior.  Delayed recognition of the figure's elements was average.  Visual perceptual matching of faces was performed within normal limits.  Visual problems with blocks was high average.  Comprehension of phrases and short stories was average, and performed without error.  Verbal associative fluency was average.  Animal fluency was average.  Naming to confrontation was average.  Verbal abstract reasoning was superior.  Speeded visual sequencing under focused attention was superior.  A similar measure with a divided attention component was performed in the average to high average range, but with 2 areas.  Speeded word reading was average.  Speeded color naming was superior.  Speeded inhibition of an overlearned response was average.  Speeded visual motor coding was superior.  Speeded fine motor dexterity was average, bilaterally.    He endorsed items consistent with minimal symptoms of depression and minimal symptoms of anxiety on self-report measures.    IMPRESSIONS  Assuming conventionally arranged functional neuroanatomy, Dr. Flood demonstrated weaknesses that are consistent with subtle dysfunction of the left lateral frontal region.  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  Another potential contributor would be his documented cerebrovascular disease.  On testing, mild weaknesses were identified in auditory attention, verbal fluency, and some aspects of verbal learning.  Other cognitive abilities, including both verbal and nonverbal anterograde memory, were generally normal and performed in keeping with his above average range cognitive baseline.  He is not reporting elevated symptoms of depression or anxiety.    RECOMMENDATIONS  Preliminary results and recommendations were provided to the patient at the time of the  evaluation, and all questions were answered.     1.  Continued neurologic care is recommended.  He described having a potential seizure within the last month.  If medically indicated, consideration might be given to further refinement of the treatment for his seizures.    2.  If he continues to have difficulties with memory, routine use of a memory notebook or other assistive device could be of benefit.    3. Follow-up neuropsychological evaluation is recommended in 1 year in order to assess and update recommendations as appropriate.  The current results can be used as a baseline at that time.    Deon Bolton, Ph.D., L.P., ABPP  Board Certified in Clinical Neuropsychology   / Licensed Psychologist [id number]    Time spent:  One unit psychiatric diagnostic interview including interview and clinical assessment by licensed and board-certified neuropsychologist (CPT 37074). One unit (60 minutes) neuropsychological testing evaluation by licensed and board-certified neuropsychologist, including integration of patient data, interpretation of standardized test results and clinical data, clinical decision-making, treatment planning, and report, first hour (CPT 11562). One unit(s) (35 minutes) of neuropsychological testing evaluation by licensed and board-certified neuropsychologist, including integration of patient data, interpretation of standardized test results and clinical data, clinical decision-making, treatment planning, and report, subsequent hours (CPT 27643). One unit (30 minutes) of psychological and neuropsychological test administration and scoring by technician, first 30 minutes (CPT 13062). Three units (87 minutes) psychological or neuropsychological test administration and scoring by technician, subsequent 30 minutes (CPT 88586). Diagnoses: G40.909, F06.8.

## 2024-06-20 ENCOUNTER — MYC MEDICAL ADVICE (OUTPATIENT)
Dept: NEUROLOGY | Facility: CLINIC | Age: 74
End: 2024-06-20

## 2024-06-20 DIAGNOSIS — M25.559 PAIN IN JOINT INVOLVING PELVIC REGION AND THIGH, UNSPECIFIED LATERALITY: ICD-10-CM

## 2024-06-21 RX ORDER — GABAPENTIN 300 MG/1
CAPSULE ORAL
Qty: 360 CAPSULE | Refills: 3 | Status: SHIPPED | OUTPATIENT
Start: 2024-06-21 | End: 2024-07-22

## 2024-06-24 ENCOUNTER — HOSPITAL ENCOUNTER (OUTPATIENT)
Dept: MRI IMAGING | Facility: CLINIC | Age: 74
Discharge: HOME | End: 2024-06-24
Attending: PSYCHIATRY & NEUROLOGY | Admitting: PSYCHIATRY & NEUROLOGY
Payer: COMMERCIAL

## 2024-06-24 DIAGNOSIS — G40.009 PARTIAL IDIOPATHIC EPILEPSY WITH SEIZURES OF LOCALIZED ONSET, NOT INTRACTABLE, WITHOUT STATUS EPILEPTICUS (H): ICD-10-CM

## 2024-06-24 PROCEDURE — 255N000002 HC RX 255 OP 636: Mod: JZ | Performed by: PSYCHIATRY & NEUROLOGY

## 2024-06-24 PROCEDURE — A9585 GADOBUTROL INJECTION: HCPCS | Mod: JZ | Performed by: PSYCHIATRY & NEUROLOGY

## 2024-06-24 PROCEDURE — 70553 MRI BRAIN STEM W/O & W/DYE: CPT

## 2024-06-24 PROCEDURE — 70553 MRI BRAIN STEM W/O & W/DYE: CPT | Mod: 26 | Performed by: RADIOLOGY

## 2024-06-24 RX ORDER — GADOBUTROL 604.72 MG/ML
10 INJECTION INTRAVENOUS ONCE
Status: COMPLETED | OUTPATIENT
Start: 2024-06-24 | End: 2024-06-24

## 2024-06-24 RX ADMIN — GADOBUTROL 8.5 ML: 604.72 INJECTION INTRAVENOUS at 08:44

## 2024-06-30 ENCOUNTER — HOSPITAL ENCOUNTER (EMERGENCY)
Facility: CLINIC | Age: 74
Discharge: HOME | End: 2024-06-30
Attending: INTERNAL MEDICINE | Admitting: INTERNAL MEDICINE
Payer: COMMERCIAL

## 2024-06-30 VITALS
TEMPERATURE: 98 F | HEIGHT: 72 IN | HEART RATE: 50 BPM | DIASTOLIC BLOOD PRESSURE: 79 MMHG | SYSTOLIC BLOOD PRESSURE: 150 MMHG | OXYGEN SATURATION: 98 % | BODY MASS INDEX: 23.03 KG/M2 | WEIGHT: 170 LBS | RESPIRATION RATE: 18 BRPM
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
## 2024-06-30 PROCEDURE — 99283 EMERGENCY DEPT VISIT LOW MDM: CPT | Performed by: INTERNAL MEDICINE

## 2024-06-30 RX ORDER — TRIAMCINOLONE ACETONIDE 1 MG/G
OINTMENT TOPICAL 2 TIMES DAILY
Qty: 30 G | Refills: 0 | Status: SHIPPED | OUTPATIENT
Start: 2024-06-30 | End: 2024-09-30

## 2024-06-30 ASSESSMENT — ACTIVITIES OF DAILY LIVING (ADL): ADLS_ACUITY_SCORE: 35

## 2024-06-30 NOTE — DISCHARGE INSTRUCTIONS
Please make an appointment to follow up with Your Primary Care Provider in 3-7 days if not improving.    4 = No assist / stand by assistance

## 2024-06-30 NOTE — ED PROVIDER NOTES
"    [facility] EMERGENCY DEPARTMENT ([facility])    24      ED PROVIDER NOTE    History     Chief Complaint   Patient presents with    Rash     Pt reports rash/itching under bilateral armpit, groin, at the tailbone area. Pt noted the rash last night. Per pt, \"I think is bug bite.\"      HPI  Neptali Moore is a 74-year-old male with no significant past medical history and no recent change in medications presenting with a rash pretty much all over his body.  He just noticed this morning but he does recall he has some pruritus happened in the past.  This is very pruritic and located in the armpits, back and in the groin area.  He denies any other complaints.    Past Medical History  Past Medical History:   Diagnosis Date    Arthritis     Depressive disorder see chart     Past Surgical History:   Procedure Laterality Date    BIOPSY  this year    upper endoscopy,skin chest    COLONOSCOPY      biopsies    ESOPHAGOSCOPY, GASTROSCOPY, DUODENOSCOPY (EGD), COMBINED N/A 2019    Procedure: ESOPHAGOGASTRODUODENOSCOPY, WITH BIOPSY;  Surgeon: Oliva Youngblood MD;  Location: [facility] OR    EXTRACTION(S) DENTAL      KNEE SURGERY      bilateral; partial meniscus removal    ORTHOPEDIC SURGERY  3 years ago, decades ago    shoulder, rotator cuff, elbow    SOFT TISSUE SURGERY  20 + years ago    medial meniscus removal (both sides, different times)    TONSILLECTOMY       triamcinolone (KENALOG) 0.1 % external ointment  buPROPion (WELLBUTRIN XL) 150 MG 24 hr tablet  buPROPion (WELLBUTRIN XL) 300 MG 24 hr tablet  gabapentin (NEURONTIN) 300 MG capsule      No Known Allergies  Family History  Family History   Problem Relation Age of Onset    Dementia Mother     Arthritis Mother     Osteoporosis Mother          at 88    Obesity Mother     Pacemaker Father     Cerebrovascular Disease Father     Obesity Father     Other Cancer Brother         Head and Neck    Depression Other         exasperate reactive    " Depression Nephew         likely drug related    Mental Illness Nephew         drug use +    Substance Abuse Nephew     Mental Illness Cousin         drug use +    Mental Illness Brother         drug use +    Substance Abuse Brother     Substance Abuse Brother      Social History   Social History     Tobacco Use    Smoking status: Never    Smokeless tobacco: Never   Vaping Use    Vaping status: Never Used   Substance Use Topics    Alcohol use: Yes     Alcohol/week: 3.0 - 6.0 standard drinks of alcohol     Types: 1 - 2 Glasses of wine, 1 - 2 Cans of beer, 1 - 2 Shots of liquor per week     Comment: 2-3 drinks/week    Drug use: No      A complete review of systems was performed with pertinent positives and negatives noted in the HPI, and all other systems negative.    Physical Exam   BP: (!) 150/79  Pulse: 50  Temp: 98  F (36.7  C)  Resp: 18  Height: 182.9 cm (6')  Weight: 77.1 kg (170 lb)  SpO2: 98 %  Physical Exam  Constitutional:       General: He is not in acute distress.     Appearance: He is not diaphoretic.   HENT:      Head: Atraumatic.   Eyes:      General: No scleral icterus.     Pupils: Pupils are equal, round, and reactive to light.   Cardiovascular:      Rate and Rhythm: Normal rate and regular rhythm.      Heart sounds: Normal heart sounds. No murmur heard.     No friction rub. No gallop.   Pulmonary:      Effort: No respiratory distress.      Breath sounds: Normal breath sounds. No stridor. No wheezing, rhonchi or rales.   Chest:      Chest wall: No tenderness.   Abdominal:      General: Abdomen is flat. Bowel sounds are normal. There is no distension.      Palpations: Abdomen is soft. There is no mass.      Tenderness: There is no abdominal tenderness. There is no right CVA tenderness, left CVA tenderness, guarding or rebound.      Hernia: No hernia is present.   Musculoskeletal:         General: No tenderness.   Skin:     General: Skin is warm.      Findings: Lesion (consistent with insect bites/?bed  bug) present. No rash.           ED Course, Procedures, & Data      Procedures            No results found for any visits on 06/30/24.  Medications - No data to display  Labs Ordered and Resulted from Time of ED Arrival to Time of ED Departure - No data to display  No orders to display          Critical care was not performed.     Medical Decision Making  The patient's presentation was of low complexity (an acute and uncomplicated illness or injury).    The patient's evaluation involved:  history and exam without other MDM data elements    The patient's management necessitated moderate risk (prescription drug management including medications given in the ED).    Assessment & Plan    Acute scattered skin lesion suspected bed bug or other insect bites, will discharge with triamcinolone ointment, follow up with his PMD.    I have reviewed the nursing notes. I have reviewed the findings, diagnosis, plan and need for follow up with the patient.    Discharge Medication List as of 6/30/2024  7:21 AM        START taking these medications    Details   triamcinolone (KENALOG) 0.1 % external ointment Apply topically 2 times dailyDisp-30 g, [id number]             Final diagnoses:   Bedbug bite, initial encounter       Yogesh Corado MD    [facility] EMERGENCY DEPARTMENT  6/30/2024        Yogesh Corado MD  06/30/24 3902

## 2024-06-30 NOTE — ED TRIAGE NOTES
Pt reports had similar symptoms a year ago. Would like a proper diagnosis.      Triage Assessment (Adult)       Row Name 06/30/24 0637          Respiratory WDL    Respiratory WDL WDL        Skin Circulation/Temperature WDL    Skin Circulation/Temperature WDL WDL        Cardiac WDL    Cardiac WDL WDL        Cognitive/Neuro/Behavioral WDL    Cognitive/Neuro/Behavioral WDL WDL

## 2024-07-01 ENCOUNTER — PATIENT OUTREACH (OUTPATIENT)
Dept: FAMILY MEDICINE | Facility: CLINIC | Age: 74
End: 2024-07-01
Payer: COMMERCIAL

## 2024-07-02 ENCOUNTER — OFFICE VISIT (OUTPATIENT)
Dept: NEUROLOGY | Facility: CLINIC | Age: 74
End: 2024-07-02
Payer: COMMERCIAL

## 2024-07-02 VITALS
HEIGHT: 72 IN | TEMPERATURE: 98 F | WEIGHT: 181.6 LBS | DIASTOLIC BLOOD PRESSURE: 67 MMHG | SYSTOLIC BLOOD PRESSURE: 115 MMHG | HEART RATE: 51 BPM | BODY MASS INDEX: 24.6 KG/M2 | OXYGEN SATURATION: 95 %

## 2024-07-02 DIAGNOSIS — G40.009 PARTIAL IDIOPATHIC EPILEPSY WITH SEIZURES OF LOCALIZED ONSET, NOT INTRACTABLE, WITHOUT STATUS EPILEPTICUS (H): Primary | ICD-10-CM

## 2024-07-02 NOTE — LETTER
"2024       RE: Neptali Moore  : 1950   MRN: [id number]      Dear Colleague,    Thank you for referring your patient, Neptali Moore, to the [facility] EPILEPSY CARE at [facility]. Please see a copy of my visit note below.    Interval Hx. Was taking gabapentin 300  pain, and we increased it to 300+300+600 for epilepsy. MRI showed left mesial temporal sclerosis; neuropsych confirmed left \"dominant\" fronto-temmporal issues, so all fits.Uses pillbox but misses some doses. No more events except 2 episodes of \"brain fuzz\"    History reviewed 24 During 2023 while working in his Home [employer] he suddenly had a sudden onset of a very bad odor. It was hard to describe but it finally came up with burning rubber. It lasted approximately 4 to 5 minutes. At this time he also felt fuzzy. He did remember everything that was happening. No one observed this event. He did not have any abdominal rising sensation or any other symptoms. He called the fire department to determine if there was some kind of hidden fire, but none was detected. The second event occurred in October. His partner had opened a jar a face cream and he suddenly began to smell a very bad odor similar to the one he had smelled in September. His partner however did not smell anything. She did notice some jerking of his legs more like shaking lightly. He was fully aware of this event which again lasted a few minutes and it was accompanied by fuzziness. Later the same day he was helping his brother outdoors again we had a similar event. He went to a local emergency room where the differential diagnosis include a stroke or seizure. MRI scan was negative. Physical examination was negative. He was discharged home and he has had no further events.   Risk factors: birth history indicates his weight was approximately 8 lbs, no difficulties at birth and mother had no illnesses. No febrile " convulsions. Minor concussion in school where he recovered consciousness with no known after effects. No history of drug abuse or heavy alcohol use.  Family history negative for epilepsy  review of systems: negative for stroke, heart disease, high blood pressure, diabetes. Has bradycardia.  MRI scan report describes minimal small vessel disease  EEG for three hours with extensive sleep is within normal limits for age.  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  Physical examination: he is alert, slender, relaxed.  Speech is fluent but he does have some word finding difficulty during normal conversation. He recognizes that a $3 bill is fake recognizes former president but does not recall the name and is able to describe the humor.  Visual fields are full to confrontation, pupils are equal, eye movements are full without nystagmus. Facial movements are normal. Hearing is intact. Tongue and palate move normally.  Cerebellar testing: there is minor dysmetria on finger two nose testing. Tandem gate reveals mild ataxia, and this is apparent on walking down the stiles.  Balance is normal  motor tone and strength is normal.     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 With his history of mild word finding difficulty and mild memory issues, the left would be most likely. However the MRI and EG do not show any mesial temporal sclerosis or epileptiform activity.    He has been on gabapentin for over a year for a painful shoulder and is comfortable continuing to use this at a higher dose for seizures. In fact, he has already increased the dose to 300 milligrams three times a day.  2) the cerebellar findings were a surprise and suggest a mild underlying cerebellar dysfunction. The cause of this is  undetermined.     Plan  1)    continue gabapentin 300 milligrams TID. Increase to 600 HS if another seizure occurs. If this is not enough to prevent another seizure increase to 900 HS as he has found morning doses of gabapentin make him sleepy and the half life of gabapentin would be sufficient to carry him through the day with a higher nighttime dose  2) blood level  3) RTC 9 mo     4)    because these are  now focal aware he is able to drive at the present time      Time: pre visit  4min, face to face 33  min discusimng MRI and neuropsych findings, post vist 3 min charting.        Again, thank you for allowing me to participate in the care of your patient.      Sincerely,    Sergio Zurita MD

## 2024-07-02 NOTE — PROGRESS NOTES
"Interval Hx. Was taking gabapentin 300  pain, and we increased it to 300+300+600 for epilepsy. MRI showed left mesial temporal sclerosis; neuropsych confirmed left \"dominant\" fronto-temmporal issues, so all fits.Uses pillbox but misses some doses. No more events except 2 episodes of \"brain fuzz\"    History reviewed 7/2/24 During September 2023 while working in his Home [employer] he suddenly had a sudden onset of a very bad odor. It was hard to describe but it finally came up with burning rubber. It lasted approximately 4 to 5 minutes. At this time he also felt fuzzy. He did remember everything that was happening. No one observed this event. He did not have any abdominal rising sensation or any other symptoms. He called the fire department to determine if there was some kind of hidden fire, but none was detected. The second event occurred in October. His partner had opened a jar a face cream and he suddenly began to smell a very bad odor similar to the one he had smelled in September. His partner however did not smell anything. She did notice some jerking of his legs more like shaking lightly. He was fully aware of this event which again lasted a few minutes and it was accompanied by fuzziness. Later the same day he was helping his brother outdoors again we had a similar event. He went to a local emergency room where the differential diagnosis include a stroke or seizure. MRI scan was negative. Physical examination was negative. He was discharged home and he has had no further events.   Risk factors: birth history indicates his weight was approximately 8 lbs, no difficulties at birth and mother had no illnesses. No febrile convulsions. Minor concussion in school where he recovered consciousness with no known after effects. No history of drug abuse or heavy alcohol use.  Family history negative for epilepsy  review of systems: negative for stroke, heart disease, high blood pressure, diabetes. Has bradycardia.  MRI scan " report describes minimal small vessel disease  EEG for three hours with extensive sleep is within normal limits for age.  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  Physical examination: he is alert, slender, relaxed.  Speech is fluent but he does have some word finding difficulty during normal conversation. He recognizes that a $3 bill is fake recognizes former president but does not recall the name and is able to describe the humor.  Visual fields are full to confrontation, pupils are equal, eye movements are full without nystagmus. Facial movements are normal. Hearing is intact. Tongue and palate move normally.  Cerebellar testing: there is minor dysmetria on finger two nose testing. Tandem gate reveals mild ataxia, and this is apparent on walking down the stiles.  Balance is normal  motor tone and strength is normal.     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 With his history of mild word finding difficulty and mild memory issues, the left would be most likely. However the MRI and EG do not show any mesial temporal sclerosis or epileptiform activity.    He has been on gabapentin for over a year for a painful shoulder and is comfortable continuing to use this at a higher dose for seizures. In fact, he has already increased the dose to 300 milligrams three times a day.  2) the cerebellar findings were a surprise and suggest a mild underlying cerebellar dysfunction. The cause of this is undetermined.     Plan  1)    continue gabapentin 300 milligrams TID. Increase to 600 HS if another seizure occurs. If this is not enough to prevent another seizure increase to 900 HS as he has found morning doses of gabapentin make him sleepy and the half life of gabapentin would be sufficient to carry him  through the day with a higher nighttime dose  2) blood level  3) RTC 9 mo     4)    because these are  now focal aware he is able to drive at the present time      Time: pre visit  4min, face to face 33  min discusimng MRI and neuropsych findings, post vist 3 min charting.    Sergio Zurita MD

## 2024-07-02 NOTE — TELEPHONE ENCOUNTER
Transitions of Care Outreach  Chief Complaint   Patient presents with    Emergency Room Follow Up     Discharge date 6/30/24       Most Recent Admission Date: 6/30/2024   Most Recent Admission Diagnosis:      Most Recent Discharge Date: 6/30/2024   Most Recent Discharge Diagnosis: Bedbug bite, initial encounter - W57.XXXA     Transitions of Care Assessment    Discharge Assessment  How are you doing now that you are home?: doing better with skin cream  How are your symptoms? (Red Flag symptoms escalate to triage hotline per guidelines): Improved  Do you know how to contact your clinic care team if you have future questions or changes to your health status? : Yes  Does the patient have their discharge instructions? : Yes  Does the patient have questions regarding their discharge instructions? : No  Were you started on any new medications or were there changes to any of your previous medications? : Yes  Does the patient have all of their medications?: Yes  Do you have questions regarding any of your medications? : No  Do you have all of your needed medical supplies or equipment (DME)?  (i.e. oxygen tank, CPAP, cane, etc.): No - What equipment or supplies are needed?    Follow up Plan     Discharge Follow-Up  Discharge follow up appointment scheduled in alignment with recommended follow up timeframe or Transitions of Risk Category? (Low = within 30 days; Moderate= within 14 days; High= within 7 days): No  Patient's follow up appointment not scheduled: Patient declined scheduling support. Education on the importance of transitions of care follow up. Provided scheduling phone number.    Future Appointments   Date Time Provider Department Center   7/2/2024  2:00 PM [facility]   9/30/2024  9:00 AM Mark Jones MD [facility]       Outpatient Plan as outlined on AVS reviewed with patient.    For any urgent concerns, please contact our 24 hour nurse triage line: [phone] ([phone])          PT feels the rash was from chiggers,from gardening.  Rash is improved with cream.    Pt then made his next annual wellness visit.  Segun Granados RN

## 2024-07-05 LAB — GABAPENTIN SERPLBLD-MCNC: 8.3 UG/ML

## 2024-07-16 ENCOUNTER — TRANSFERRED RECORDS (OUTPATIENT)
Dept: HEALTH INFORMATION MANAGEMENT | Facility: CLINIC | Age: 74
End: 2024-07-16
Payer: COMMERCIAL

## 2024-07-20 ENCOUNTER — MYC MEDICAL ADVICE (OUTPATIENT)
Dept: NEUROLOGY | Facility: CLINIC | Age: 74
End: 2024-07-20

## 2024-07-20 DIAGNOSIS — M25.559 PAIN IN JOINT INVOLVING PELVIC REGION AND THIGH, UNSPECIFIED LATERALITY: ICD-10-CM

## 2024-07-22 RX ORDER — GABAPENTIN 300 MG/1
CAPSULE ORAL
Qty: 450 CAPSULE | Refills: 3 | Status: SHIPPED | OUTPATIENT
Start: 2024-07-22

## 2024-07-22 NOTE — TELEPHONE ENCOUNTER
"Plan from recent visit with  (7/2/2024)  \"Plan  1)    continue gabapentin 300 milligrams TID. Increase to 600 HS if another seizure occurs. If this is not enough to prevent another seizure increase to 900 HS as he has found morning doses of gabapentin make him sleepy and the half life of gabapentin would be sufficient to carry him through the day with a higher nighttime dose  2) blood level  3) RTC 9 mo     4)    because these are  now focal aware he is able to drive at the present time\"        Per the patient message he has increase the gabapentin due to seizure activity and is now taking a total of 5x300 mg tabs per day.  He is requesting and updated Rx.    Rx update per protocol                        "

## 2024-07-28 ENCOUNTER — OFFICE VISIT (OUTPATIENT)
Dept: URGENT CARE | Facility: URGENT CARE | Age: 74
End: 2024-07-28
Payer: COMMERCIAL

## 2024-07-28 VITALS
DIASTOLIC BLOOD PRESSURE: 73 MMHG | WEIGHT: 176 LBS | TEMPERATURE: 97.9 F | SYSTOLIC BLOOD PRESSURE: 119 MMHG | OXYGEN SATURATION: 97 % | HEIGHT: 72 IN | HEART RATE: 56 BPM | BODY MASS INDEX: 23.84 KG/M2

## 2024-07-28 DIAGNOSIS — J06.9 UPPER RESPIRATORY TRACT INFECTION, UNSPECIFIED TYPE: Primary | ICD-10-CM

## 2024-07-28 PROCEDURE — 99213 OFFICE O/P EST LOW 20 MIN: CPT | Performed by: PHYSICIAN ASSISTANT

## 2024-07-28 NOTE — PROGRESS NOTES
SUBJECTIVE:   Neptali Moore is a 74 year old male presenting with a chief complaint of cough.  Onset of symptoms was 2 day(s) ago.  Course of illness is same.    Severity moderate  Current and Associated symptoms: runny nose and sore throat  Treatment measures tried include OTC Cough med.  Predisposing factors include None.    Past Medical History:   Diagnosis Date    Arthritis     Depressive disorder see chart     Current Outpatient Medications   Medication Sig Dispense Refill    buPROPion (WELLBUTRIN XL) 150 MG 24 hr tablet Take 1 tablet (150 mg) by mouth every morning 90 tablet 3    buPROPion (WELLBUTRIN XL) 300 MG 24 hr tablet Take 1 tablet (300 mg) by mouth every morning 90 tablet 3    gabapentin (NEURONTIN) 300 MG capsule Take 1 capsule (300 mg) by mouth every morning AND 1 capsule (300 mg) daily (with lunch) AND 3 capsules (900 mg) at bedtime. 450 capsule 3    triamcinolone (KENALOG) 0.1 % external ointment Apply topically 2 times daily (Patient not taking: Reported on 7/28/2024) 30 g 0     Social History     Tobacco Use    Smoking status: Never    Smokeless tobacco: Never   Substance Use Topics    Alcohol use: Yes     Alcohol/week: 3.0 - 6.0 standard drinks of alcohol     Types: 1 - 2 Glasses of wine, 1 - 2 Cans of beer, 1 - 2 Shots of liquor per week     Comment: 2-3 drinks/week       ROS:  Review of systems negative except as stated above.    OBJECTIVE:  /73   Pulse 56   Temp 97.9  F (36.6  C) (Temporal)   Ht 1.829 m (6')   Wt 79.8 kg (176 lb)   SpO2 97%   BMI 23.87 kg/m    GENERAL APPEARANCE: healthy, alert and no distress  EYES: EOMI,  PERRL, conjunctiva clear  HENT: ear canals and TM's normal.  Nose and mouth without ulcers, erythema or lesions  NECK: supple, nontender, no lymphadenopathy  RESP: lungs clear to auscultation - no rales, rhonchi or wheezes  CV: regular rates and rhythm, normal S1 S2, no murmur noted  NEURO: Normal strength and tone, sensory exam grossly normal,  normal  speech and mentation  SKIN: no suspicious lesions or rashes    ASSESSMENT:  (J06.9) Upper respiratory tract infection, unspecified type  (primary encounter diagnosis)  Comment: no evidence of bacterial component or respiratory distress  Plan: rest, fluids, mucolytics

## 2024-09-16 ENCOUNTER — MYC REFILL (OUTPATIENT)
Dept: FAMILY MEDICINE | Facility: CLINIC | Age: 74
End: 2024-09-16
Payer: COMMERCIAL

## 2024-09-16 DIAGNOSIS — F32.1 CURRENT MODERATE EPISODE OF MAJOR DEPRESSIVE DISORDER WITHOUT PRIOR EPISODE (H): ICD-10-CM

## 2024-09-16 RX ORDER — BUPROPION HYDROCHLORIDE 300 MG/1
300 TABLET ORAL EVERY MORNING
Qty: 90 TABLET | Refills: 0 | Status: SHIPPED | OUTPATIENT
Start: 2024-09-16

## 2024-09-16 RX ORDER — BUPROPION HYDROCHLORIDE 150 MG/1
150 TABLET ORAL EVERY MORNING
Qty: 90 TABLET | Refills: 0 | Status: SHIPPED | OUTPATIENT
Start: 2024-09-16 | End: 2024-09-30 | Stop reason: DRUGHIGH

## 2024-09-28 ENCOUNTER — MYC MEDICAL ADVICE (OUTPATIENT)
Dept: FAMILY MEDICINE | Facility: CLINIC | Age: 74
End: 2024-09-28
Payer: COMMERCIAL

## 2024-09-28 SDOH — HEALTH STABILITY: PHYSICAL HEALTH: ON AVERAGE, HOW MANY DAYS PER WEEK DO YOU ENGAGE IN MODERATE TO STRENUOUS EXERCISE (LIKE A BRISK WALK)?: 3 DAYS

## 2024-09-28 SDOH — HEALTH STABILITY: PHYSICAL HEALTH: ON AVERAGE, HOW MANY MINUTES DO YOU ENGAGE IN EXERCISE AT THIS LEVEL?: 60 MIN

## 2024-09-28 ASSESSMENT — SOCIAL DETERMINANTS OF HEALTH (SDOH): HOW OFTEN DO YOU GET TOGETHER WITH FRIENDS OR RELATIVES?: ONCE A WEEK

## 2024-09-30 ENCOUNTER — OFFICE VISIT (OUTPATIENT)
Dept: FAMILY MEDICINE | Facility: CLINIC | Age: 74
End: 2024-09-30
Payer: COMMERCIAL

## 2024-09-30 VITALS
WEIGHT: 175 LBS | DIASTOLIC BLOOD PRESSURE: 66 MMHG | HEART RATE: 60 BPM | HEIGHT: 72 IN | SYSTOLIC BLOOD PRESSURE: 118 MMHG | OXYGEN SATURATION: 98 % | RESPIRATION RATE: 18 BRPM | TEMPERATURE: 97.9 F | BODY MASS INDEX: 23.7 KG/M2

## 2024-09-30 DIAGNOSIS — Z13.220 SCREENING FOR HYPERLIPIDEMIA: ICD-10-CM

## 2024-09-30 DIAGNOSIS — Z00.00 ENCOUNTER FOR MEDICARE ANNUAL WELLNESS EXAM: Primary | ICD-10-CM

## 2024-09-30 DIAGNOSIS — F32.1 CURRENT MODERATE EPISODE OF MAJOR DEPRESSIVE DISORDER WITHOUT PRIOR EPISODE (H): ICD-10-CM

## 2024-09-30 DIAGNOSIS — N39.43 POST-VOID DRIBBLING: ICD-10-CM

## 2024-09-30 DIAGNOSIS — Z12.5 SCREENING PSA (PROSTATE SPECIFIC ANTIGEN): ICD-10-CM

## 2024-09-30 DIAGNOSIS — G40.009 PARTIAL IDIOPATHIC EPILEPSY WITH SEIZURES OF LOCALIZED ONSET, NOT INTRACTABLE, WITHOUT STATUS EPILEPTICUS (H): ICD-10-CM

## 2024-09-30 DIAGNOSIS — J33.9 NASAL POLYP: ICD-10-CM

## 2024-09-30 LAB
ALBUMIN SERPL BCG-MCNC: 4.1 G/DL (ref 3.5–5.2)
ALP SERPL-CCNC: 54 U/L (ref 40–150)
ALT SERPL W P-5'-P-CCNC: 12 U/L (ref 0–70)
ANION GAP SERPL CALCULATED.3IONS-SCNC: 11 MMOL/L (ref 7–15)
AST SERPL W P-5'-P-CCNC: 16 U/L (ref 0–45)
BILIRUB SERPL-MCNC: 0.7 MG/DL
BUN SERPL-MCNC: 22.4 MG/DL (ref 8–23)
CALCIUM SERPL-MCNC: 8.8 MG/DL (ref 8.8–10.4)
CHLORIDE SERPL-SCNC: 105 MMOL/L (ref 98–107)
CHOLEST SERPL-MCNC: 168 MG/DL
CREAT SERPL-MCNC: 1.1 MG/DL (ref 0.67–1.17)
EGFRCR SERPLBLD CKD-EPI 2021: 70 ML/MIN/1.73M2
ERYTHROCYTE [DISTWIDTH] IN BLOOD BY AUTOMATED COUNT: 13.2 % (ref 10–15)
FASTING STATUS PATIENT QL REPORTED: NO
FASTING STATUS PATIENT QL REPORTED: NO
GLUCOSE SERPL-MCNC: 88 MG/DL (ref 70–99)
HCO3 SERPL-SCNC: 25 MMOL/L (ref 22–29)
HCT VFR BLD AUTO: 42.7 % (ref 40–53)
HDLC SERPL-MCNC: 65 MG/DL
HGB BLD-MCNC: 13.7 G/DL (ref 13.3–17.7)
LDLC SERPL CALC-MCNC: 92 MG/DL
MCH RBC QN AUTO: 31.1 PG (ref 26.5–33)
MCHC RBC AUTO-ENTMCNC: 32.1 G/DL (ref 31.5–36.5)
MCV RBC AUTO: 97 FL (ref 78–100)
NONHDLC SERPL-MCNC: 103 MG/DL
PLATELET # BLD AUTO: 315 10E3/UL (ref 150–450)
POTASSIUM SERPL-SCNC: 4.4 MMOL/L (ref 3.4–5.3)
PROT SERPL-MCNC: 6.4 G/DL (ref 6.4–8.3)
PSA SERPL DL<=0.01 NG/ML-MCNC: 0.39 NG/ML (ref 0–6.5)
RBC # BLD AUTO: 4.41 10E6/UL (ref 4.4–5.9)
SODIUM SERPL-SCNC: 141 MMOL/L (ref 135–145)
TRIGL SERPL-MCNC: 55 MG/DL
WBC # BLD AUTO: 5.9 10E3/UL (ref 4–11)

## 2024-09-30 PROCEDURE — 99214 OFFICE O/P EST MOD 30 MIN: CPT | Mod: 25 | Performed by: FAMILY MEDICINE

## 2024-09-30 PROCEDURE — G0439 PPPS, SUBSEQ VISIT: HCPCS | Performed by: FAMILY MEDICINE

## 2024-09-30 PROCEDURE — 85027 COMPLETE CBC AUTOMATED: CPT | Performed by: FAMILY MEDICINE

## 2024-09-30 PROCEDURE — 80061 LIPID PANEL: CPT | Performed by: FAMILY MEDICINE

## 2024-09-30 PROCEDURE — G0103 PSA SCREENING: HCPCS | Performed by: FAMILY MEDICINE

## 2024-09-30 PROCEDURE — 80053 COMPREHEN METABOLIC PANEL: CPT | Performed by: FAMILY MEDICINE

## 2024-09-30 PROCEDURE — 36415 COLL VENOUS BLD VENIPUNCTURE: CPT | Performed by: FAMILY MEDICINE

## 2024-09-30 ASSESSMENT — PATIENT HEALTH QUESTIONNAIRE - PHQ9
10. IF YOU CHECKED OFF ANY PROBLEMS, HOW DIFFICULT HAVE THESE PROBLEMS MADE IT FOR YOU TO DO YOUR WORK, TAKE CARE OF THINGS AT HOME, OR GET ALONG WITH OTHER PEOPLE: NOT DIFFICULT AT ALL
SUM OF ALL RESPONSES TO PHQ QUESTIONS 1-9: 3
SUM OF ALL RESPONSES TO PHQ QUESTIONS 1-9: 3

## 2024-09-30 ASSESSMENT — PAIN SCALES - GENERAL: PAINLEVEL: NO PAIN (0)

## 2024-09-30 NOTE — PROGRESS NOTES
Preventive Care Visit  [facility]  Mark Dexter Jones MD, MD, Family Medicine  Sep 30, 2024      Assessment & Plan     Encounter for Medicare annual wellness exam       Partial idiopathic epilepsy with seizures of localized onset, not intractable, without status epilepticus (H)  Seeing neurologist.   - CBC with platelets; Future  - CBC with platelets    Current moderate episode of major depressive disorder without prior episode (H)  Currently on 450 mg of Wellbutrin.  Wellbutrin tends to lower down seizure threshold.  I am not sure how much it has effect on partial epilepsy but we agreed to cut down to 300 mg and I will send a message to his neurologist for further recommendations.  - Comprehensive metabolic panel; Future  - Comprehensive metabolic panel    Post-void dribbling  Mild symptoms could be from BPH versus atonic bladder.  Interested in watchful waiting for now.  Constipation, avoiding bladder irritant discussed.    Nasal polyp  Snoring.  Been to ENT in the past.  - Adult ENT  Referral; Future    Screening for hyperlipidemia     - Lipid panel reflex to direct LDL Fasting; Future  - Lipid panel reflex to direct LDL Fasting    Screening PSA (prostate specific antigen)     - PSA, screen; Future  - PSA, screen            Counseling  Appropriate preventive services were addressed with this patient via screening, questionnaire, or discussion as appropriate for fall prevention, nutrition, physical activity, Tobacco-use cessation, social engagement, weight loss and cognition.  Checklist reviewing preventive services available has been given to the patient.  Reviewed patient's diet, addressing concerns and/or questions.   He is at risk for lack of exercise and has been provided with information to increase physical activity for the benefit of his well-being.   The patient was instructed to see the dentist every 6 months.   Updated plan of care.  Patient reported difficulty  with activities of daily living were addressed today.Patient reported safety concerns were addressed today.The patient was provided with written information regarding signs of hearing loss.   Information on urinary incontinence and treatment options given to patient.           Arline Gunderson is a 74 year old, presenting for the following:  Medicare Visit        9/30/2024     8:55 AM   Additional Questions   Roomed by Sue tena         Health Care Directive  Patient has a Health Care Directive on file  Advance care planning document is on file and is current.    HPI    Feeling OK physically.     Some anxiety present. Lots of changes in health.     Oct 23 - seizure diagnosis. Been to neurologist and diagnosed with partial idiopathic epilepsy.   Gets aura (smell) before episodes.   300-600-600 gabapentin dose.   Since then several episodes. Often difference and milder.   2 weeks ago last episode.   Neuropsych evaluation this summer.   Driving - due to focal episodes OK to drive as per neuro.     Depression - mood is stable or better. Wondering about wellbutrin and seizures.      Urinary frequency- bladder muscles are more relaxed. Not much residual urine. Some dripping after urination.     Nasal polyp as per dentist. Also some snoring, mild hearing loss and needing hearing aids.          9/28/2024   General Health   How would you rate your overall physical health? (!) FAIR   Feel stress (tense, anxious, or unable to sleep) To some extent      (!) STRESS CONCERN      9/28/2024   Nutrition   Diet: Vegetarian/vegan            9/28/2024   Exercise   Days per week of moderate/strenous exercise 3 days   Average minutes spent exercising at this level 60 min            9/28/2024   Social Factors   Frequency of gathering with friends or relatives Once a week   Worry food won't last until get money to buy more No   Food not last or not have enough money for food? No   Do you have housing? (Housing is defined as stable  permanent housing and does not include staying ouside in a car, in a tent, in an abandoned building, in an overnight shelter, or couch-surfing.) Yes   Are you worried about losing your housing? No   Lack of transportation? No   Unable to get utilities (heat,electricity)? No            9/30/2024   Fall Risk   Gait Speed Test Interpretation Less than or equal to 5.00 seconds - PASS              9/28/2024   Activities of Daily Living- Home Safety   Needs help with the following daily activites Housework   Safety concerns in the home No grab bars in the bathroom            9/28/2024   Dental   Dentist two times every year? (!) NO            9/28/2024   Hearing Screening   Hearing concerns? (!) IT'S HARD TO FOLLOW A CONVERSATION IN A NOISY RESTAURANT OR CROWDED ROOM.    (!) TROUBLE UNDERSTANDING SOFT OR WHISPERED SPEECH.       Multiple values from one day are sorted in reverse-chronological order         9/28/2024   Driving Risk Screening   Patient/family members have concerns about driving No            9/28/2024   General Alertness/Fatigue Screening   Have you been more tired than usual lately? No            9/28/2024   Urinary Incontinence Screening   Bothered by leaking urine in past 6 months Yes            9/28/2024   TB Screening   Were you born outside of the [place]? No          Today's PHQ-9 Score:       9/30/2024     8:45 AM   PHQ-9 SCORE   PHQ-9 Total Score MyChart 3 (Minimal depression)   PHQ-9 Total Score 3         9/28/2024   Substance Use   Alcohol more than 3/day or more than 7/wk No   Do you have a current opioid prescription? No   How severe/bad is pain from 1 to 10? 1/10   Do you use any other substances recreationally? No        Social History     Tobacco Use    Smoking status: Never    Smokeless tobacco: Never   Vaping Use    Vaping status: Never Used   Substance Use Topics    Alcohol use: Yes     Alcohol/week: 3.0 - 6.0 standard drinks of alcohol     Types: 1 - 2 Glasses of wine, 1 - 2 Cans of beer, 1 -  2 Shots of liquor per week     Comment: 2-3 drinks/week    Drug use: No           9/28/2024   AAA Screening   Family history of Abdominal Aortic Aneurysm (AAA)? No      ASCVD Risk   The 10-year ASCVD risk score (Hubert MURILLO, et al., 2019) is: 17.4%    Values used to calculate the score:      Age: 74 years      Sex: Male      Is Non- : No      Diabetic: No      Tobacco smoker: No      Systolic Blood Pressure: 118 mmHg      Is BP treated: No      HDL Cholesterol: 65 mg/dL      Total Cholesterol: 161 mg/dL            Reviewed and updated as needed this visit by Provider                      Current providers sharing in care for this patient include:  Patient Care Team:  Mark Jones MD as PCP - General (Family Practice)  Oliva Youngblood MD as MD (Gastroenterology)  Mark Jones MD as Assigned PCP  Bhavna Trevizo AuD as Audiologist (Audiology)  Carol Ann Mendoza NP as Nurse Practitioner  Carol Ann Menodza NP as Assigned Surgical Provider  Sergio Zurita MD as Assigned Neuroscience Provider  Deon Bolton, PhD LP as Assigned Behavioral Health Provider    The following health maintenance items are reviewed in Epic and correct as of today:  Health Maintenance   Topic Date Due    DTAP/TDAP/TD IMMUNIZATION (2 - Td or Tdap) 06/05/2023    ANNUAL REVIEW OF HM ORDERS  09/27/2024    MEDICARE ANNUAL WELLNESS VISIT  09/27/2024    RSV VACCINE (1 - 1-dose 75+ series) 02/20/2025    PHQ-9  03/30/2025    FALL RISK ASSESSMENT  09/30/2025    COLORECTAL CANCER SCREENING  09/07/2026    GLUCOSE  09/27/2026    LIPID  09/27/2028    ADVANCE CARE PLANNING  09/27/2028    HEPATITIS C SCREENING  Completed    DEPRESSION ACTION PLAN  Completed    INFLUENZA VACCINE  Completed    Pneumococcal Vaccine: 65+ Years  Completed    ZOSTER IMMUNIZATION  Completed    COVID-19 Vaccine  Completed    HPV IMMUNIZATION  Aged Out    MENINGITIS IMMUNIZATION  Aged Out    RSV MONOCLONAL ANTIBODY   Aged Out            Objective    Exam  /66 (BP Location: Right arm, Patient Position: Sitting, Cuff Size: Adult Regular)   Pulse 60   Temp 97.9  F (36.6  C) (Temporal)   Resp 18   Ht 1.829 m (6')   Wt 79.4 kg (175 lb)   SpO2 98%   BMI 23.73 kg/m     Estimated body mass index is 23.73 kg/m  as calculated from the following:    Height as of this encounter: 1.829 m (6').    Weight as of this encounter: 79.4 kg (175 lb).    Physical Exam  GENERAL: alert and no distress  EYES: Eyes grossly normal to inspection, PERRL and conjunctivae and sclerae normal  HENT: ear canals and TM's normal, nose and mouth without ulcers or lesions  NECK: no adenopathy, no asymmetry, masses, or scars  RESP: lungs clear to auscultation - no rales, rhonchi or wheezes  CV: regular rate and rhythm, normal S1 S2, no S3 or S4, no murmur, click or rub, no peripheral edema  ABDOMEN: soft, nontender, no hepatosplenomegaly, no masses and bowel sounds normal  MS: no gross musculoskeletal defects noted, no edema  SKIN: no suspicious lesions or rashes  NEURO: Normal strength and tone, mentation intact and speech normal  PSYCH: mentation appears normal, affect normal/bright        9/30/2024   Mini Cog   Clock Draw Score 2 Normal   3 Item Recall 3 objects recalled   Mini Cog Total Score 5                 Signed Electronically by: Mark Jones MD, MD    Answers submitted by the patient for this visit:  Patient Health Questionnaire (Submitted on 9/30/2024)  If you checked off any problems, how difficult have these problems made it for you to do your work, take care of things at home, or get along with other people?: Not difficult at all  PHQ9 TOTAL SCORE: 3

## 2024-09-30 NOTE — PATIENT INSTRUCTIONS
Patient Education   Preventive Care Advice   This is general advice given by our system to help you stay healthy. However, your care team may have specific advice just for you. Please talk to your care team about your preventive care needs.  Nutrition  Eat 5 or more servings of fruits and vegetables each day.  Try wheat bread, brown rice and whole grain pasta (instead of white bread, rice, and pasta).  Get enough calcium and vitamin D. Check the label on foods and aim for 100% of the RDA (recommended daily allowance).  Lifestyle  Exercise at least 150 minutes each week  (30 minutes a day, 5 days a week).  Do muscle strengthening activities 2 days a week. These help control your weight and prevent disease.  No smoking.  Wear sunscreen to prevent skin cancer.  Have a dental exam and cleaning every 6 months.  Yearly exams  See your health care team every year to talk about:  Any changes in your health.  Any medicines your care team has prescribed.  Preventive care, family planning, and ways to prevent chronic diseases.  Shots (vaccines)   HPV shots (up to age 26), if you've never had them before.  Hepatitis B shots (up to age 59), if you've never had them before.  COVID-19 shot: Get this shot when it's due.  Flu shot: Get a flu shot every year.  Tetanus shot: Get a tetanus shot every 10 years.  Pneumococcal, hepatitis A, and RSV shots: Ask your care team if you need these based on your risk.  Shingles shot (for age 50 and up)  General health tests  Diabetes screening:  Starting at age 35, Get screened for diabetes at least every 3 years.  If you are younger than age 35, ask your care team if you should be screened for diabetes.  Cholesterol test: At age 39, start having a cholesterol test every 5 years, or more often if advised.  Bone density scan (DEXA): At age 50, ask your care team if you should have this scan for osteoporosis (brittle bones).  Hepatitis C: Get tested at least once in your life.  STIs (sexually  transmitted infections)  Before age 24: Ask your care team if you should be screened for STIs.  After age 24: Get screened for STIs if you're at risk. You are at risk for STIs (including HIV) if:  You are sexually active with more than one person.  You don't use condoms every time.  You or a partner was diagnosed with a sexually transmitted infection.  If you are at risk for HIV, ask about PrEP medicine to prevent HIV.  Get tested for HIV at least once in your life, whether you are at risk for HIV or not.  Cancer screening tests  Cervical cancer screening: If you have a cervix, begin getting regular cervical cancer screening tests starting at age 21.  Breast cancer scan (mammogram): If you've ever had breasts, begin having regular mammograms starting at age 40. This is a scan to check for breast cancer.  Colon cancer screening: It is important to start screening for colon cancer at age 45.  Have a colonoscopy test every 10 years (or more often if you're at risk) Or, ask your provider about stool tests like a FIT test every year or Cologuard test every 3 years.  To learn more about your testing options, visit:   .  For help making a decision, visit:   https://bit.ly/im73860.  Prostate cancer screening test: If you have a prostate, ask your care team if a prostate cancer screening test (PSA) at age 55 is right for you.  Lung cancer screening: If you are a current or former smoker ages 50 to 80, ask your care team if ongoing lung cancer screenings are right for you.  For informational purposes only. Not to replace the advice of your health care provider. Copyright   2023 [facility]. All rights reserved. Clinically reviewed by the [facility] Transitions Program. It[employer] 028051 - REV 01/24.  Learning About Activities of Daily Living  What are activities of daily living?     Activities of daily living (ADLs) are the basic self-care tasks you do every day. These include eating, bathing, dressing,  and moving around.  As you age, and if you have health problems, you may find that it's harder to do some of these tasks. If so, your doctor can suggest ideas that may help.  To measure what kind of help you may need, your doctor will ask how well you are able to do ADLs. Let your doctor know if there are any tasks that you are having trouble doing. This is an important first step to getting help. And when you have the help you need, you can stay as independent as possible.  How will a doctor assess your ADLs?  Asking about ADLs is part of a routine health checkup your doctor will likely do as you age. Your health check might be done in a doctor's office, in your home, or at a hospital. The goal is to find out if you are having any problems that could make it hard to care for yourself or that make it unsafe for you to be on your own.  To measure your ADLs, your doctor will ask how hard it is for you to do routine tasks. Your doctor may also want to know if you have changed the way you do a task because of a health problem. Your doctor may watch how you:  Walk back and forth.  Keep your balance while you stand or walk.  Move from sitting to standing or from a bed to a chair.  Button or unbutton a shirt or sweater.  Remove and put on your shoes.  It's common to feel a little worried or anxious if you find you can't do all the things you used to be able to do. Talking with your doctor about ADLs is a way to make sure you're as safe as possible and able to care for yourself as well as you can. You may want to bring a caregiver, friend, or family member to your checkup. They can help you talk to your doctor.  Follow-up care is a key part of your treatment and safety. Be sure to make and go to all appointments, and call your doctor if you are having problems. It's also a good idea to know your test results and keep a list of the medicines you take.  Current as of: October 24, 2023  Content Version: 14.2 2024 [facility]  [employer].   Care instructions adapted under license by your healthcare professional. If you have questions about a medical condition or this instruction, always ask your healthcare professional. [employer] disclaims any warranty or liability for your use of this information.    Preventing Falls: Care Instructions  Injuries and health problems such as trouble walking or poor eyesight can increase your risk of falling. So can some medicines. But there are things you can do to help prevent falls. You can exercise to get stronger. You can also arrange your home to make it safer.    Talk to your doctor about the medicines you take. Ask if any of them increase the risk of falls and whether they can be changed or stopped.   Try to exercise regularly. It can help improve your strength and balance. This can help lower your risk of falling.     Practice fall safety and prevention.    Wear low-heeled shoes that fit well and give your feet good support. Talk to your doctor if you have foot problems that make this hard.  Carry a cellphone or wear a medical alert device that you can use to call for help.  Use stepladders instead of chairs to reach high objects. Don't climb if you're at risk for falls. Ask for help, if needed.  Wear the correct eyeglasses, if you need them.    Make your home safer.    Remove rugs, cords, clutter, and furniture from walkways.  Keep your house well lit. Use night-lights in hallways and bathrooms.  Install and use sturdy handrails on stairways.  Wear nonskid footwear, even inside. Don't walk barefoot or in socks without shoes.    Be safe outside.    Use handrails, curb cuts, and ramps whenever possible.  Keep your hands free by using a shoulder bag or backpack.  Try to walk in well-lit areas. Watch out for uneven ground, changes in pavement, and debris.  Be careful in the winter. Walk on the grass or gravel when sidewalks are slippery. Use de-icer on steps and walkways. Add  "non-slip devices to shoes.    Put grab bars and nonskid mats in your shower or tub and near the toilet. Try to use a shower chair or bath bench when bathing.   Get into a tub or shower by putting in your weaker leg first. Get out with your strong side first. Have a phone or medical alert device in the bathroom with you.   Where can you learn more?  Go to https://www.ElephantDrive.[employer]/patiented  Enter G117 in the search box to learn more about \"Preventing Falls: Care Instructions.\"  Current as of: July 17, 2023               Content Version: 14.0    6486-8249 [employer].   Care instructions adapted under license by your healthcare professional. If you have questions about a medical condition or this instruction, always ask your healthcare professional. [employer] disclaims any warranty or liability for your use of this information.      Hearing Loss: Care Instructions  Overview     Hearing loss is a sudden or slow decrease in how well you hear. It can range from slight to profound. Permanent hearing loss can occur with aging. It also can happen when you are exposed long-term to loud noise. Examples include listening to loud music, riding motorcycles, or being around other loud machines.  Hearing loss can affect your work and home life. It can make you feel lonely or depressed. You may feel that you have lost your independence. But hearing aids and other devices can help you hear better and feel connected to others.  Follow-up care is a key part of your treatment and safety. Be sure to make and go to all appointments, and call your doctor if you are having problems. It's also a good idea to know your test results and keep a list of the medicines you take.  How can you care for yourself at home?  Avoid loud noises whenever possible. This helps keep your hearing from getting worse.  Always wear hearing protection around loud noises.  Wear a hearing aid as directed.  A professional can help you " "pick a hearing aid that will work best for you.  You can also get hearing aids over the counter for mild to moderate hearing loss.  Have hearing tests as your doctor suggests. They can show whether your hearing has changed. Your hearing aid may need to be adjusted.  Use other devices as needed. These may include:  Telephone amplifiers and hearing aids that can connect to a television, stereo, radio, or microphone.  Devices that use lights or vibrations. These alert you to the doorbell, a ringing telephone, or a baby monitor.  Television closed-captioning. This shows the words at the bottom of the screen. Most new TVs can do this.  TTY (text telephone). This lets you type messages back and forth on the telephone instead of talking or listening. These devices are also called TDD. When messages are typed on the keyboard, they are sent over the phone line to a receiving TTY. The message is shown on a monitor.  Use text messaging, social media, and email if it is hard for you to communicate by telephone.  Try to learn a listening technique called speechreading. It is not lipreading. You pay attention to people's gestures, expressions, posture, and tone of voice. These clues can help you understand what a person is saying. Face the person you are talking to, and have them face you. Make sure the lighting is good. You need to see the other person's face clearly.  Think about counseling if you need help to adjust to your hearing loss.  When should you call for help?  Watch closely for changes in your health, and be sure to contact your doctor if:    You think your hearing is getting worse.     You have new symptoms, such as dizziness or nausea.   Where can you learn more?  Go to https://www.Medlio.net/patiented  Enter R798 in the search box to learn more about \"Hearing Loss: Care Instructions.\"  Current as of: September 27, 2023               Content Version: 14.0    2915-1604 [employer].   Care " instructions adapted under license by your healthcare professional. If you have questions about a medical condition or this instruction, always ask your healthcare professional. [employer] disclaims any warranty or liability for your use of this information.      Bladder Training: Care Instructions  Your Care Instructions     Bladder training is used to treat urge incontinence and stress incontinence. Urge incontinence means that the need to urinate comes on so fast that you can't get to a toilet in time. Stress incontinence means that you leak urine because of pressure on your bladder. For example, it may happen when you laugh, cough, or lift something heavy.  Bladder training can increase how long you can wait before you have to urinate. It can also help your bladder hold more urine. And it can give you better control over the urge to urinate.  It is important to remember that bladder training takes a few weeks to a few months to make a difference. You may not see results right away, but don't give up.  Follow-up care is a key part of your treatment and safety. Be sure to make and go to all appointments, and call your doctor if you are having problems. It's also a good idea to know your test results and keep a list of the medicines you take.  How can you care for yourself at home?  Work with your doctor to come up with a bladder training program that is right for you. You may use one or more of the following methods.  Delayed urination  In the beginning, try to keep from urinating for 5 minutes after you first feel the need to go.  While you wait, take deep, slow breaths to relax. Kegel exercises can also help you delay the need to go to the bathroom.  After some practice, when you can easily wait 5 minutes to urinate, try to wait 10 minutes before you urinate.  Slowly increase the waiting period until you are able to control when you have to urinate.  Scheduled urination  Empty your bladder when you  "first wake up in the morning.  Schedule times throughout the day when you will urinate.  Start by going to the bathroom every hour, even if you don't need to go.  Slowly increase the time between trips to the bathroom.  When you have found a schedule that works well for you, keep doing it.  If you wake up during the night and have to urinate, do it. Apply your schedule to waking hours only.  Kegel exercises  These tighten and strengthen pelvic muscles, which can help you control the flow of urine. (If doing these exercises causes pain, stop doing them and talk with your doctor.) To do Kegel exercises:  Squeeze your muscles as if you were trying not to pass gas. Or squeeze your muscles as if you were stopping the flow of urine. Your belly, legs, and buttocks shouldn't move.  Hold the squeeze for 3 seconds, then relax for 5 to 10 seconds.  Start with 3 seconds, then add 1 second each week until you are able to squeeze for 10 seconds.  Repeat the exercise 10 times a session. Do 3 to 8 sessions a day.  When should you call for help?  Watch closely for changes in your health, and be sure to contact your doctor if:    Your incontinence is getting worse.     You do not get better as expected.   Where can you learn more?  Go to https://www.StartupBlink.net/patiented  Enter V684 in the search box to learn more about \"Bladder Training: Care Instructions.\"  Current as of: November 15, 2023               Content Version: 14.0    2637-9634 [employer].   Care instructions adapted under license by your healthcare professional. If you have questions about a medical condition or this instruction, always ask your healthcare professional. [employer] disclaims any warranty or liability for your use of this information.         "

## 2024-10-02 NOTE — TELEPHONE ENCOUNTER
FUTURE VISIT INFORMATION      FUTURE VISIT INFORMATION:  Date: 11/6/24  Time: 9:10am  Location: [facility]  REFERRAL INFORMATION:  Referring provider:  Mark Jones MD  Referring providers clinic:  [facility]  Reason for visit/diagnosis  Nasal polyp [J33.9]  REF BY Mark Jones MD  RECS IN EPIC  SCHED W/PT  CONF LOC     RECORDS REQUESTED FROM:       Clinic name Comments Records Status Imaging Status   [facility]  9/30/24- Ov w. Mark Jones MD Epic     Imaging  6/24/24- MR Brain  [facility]  PACS

## 2024-10-19 ENCOUNTER — HOSPITAL ENCOUNTER (EMERGENCY)
Facility: CLINIC | Age: 74
Discharge: HOME | End: 2024-10-19
Attending: EMERGENCY MEDICINE
Payer: COMMERCIAL

## 2024-10-19 VITALS
RESPIRATION RATE: 16 BRPM | WEIGHT: 176 LBS | SYSTOLIC BLOOD PRESSURE: 142 MMHG | HEIGHT: 72 IN | DIASTOLIC BLOOD PRESSURE: 76 MMHG | BODY MASS INDEX: 23.84 KG/M2 | HEART RATE: 57 BPM | TEMPERATURE: 97.9 F | OXYGEN SATURATION: 97 %

## 2024-10-19 DIAGNOSIS — S61.219A LACERATION OF FINGER WITHOUT DAMAGE TO NAIL, FOREIGN BODY PRESENCE UNSPECIFIED, UNSPECIFIED FINGER, UNSPECIFIED LATERALITY, INITIAL ENCOUNTER: ICD-10-CM

## 2024-10-19 PROCEDURE — 12001 RPR S/N/AX/GEN/TRNK 2.5CM/<: CPT | Performed by: EMERGENCY MEDICINE

## 2024-10-19 PROCEDURE — 99283 EMERGENCY DEPT VISIT LOW MDM: CPT | Performed by: EMERGENCY MEDICINE

## 2024-10-19 RX ORDER — LIDOCAINE HYDROCHLORIDE 10 MG/ML
10 INJECTION, SOLUTION INFILTRATION; PERINEURAL ONCE
Status: DISCONTINUED | OUTPATIENT
Start: 2024-10-19 | End: 2024-10-19 | Stop reason: HOSPADM

## 2024-10-19 RX ORDER — ACETAMINOPHEN 500 MG
1000 TABLET ORAL ONCE
Status: DISCONTINUED | OUTPATIENT
Start: 2024-10-19 | End: 2024-10-19 | Stop reason: HOSPADM

## 2024-10-19 ASSESSMENT — COLUMBIA-SUICIDE SEVERITY RATING SCALE - C-SSRS
1. IN THE PAST MONTH, HAVE YOU WISHED YOU WERE DEAD OR WISHED YOU COULD GO TO SLEEP AND NOT WAKE UP?: NO
2. HAVE YOU ACTUALLY HAD ANY THOUGHTS OF KILLING YOURSELF IN THE PAST MONTH?: NO
6. HAVE YOU EVER DONE ANYTHING, STARTED TO DO ANYTHING, OR PREPARED TO DO ANYTHING TO END YOUR LIFE?: NO

## 2024-10-19 ASSESSMENT — ACTIVITIES OF DAILY LIVING (ADL)
ADLS_ACUITY_SCORE: 35
ADLS_ACUITY_SCORE: 35

## 2024-10-20 NOTE — ED TRIAGE NOTES
"  \" Dropped a brick on my L ring finger there's laceration in there\" Dressing is on and is clean and dry. Denies being on a blood thinner   Triage Assessment (Adult)       Row Name 10/19/24 1928          Triage Assessment    Airway WDL WDL        Respiratory WDL    Respiratory WDL WDL        Skin Circulation/Temperature WDL    Skin Circulation/Temperature WDL X        Cardiac WDL    Cardiac WDL WDL        Peripheral/Neurovascular WDL    Peripheral Neurovascular WDL WDL        Cognitive/Neuro/Behavioral WDL    Cognitive/Neuro/Behavioral WDL WDL                     "

## 2024-10-20 NOTE — DISCHARGE INSTRUCTIONS
Please make an appointment to follow up with Your Primary Care Provider in 8 days for suture removal.

## 2024-10-20 NOTE — ED PROVIDER NOTES
"    [facility] EMERGENCY DEPARTMENT ([facility])    10/19/24      ED PROVIDER NOTE       History     Chief Complaint   Patient presents with    Laceration     \" Dropped a brick on my L ring finger there's laceration in there\" Dressing is on and is clean and dry. Denies being on a blood thinner     HPI  Neptali Moore is a 74 year old male who presents to the ED for evaluation of a left ring finger laceration.     Patient reports he was lifting a brick and it fell, landing on his left ring finger. He presents here for further evaluation.     Past Medical History  Past Medical History:   Diagnosis Date    Arthritis     Depressive disorder see chart     Past Surgical History:   Procedure Laterality Date    BIOPSY  this year    upper endoscopy,skin chest    COLONOSCOPY      biopsies    ESOPHAGOSCOPY, GASTROSCOPY, DUODENOSCOPY (EGD), COMBINED N/A 2019    Procedure: ESOPHAGOGASTRODUODENOSCOPY, WITH BIOPSY;  Surgeon: Oliva Youngblood MD;  Location: [facility] OR    EXTRACTION(S) DENTAL      KNEE SURGERY      bilateral; partial meniscus removal    ORTHOPEDIC SURGERY  3 years ago, decades ago    shoulder, rotator cuff, elbow    SOFT TISSUE SURGERY  20 + years ago    medial meniscus removal (both sides, different times)    TONSILLECTOMY       buPROPion (WELLBUTRIN XL) 300 MG 24 hr tablet  gabapentin (NEURONTIN) 300 MG capsule      No Known Allergies  Family History  Family History   Problem Relation Age of Onset    Dementia Mother     Arthritis Mother     Osteoporosis Mother          at 88    Obesity Mother     Pacemaker Father     Cerebrovascular Disease Father     Obesity Father     Other Cancer Brother         Head and Neck    Depression Other         exasperate reactive    Depression Nephew         likely drug related    Mental Illness Nephew         drug use +    Substance Abuse Nephew     Mental Illness Cousin         drug use +    Mental Illness Brother         drug use +    Substance Abuse " "Brother     Substance Abuse Brother      Social History   Social History     Tobacco Use    Smoking status: Never    Smokeless tobacco: Never   Vaping Use    Vaping status: Never Used   Substance Use Topics    Alcohol use: Yes     Alcohol/week: 3.0 - 6.0 standard drinks of alcohol     Types: 1 - 2 Glasses of wine, 1 - 2 Cans of beer, 1 - 2 Shots of liquor per week     Comment: 2-3 drinks/week    Drug use: No      Past medical history, past surgical history, medications, allergies, family history, and social history were reviewed with the patient. No additional pertinent items.     A complete review of systems was performed with pertinent positives and negatives noted in the HPI, and all other systems negative.    Physical Exam   BP: (!) 142/76  Pulse: 57  Temp: 97.9  F (36.6  C)  Resp: 16  Height: 182.9 cm (6')  Weight: 79.8 kg (176 lb)  SpO2: 97 %  Physical Exam  ***    ED Course, Procedures, & Data      Procedures       {ED Course Selections (Optional):621401}  {ED Sepsis CMS Documentation (Optional):810195::\" \"}       No results found for any visits on 10/19/24.  Medications - No data to display  Labs Ordered and Resulted from Time of ED Arrival to Time of ED Departure - No data to display  No orders to display          {Critical Care Performed?:805933}    Assessment & Plan    ***    I have reviewed the nursing notes. I have reviewed the findings, diagnosis, plan and need for follow up with the patient.    New Prescriptions    No medications on file       Final diagnoses:   None   IChristel, am serving as a trained medical scribe to document services personally performed by Ashvin Yin MD based on the provider's statements to me on October 19, 2024.  This document has been checked and approved by the attending provider.    I, Ashvin Yin MD, was physically present and have reviewed and verified the accuracy of this note documented by Christel Clifford, medical scribe.      Ashvin Yin MD  M " [facility] EMERGENCY DEPARTMENT  10/19/2024   diagnosis, plan and need for follow up with the patient.    New Prescriptions    No medications on file       Final diagnoses:   Laceration of finger without damage to nail, foreign body presence unspecified, unspecified finger, unspecified laterality, initial encounter   IChristel, am serving as a trained medical scribe to document services personally performed by Ashvin Yin MD based on the provider's statements to me on October 19, 2024.  This document has been checked and approved by the attending provider.    IAshvin MD, was physically present and have reviewed and verified the accuracy of this note documented by Christel Clifford, medical scribe.      Ashvin Yin MD  [facility] EMERGENCY DEPARTMENT  10/19/2024     Ashvin Yin MD  11/23/24 [id number]

## 2024-10-20 NOTE — ED NOTES
RN unable to give tetanus prior to discharge. RN notified patient to request immunization with clinic follow up

## 2024-11-06 ENCOUNTER — PRE VISIT (OUTPATIENT)
Dept: OTOLARYNGOLOGY | Facility: CLINIC | Age: 74
End: 2024-11-06

## 2024-11-06 ENCOUNTER — OFFICE VISIT (OUTPATIENT)
Dept: OTOLARYNGOLOGY | Facility: CLINIC | Age: 74
End: 2024-11-06
Attending: FAMILY MEDICINE
Payer: COMMERCIAL

## 2024-11-06 VITALS
DIASTOLIC BLOOD PRESSURE: 72 MMHG | BODY MASS INDEX: 24.57 KG/M2 | SYSTOLIC BLOOD PRESSURE: 122 MMHG | WEIGHT: 181.4 LBS | HEART RATE: 59 BPM | HEIGHT: 72 IN | OXYGEN SATURATION: 98 %

## 2024-11-06 DIAGNOSIS — R13.10 DYSPHAGIA, UNSPECIFIED TYPE: Primary | ICD-10-CM

## 2024-11-06 DIAGNOSIS — J33.9 NASAL POLYP: ICD-10-CM

## 2024-11-06 PROCEDURE — 31231 NASAL ENDOSCOPY DX: CPT | Performed by: STUDENT IN AN ORGANIZED HEALTH CARE EDUCATION/TRAINING PROGRAM

## 2024-11-06 PROCEDURE — 99203 OFFICE O/P NEW LOW 30 MIN: CPT | Mod: 25 | Performed by: STUDENT IN AN ORGANIZED HEALTH CARE EDUCATION/TRAINING PROGRAM

## 2024-11-06 ASSESSMENT — PAIN SCALES - GENERAL: PAINLEVEL_OUTOF10: NO PAIN (0)

## 2024-11-06 NOTE — PROGRESS NOTES
[facility]  New Patient Visit      Encounter date:   November 6, 2024    Referring Provider:   Mark Jones MD  [address] [place] [address]    Chief Complaint: consult    History of Present Illness:   Neptali Moore is a 74 year old male who presents for consultation regarding a nasal polyp.    9/30/24- Mark Diana MD: His A/P detailed that he was seen for an annual medicare wellness exam. Had a dental visit with an X-ray that was concerning for possible polyp.  He was referred to ENT for further evaluation.    11/6/24: Today, he reports that he was referred by his dentist for a nasal polyp. He otherwise does not have any concerns regarding his sinuses. He reports that he has a little bit of dysphagia and saw ENT years ago for it. He is wondering if this is age related. Comes and goes and usually only for a few minutes. He reports hearing loss and tinnitus, and has hearing aids.     Of note, he is a retired OBGYN.    Review of systems: A 14-point review of systems has been conducted and was negative for any notable symptoms, except as dictated in the history of present illness.     Medical History:  Past Medical History:   Diagnosis Date    Arthritis     Depressive disorder see chart        Surgical History:   Past Surgical History:   Procedure Laterality Date    BIOPSY  this year    upper endoscopy,skin chest    COLONOSCOPY      biopsies    ESOPHAGOSCOPY, GASTROSCOPY, DUODENOSCOPY (EGD), COMBINED N/A 08/06/2019    Procedure: ESOPHAGOGASTRODUODENOSCOPY, WITH BIOPSY;  Surgeon: Oliva Youngblood MD;  Location: [facility] OR    EXTRACTION(S) DENTAL      KNEE SURGERY      bilateral; partial meniscus removal    ORTHOPEDIC SURGERY  3 years ago, decades ago    shoulder, rotator cuff, elbow    SOFT TISSUE SURGERY  20 + years ago    medial meniscus removal (both sides, different times)    TONSILLECTOMY          Family History:  Family History   Problem  Relation Age of Onset    Dementia Mother     Arthritis Mother     Osteoporosis Mother          at 88    Obesity Mother     Pacemaker Father     Cerebrovascular Disease Father     Obesity Father     Other Cancer Brother         Head and Neck    Depression Other         exasperate reactive    Depression Nephew         likely drug related    Mental Illness Nephew         drug use +    Substance Abuse Nephew     Mental Illness Cousin         drug use +    Mental Illness Brother         drug use +    Substance Abuse Brother     Substance Abuse Brother         Social History:   Social History     Socioeconomic History    Marital status:    Tobacco Use    Smoking status: Never    Smokeless tobacco: Never   Vaping Use    Vaping status: Never Used   Substance and Sexual Activity    Alcohol use: Yes     Alcohol/week: 3.0 - 6.0 standard drinks of alcohol     Types: 1 - 2 Glasses of wine, 1 - 2 Cans of beer, 1 - 2 Shots of liquor per week     Comment: 2-3 drinks/week    Drug use: No    Sexual activity: Yes     Partners: Female     Birth control/protection: Post-menopausal, Male Surgical   Other Topics Concern    Parent/sibling w/ CABG, MI or angioplasty before 65F 55M? No     Social Drivers of Health     Financial Resource Strain: Low Risk  (2024)    Financial Resource Strain     Within the past 12 months, have you or your family members you live with been unable to get utilities (heat, electricity) when it was really needed?: No   Food Insecurity: Low Risk  (2024)    Food Insecurity     Within the past 12 months, did you worry that your food would run out before you got money to buy more?: No     Within the past 12 months, did the food you bought just not last and you didn t have money to get more?: No   Transportation Needs: Low Risk  (2024)    Transportation Needs     Within the past 12 months, has lack of transportation kept you from medical appointments, getting your medicines, non-medical  meetings or appointments, work, or from getting things that you need?: No   Physical Activity: Sufficiently Active (9/28/2024)    Exercise Vital Sign     Days of Exercise per Week: 3 days     Minutes of Exercise per Session: 60 min   Stress: Stress Concern Present (9/28/2024)    [place] [employer] of [employer] - Occupational Stress Questionnaire     Feeling of Stress : To some extent   Social Connections: Unknown (9/28/2024)    Social Connection and Isolation Panel [NHANES]     Frequency of Social Gatherings with Friends and Family: Once a week   Interpersonal Safety: Low Risk  (9/30/2024)    Interpersonal Safety     Do you feel physically and emotionally safe where you currently live?: Yes     Within the past 12 months, have you been hit, slapped, kicked or otherwise physically hurt by someone?: No     Within the past 12 months, have you been humiliated or emotionally abused in other ways by your partner or ex-partner?: No   Housing Stability: Low Risk  (9/28/2024)    Housing Stability     Do you have housing? : Yes     Are you worried about losing your housing?: No        Physical Exam:  Vital signs: There were no vitals taken for this visit.   General Appearance: No acute distress, appropriate demeanor, conversant  Eyes: moist conjunctivae; EOMI; pupils symmetric; visual acuity grossly intact; no proptosis  Head: normocephalic; overall symmetric appearance without deformity  Face: overall symmetric without deformity  Ears: Normal appearance of external ear; external meatus normal in appearance  Nose: No external deformity  Oral Cavity/oropharynx: Normal appearance of mucosa  Neck: no palpable lymphadenopathy; thyroid without palpable nodules  Lungs: symmetric chest rise; no wheezing  CV: Good distal perfusion; normal hear rate  Extremities: No deformity  Neurologic Exam: Cranial nerves II-XII are grossly intact; no focal deficit    Procedure Note  Procedure performed: Rigid nasal endoscopy  Indication:  To evaluate for sinonasal pathology not visualized on routine anterior rhinoscopy  Anesthesia: 4% topical lidocaine with 0.05% oxymetazoline  Description of procedure: A 30 degree, 3 mm rigid endoscope was inserted into bilateral nasal cavities and the nasal valves, nasal cavity, middle meatus, sphenoethmoid recess, and nasopharynx were thoroughly evaluated for evidence of obstruction, edema, purulence, polyps and/or mass/lesion.     [place]-Leonardo Endoscopic Scoring System  Endoscopic observation Right Left   Polyps in middle meatus (0 = absent, 1 = restricted to middle meatus, 2 = Beyond middle meatus) 0 0   Discharge (0 = absent, 1 = thin and clear, 2 = thick, purulent) 0 0   Edema (0 = absent, 1 = mild-moderate, 2 = moderate-severe) 0 0   Crusting (0 = absent, 1 = mild-moderate, 2 = moderate-severe) 0 0   Scarring (0= absent, 1 = mild-moderate, 2 = moderate-severe) 0 0   Total 0 0     Findings  RT and LT: Bilateral sinonasal passages patent without evidence of infection, polyps, or mass.    The patient tolerated the procedure well without complication.     Imaging Review:  6/24/24- MR Brain   Impression:  1.  Slightly decreased size of the left mesial temporal lobe and slight obscuration of the hippocampal architecture, as can be seen with mesial temporal sclerosis. However, the left hippocampus is not hyperintense compared to the right side.  2.  No evidence of abnormal enhancement intracranially.    Assessment/Medical Decision Making:  Neptali Moore is a 74 year old male with a potential nasal polyp. Discussed that based on endoscopic exam today, this is likely not a polyp rather an enlarged turbinate.    Plan:  Discussed CT sinus if symptoms worsen or based on patient preference  Discussed letting me know if dysphagia gets worse so that further treatment can be decided at that point. Would do a barium swallow  Follow-up with me prn    Scribe Disclosure:   I, Adeline Sutton, am serving as a scribe; to document  services personally performed by Pepito Bray MD -based on data collection and the provider's statements to me.     Provider Disclosure:  I agree with above History, Review of Systems, Physical exam and Plan.  I have reviewed the content of the documentation and have edited it as needed. I have personally performed the services documented here and the documentation accurately represents those services and the decisions I have made.      Electronically signed by:    Pepito Bray MD    [facility]  Rhinology  Endoscopic Skull Base Surgery  [facility]  Department of Otolaryngology - Head & Neck Surgery

## 2024-11-06 NOTE — NURSING NOTE
Chief Complaint   Patient presents with    Consult   Blood pressure 122/72, pulse 59, height 1.829 m (6'), weight 82.3 kg (181 lb 6.4 oz), SpO2 98%. Shawn Huston, EMT

## 2024-11-06 NOTE — PATIENT INSTRUCTIONS
You were seen in the ENT Clinic today by Dr. Bray. If you have any questions or concerns after your appointment, please contact us (see below)       2.   Plan to return to the ENT clinic as needed.           How to Contact Us:  Send a SpaceIL message to your provider. Our team will respond to you via SpaceIL. Occasionally, we will need to call you to get further information.  For urgent matters (Monday-Friday), call the ENT Clinic: [phone] and speak with a call center team member - they will route your call appropriately.   If you'd like to speak directly with a nurse, please find our contact information below. We do our best to check voicemail frequently throughout the day, and will work to call you back within 1-2 days. For urgent matters, please use the general clinic phone numbers listed above.     Kacie ARELLANO RN  Direct: [phone]  Tracie GRAY LPN  Direct: [phone]         [facility]  Department of Otolaryngology

## 2024-11-06 NOTE — LETTER
11/6/2024       RE: Neptali Moore  [address] [facility] [address]     Dear Colleague,    Thank you for referring your patient, Neptali Moore, to the [facility] [place] [facility]. Please see a copy of my visit note below.      [facility]  New Patient Visit      Encounter date:   November 6, 2024    Referring Provider:   Mark Jones MD  [address]  [place] [address]    Chief Complaint: consult    History of Present Illness:   Neptali Moore is a 74 year old male who presents for consultation regarding a nasal polyp.    9/30/24- Ov Mark Sawyer MD: His A/P detailed that he was seen for an annual medicare wellness exam. Had a dental visit with an X-ray that was concerning for possible polyp.  He was referred to ENT for further evaluation.    11/6/24: Today, he reports that he was referred by his dentist for a nasal polyp. He otherwise does not have any concerns regarding his sinuses. He reports that he has a little bit of dysphagia and saw ENT years ago for it. He is wondering if this is age related. Comes and goes and usually only for a few minutes. He reports hearing loss and tinnitus, and has hearing aids.     Of note, he is a retired OBGYN.    Review of systems: A 14-point review of systems has been conducted and was negative for any notable symptoms, except as dictated in the history of present illness.     Medical History:  Past Medical History:   Diagnosis Date     Arthritis      Depressive disorder see chart        Surgical History:   Past Surgical History:   Procedure Laterality Date     BIOPSY  this year    upper endoscopy,skin chest     COLONOSCOPY      biopsies     ESOPHAGOSCOPY, GASTROSCOPY, DUODENOSCOPY (EGD), COMBINED N/A 08/06/2019    Procedure: ESOPHAGOGASTRODUODENOSCOPY, WITH BIOPSY;  Surgeon: Oliva Youngblood MD;  Location:  [facility] OR     EXTRACTION(S) DENTAL       KNEE SURGERY      bilateral; partial meniscus removal     ORTHOPEDIC SURGERY  3 years ago, decades ago    shoulder, rotator cuff, elbow     SOFT TISSUE SURGERY  20 + years ago    medial meniscus removal (both sides, different times)     TONSILLECTOMY          Family History:  Family History   Problem Relation Age of Onset     Dementia Mother      Arthritis Mother      Osteoporosis Mother          at 88     Obesity Mother      Pacemaker Father      Cerebrovascular Disease Father      Obesity Father      Other Cancer Brother         Head and Neck     Depression Other         exasperate reactive     Depression Nephew         likely drug related     Mental Illness Nephew         drug use +     Substance Abuse Nephew      Mental Illness Cousin         drug use +     Mental Illness Brother         drug use +     Substance Abuse Brother      Substance Abuse Brother         Social History:   Social History     Socioeconomic History     Marital status:    Tobacco Use     Smoking status: Never     Smokeless tobacco: Never   Vaping Use     Vaping status: Never Used   Substance and Sexual Activity     Alcohol use: Yes     Alcohol/week: 3.0 - 6.0 standard drinks of alcohol     Types: 1 - 2 Glasses of wine, 1 - 2 Cans of beer, 1 - 2 Shots of liquor per week     Comment: 2-3 drinks/week     Drug use: No     Sexual activity: Yes     Partners: Female     Birth control/protection: Post-menopausal, Male Surgical   Other Topics Concern     Parent/sibling w/ CABG, MI or angioplasty before 65F 55M? No     Social Drivers of Health     Financial Resource Strain: Low Risk  (2024)    Financial Resource Strain      Within the past 12 months, have you or your family members you live with been unable to get utilities (heat, electricity) when it was really needed?: No   Food Insecurity: Low Risk  (2024)    Food Insecurity      Within the past 12 months, did you worry that your food  would run out before you got money to buy more?: No      Within the past 12 months, did the food you bought just not last and you didn t have money to get more?: No   Transportation Needs: Low Risk  (9/28/2024)    Transportation Needs      Within the past 12 months, has lack of transportation kept you from medical appointments, getting your medicines, non-medical meetings or appointments, work, or from getting things that you need?: No   Physical Activity: Sufficiently Active (9/28/2024)    Exercise Vital Sign      Days of Exercise per Week: 3 days      Minutes of Exercise per Session: 60 min   Stress: Stress Concern Present (9/28/2024)    [place] of Occupational Health - Occupational Stress Questionnaire      Feeling of Stress : To some extent   Social Connections: Unknown (9/28/2024)    Social Connection and Isolation Panel [NHANES]      Frequency of Social Gatherings with Friends and Family: Once a week   Interpersonal Safety: Low Risk  (9/30/2024)    Interpersonal Safety      Do you feel physically and emotionally safe where you currently live?: Yes      Within the past 12 months, have you been hit, slapped, kicked or otherwise physically hurt by someone?: No      Within the past 12 months, have you been humiliated or emotionally abused in other ways by your partner or ex-partner?: No   Housing Stability: Low Risk  (9/28/2024)    Housing Stability      Do you have housing? : Yes      Are you worried about losing your housing?: No        Physical Exam:  Vital signs: There were no vitals taken for this visit.   General Appearance: No acute distress, appropriate demeanor, conversant  Eyes: moist conjunctivae; EOMI; pupils symmetric; visual acuity grossly intact; no proptosis  Head: normocephalic; overall symmetric appearance without deformity  Face: overall symmetric without deformity  Ears: Normal appearance of external ear; external meatus normal in appearance  Nose: No external deformity  Oral  Cavity/oropharynx: Normal appearance of mucosa  Neck: no palpable lymphadenopathy; thyroid without palpable nodules  Lungs: symmetric chest rise; no wheezing  CV: Good distal perfusion; normal hear rate  Extremities: No deformity  Neurologic Exam: Cranial nerves II-XII are grossly intact; no focal deficit    Procedure Note  Procedure performed: Rigid nasal endoscopy  Indication: To evaluate for sinonasal pathology not visualized on routine anterior rhinoscopy  Anesthesia: 4% topical lidocaine with 0.05% oxymetazoline  Description of procedure: A 30 degree, 3 mm rigid endoscope was inserted into bilateral nasal cavities and the nasal valves, nasal cavity, middle meatus, sphenoethmoid recess, and nasopharynx were thoroughly evaluated for evidence of obstruction, edema, purulence, polyps and/or mass/lesion.     [place]-Leonardo Endoscopic Scoring System  Endoscopic observation Right Left   Polyps in middle meatus (0 = absent, 1 = restricted to middle meatus, 2 = Beyond middle meatus) 0 0   Discharge (0 = absent, 1 = thin and clear, 2 = thick, purulent) 0 0   Edema (0 = absent, 1 = mild-moderate, 2 = moderate-severe) 0 0   Crusting (0 = absent, 1 = mild-moderate, 2 = moderate-severe) 0 0   Scarring (0= absent, 1 = mild-moderate, 2 = moderate-severe) 0 0   Total 0 0     Findings  RT and LT: Bilateral sinonasal passages patent without evidence of infection, polyps, or mass.    The patient tolerated the procedure well without complication.     Imaging Review:  6/24/24- MR Brain   Impression:  1.  Slightly decreased size of the left mesial temporal lobe and slight obscuration of the hippocampal architecture, as can be seen with mesial temporal sclerosis. However, the left hippocampus is not hyperintense compared to the right side.  2.  No evidence of abnormal enhancement intracranially.    Assessment/Medical Decision Making:  Neptali Moore is a 74 year old male with a potential nasal polyp. Discussed that based on endoscopic  exam today, this is likely not a polyp rather an enlarged turbinate.    Plan:  Discussed CT sinus if symptoms worsen or based on patient preference  Discussed letting me know if dysphagia gets worse so that further treatment can be decided at that point. Would do a barium swallow  Follow-up with me prn    Scribe Disclosure:   I, Adeline Sutton, am serving as a scribe; to document services personally performed by Pepito Bray MD -based on data collection and the provider's statements to me.     Provider Disclosure:  I agree with above History, Review of Systems, Physical exam and Plan.  I have reviewed the content of the documentation and have edited it as needed. I have personally performed the services documented here and the documentation accurately represents those services and the decisions I have made.      Electronically signed by:    Pepito Bray MD    [facility]  Rhinology  Endoscopic Skull Base Surgery  [facility]  Department of Otolaryngology - Head & Neck Surgery      Again, thank you for allowing me to participate in the care of your patient.      Sincerely,    Pepito Bray MD

## 2024-12-04 ENCOUNTER — MYC MEDICAL ADVICE (OUTPATIENT)
Dept: FAMILY MEDICINE | Facility: CLINIC | Age: 74
End: 2024-12-04
Payer: COMMERCIAL

## 2024-12-04 NOTE — TELEPHONE ENCOUNTER
Should be able to schedule an appt with Dr Mackey as they are in the same group for epilepsy.  Good idea to cut down wellbutrin further if doing well. He has 150mg rx at home.

## 2024-12-04 NOTE — TELEPHONE ENCOUNTER
Goomeo message sent to patient.  Jyoti MADRID BSN, PHN, RN-[facility]  [phone]

## 2024-12-15 DIAGNOSIS — F32.1 CURRENT MODERATE EPISODE OF MAJOR DEPRESSIVE DISORDER WITHOUT PRIOR EPISODE (H): ICD-10-CM

## 2024-12-15 RX ORDER — BUPROPION HYDROCHLORIDE 300 MG/1
300 TABLET ORAL EVERY MORNING
Qty: 90 TABLET | Refills: 0 | Status: SHIPPED | OUTPATIENT
Start: 2024-12-15

## 2024-12-23 ENCOUNTER — MYC MEDICAL ADVICE (OUTPATIENT)
Dept: FAMILY MEDICINE | Facility: CLINIC | Age: 74
End: 2024-12-23
Payer: COMMERCIAL

## 2024-12-23 NOTE — TELEPHONE ENCOUNTER
"Dr Jones -- please review and advise    \"I am finally getting around to scheduling with Dr Mackey, as you suggested.    Unfortunately, Dr Mackey has left the [facility] group.  What do you suggest?  I have an appointment with Dr Scales in May.  I can get an appointment with another Neurologist about a week later.     As an update; I am continuing to reduce my Wellbutrin with out obvious return of depression symptoms.\"    Layne Thornton RN  [facility]    "

## 2025-01-17 ENCOUNTER — APPOINTMENT (OUTPATIENT)
Dept: CT IMAGING | Facility: CLINIC | Age: 75
End: 2025-01-17
Attending: EMERGENCY MEDICINE
Payer: COMMERCIAL

## 2025-01-17 ENCOUNTER — HOSPITAL ENCOUNTER (EMERGENCY)
Facility: CLINIC | Age: 75
Discharge: HOME | End: 2025-01-17
Attending: EMERGENCY MEDICINE | Admitting: EMERGENCY MEDICINE
Payer: COMMERCIAL

## 2025-01-17 VITALS
HEART RATE: 55 BPM | TEMPERATURE: 97.5 F | RESPIRATION RATE: 16 BRPM | OXYGEN SATURATION: 97 % | DIASTOLIC BLOOD PRESSURE: 82 MMHG | SYSTOLIC BLOOD PRESSURE: 126 MMHG

## 2025-01-17 DIAGNOSIS — S01.81XA LACERATION OF FOREHEAD, INITIAL ENCOUNTER: ICD-10-CM

## 2025-01-17 DIAGNOSIS — S01.01XA SCALP LACERATION, INITIAL ENCOUNTER: ICD-10-CM

## 2025-01-17 LAB
ANION GAP SERPL CALCULATED.3IONS-SCNC: 7 MMOL/L (ref 7–15)
ATRIAL RATE - MUSE: 51 BPM
BASOPHILS # BLD AUTO: 0.1 10E3/UL (ref 0–0.2)
BASOPHILS NFR BLD AUTO: 1 %
BUN SERPL-MCNC: 25.7 MG/DL (ref 8–23)
CALCIUM SERPL-MCNC: 9.2 MG/DL (ref 8.8–10.4)
CHLORIDE SERPL-SCNC: 107 MMOL/L (ref 98–107)
CREAT SERPL-MCNC: 1.14 MG/DL (ref 0.67–1.17)
DIASTOLIC BLOOD PRESSURE - MUSE: NORMAL MMHG
EGFRCR SERPLBLD CKD-EPI 2021: 67 ML/MIN/1.73M2
EOSINOPHIL # BLD AUTO: 0.1 10E3/UL (ref 0–0.7)
EOSINOPHIL NFR BLD AUTO: 2 %
ERYTHROCYTE [DISTWIDTH] IN BLOOD BY AUTOMATED COUNT: 12.9 % (ref 10–15)
GLUCOSE SERPL-MCNC: 113 MG/DL (ref 70–99)
HCO3 SERPL-SCNC: 27 MMOL/L (ref 22–29)
HCT VFR BLD AUTO: 43.7 % (ref 40–53)
HGB BLD-MCNC: 14.6 G/DL (ref 13.3–17.7)
IMM GRANULOCYTES # BLD: 0 10E3/UL
IMM GRANULOCYTES NFR BLD: 1 %
INTERPRETATION ECG - MUSE: NORMAL
LYMPHOCYTES # BLD AUTO: 1 10E3/UL (ref 0.8–5.3)
LYMPHOCYTES NFR BLD AUTO: 16 %
MCH RBC QN AUTO: 31.6 PG (ref 26.5–33)
MCHC RBC AUTO-ENTMCNC: 33.4 G/DL (ref 31.5–36.5)
MCV RBC AUTO: 95 FL (ref 78–100)
MONOCYTES # BLD AUTO: 0.4 10E3/UL (ref 0–1.3)
MONOCYTES NFR BLD AUTO: 6 %
NEUTROPHILS # BLD AUTO: 4.5 10E3/UL (ref 1.6–8.3)
NEUTROPHILS NFR BLD AUTO: 75 %
NRBC # BLD AUTO: 0 10E3/UL
NRBC BLD AUTO-RTO: 0 /100
P AXIS - MUSE: 33 DEGREES
PLATELET # BLD AUTO: 284 10E3/UL (ref 150–450)
POTASSIUM SERPL-SCNC: 4.3 MMOL/L (ref 3.4–5.3)
PR INTERVAL - MUSE: 158 MS
QRS DURATION - MUSE: 96 MS
QT - MUSE: 410 MS
QTC - MUSE: 377 MS
R AXIS - MUSE: 10 DEGREES
RBC # BLD AUTO: 4.62 10E6/UL (ref 4.4–5.9)
SODIUM SERPL-SCNC: 141 MMOL/L (ref 135–145)
SYSTOLIC BLOOD PRESSURE - MUSE: NORMAL MMHG
T AXIS - MUSE: 23 DEGREES
TROPONIN T SERPL HS-MCNC: 8 NG/L
VENTRICULAR RATE- MUSE: 51 BPM
WBC # BLD AUTO: 6 10E3/UL (ref 4–11)

## 2025-01-17 PROCEDURE — 80048 BASIC METABOLIC PNL TOTAL CA: CPT | Performed by: EMERGENCY MEDICINE

## 2025-01-17 PROCEDURE — 99284 EMERGENCY DEPT VISIT MOD MDM: CPT | Performed by: EMERGENCY MEDICINE

## 2025-01-17 PROCEDURE — 12011 RPR F/E/E/N/L/M 2.5 CM/<: CPT | Performed by: EMERGENCY MEDICINE

## 2025-01-17 PROCEDURE — 85004 AUTOMATED DIFF WBC COUNT: CPT | Performed by: EMERGENCY MEDICINE

## 2025-01-17 PROCEDURE — 12001 RPR S/N/AX/GEN/TRNK 2.5CM/<: CPT | Performed by: EMERGENCY MEDICINE

## 2025-01-17 PROCEDURE — 36415 COLL VENOUS BLD VENIPUNCTURE: CPT | Performed by: EMERGENCY MEDICINE

## 2025-01-17 PROCEDURE — 93010 ELECTROCARDIOGRAM REPORT: CPT | Performed by: EMERGENCY MEDICINE

## 2025-01-17 PROCEDURE — 72125 CT NECK SPINE W/O DYE: CPT

## 2025-01-17 PROCEDURE — 93005 ELECTROCARDIOGRAM TRACING: CPT | Performed by: EMERGENCY MEDICINE

## 2025-01-17 PROCEDURE — 70450 CT HEAD/BRAIN W/O DYE: CPT

## 2025-01-17 PROCEDURE — 84484 ASSAY OF TROPONIN QUANT: CPT | Performed by: EMERGENCY MEDICINE

## 2025-01-17 PROCEDURE — 99285 EMERGENCY DEPT VISIT HI MDM: CPT | Mod: 25 | Performed by: EMERGENCY MEDICINE

## 2025-01-17 PROCEDURE — 82565 ASSAY OF CREATININE: CPT | Performed by: EMERGENCY MEDICINE

## 2025-01-17 RX ORDER — LIDOCAINE HYDROCHLORIDE AND EPINEPHRINE 10; 10 MG/ML; UG/ML
30 INJECTION, SOLUTION INFILTRATION; PERINEURAL ONCE
Status: DISCONTINUED | OUTPATIENT
Start: 2025-01-17 | End: 2025-01-17 | Stop reason: HOSPADM

## 2025-01-17 RX ORDER — LIDOCAINE HYDROCHLORIDE 10 MG/ML
INJECTION, SOLUTION EPIDURAL; INFILTRATION; INTRACAUDAL; PERINEURAL
Status: DISCONTINUED
Start: 2025-01-17 | End: 2025-01-17 | Stop reason: HOSPADM

## 2025-01-17 ASSESSMENT — COLUMBIA-SUICIDE SEVERITY RATING SCALE - C-SSRS
6. HAVE YOU EVER DONE ANYTHING, STARTED TO DO ANYTHING, OR PREPARED TO DO ANYTHING TO END YOUR LIFE?: NO
1. IN THE PAST MONTH, HAVE YOU WISHED YOU WERE DEAD OR WISHED YOU COULD GO TO SLEEP AND NOT WAKE UP?: NO
2. HAVE YOU ACTUALLY HAD ANY THOUGHTS OF KILLING YOURSELF IN THE PAST MONTH?: NO

## 2025-01-17 ASSESSMENT — ACTIVITIES OF DAILY LIVING (ADL)
ADLS_ACUITY_SCORE: 41

## 2025-01-17 NOTE — ED PROVIDER NOTES
ED Provider Note  [facility]      History     Chief Complaint   Patient presents with    Head Laceration     Pt fell in bathroom and has a laceration on left forehead and another laceration on back of head.  Pt states he had a seizure type episode.      PHYLICIA Moore is a 74 year old male with history of partial idiopathic epilepsy with seizures who presents to the emergency department for evaluation of head lacerations, fall, and loss of consciousness.  Patient states that this morning, he felt like he may have a seizure.  He states that he did not.  He went in his hot tub.  He subsequently got out of the hot tub and was going to take a shower.  He again felt a lightheadedness and fuzziness suggesting that he may have a seizure.  Patient states that he subsequently lost postural tone and fell and struck his head and sustained 2 lacerations.  He states that he felt if he would have sat down he would have not had an episode.  He states the symptoms are consistent with previous seizures although he did not feel postictal this time.  He notes that he is on gabapentin and speculates that his symptoms may be less intense due to that.  He denies any chest pain or dyspnea.  No abdominal pain.  No nausea or vomiting.  He does complain of a headache.  No photophobia.  No diplopia.  No weakness or numbness.  He denies any abdominal pain.  No nausea or vomiting.  No extremity injury or pain.  Patient states his last tetanus immunization was earlier this year.    Past Medical History  Past Medical History:   Diagnosis Date    Arthritis     Depressive disorder see chart     Past Surgical History:   Procedure Laterality Date    BIOPSY  this year    upper endoscopy,skin chest    COLONOSCOPY      biopsies    ESOPHAGOSCOPY, GASTROSCOPY, DUODENOSCOPY (EGD), COMBINED N/A 08/06/2019    Procedure: ESOPHAGOGASTRODUODENOSCOPY, WITH BIOPSY;  Surgeon: Oliva Youngblood MD;  Location:  OR     EXTRACTION(S) DENTAL      KNEE SURGERY      bilateral; partial meniscus removal    ORTHOPEDIC SURGERY  3 years ago, decades ago    shoulder, rotator cuff, elbow    SOFT TISSUE SURGERY  20 + years ago    medial meniscus removal (both sides, different times)    TONSILLECTOMY       buPROPion (WELLBUTRIN XL) 300 MG 24 hr tablet  gabapentin (NEURONTIN) 300 MG capsule  amoxicillin-clavulanate (AUGMENTIN) 875-125 MG tablet      No Known Allergies  Family History  Family History   Problem Relation Age of Onset    Dementia Mother     Arthritis Mother     Osteoporosis Mother          at 88    Obesity Mother     Pacemaker Father     Cerebrovascular Disease Father     Obesity Father     Other Cancer Brother         Head and Neck    Depression Other         exasperate reactive    Depression Nephew         likely drug related    Mental Illness Nephew         drug use +    Substance Abuse Nephew     Mental Illness Cousin         drug use +    Mental Illness Brother         drug use +    Substance Abuse Brother     Substance Abuse Brother      Social History   Social History     Tobacco Use    Smoking status: Never    Smokeless tobacco: Never   Vaping Use    Vaping status: Never Used   Substance Use Topics    Alcohol use: Yes     Alcohol/week: 3.0 - 6.0 standard drinks of alcohol     Types: 1 - 2 Glasses of wine, 1 - 2 Cans of beer, 1 - 2 Shots of liquor per week     Comment: 2-3 drinks/week    Drug use: No      A medically appropriate review of systems was performed with pertinent positives and negatives noted in the HPI, and all other systems negative.    Physical Exam   BP: 126/82  Pulse: 55  Temp: 97.5  F (36.4  C)  Resp: 16  SpO2: 97 %  Physical Exam  Vitals and nursing note reviewed.   Constitutional:       Appearance: Normal appearance.   HENT:      Head: Normocephalic.        Nose: Nose normal.      Mouth/Throat:      Mouth: Mucous membranes are moist.   Eyes:      Extraocular Movements: Extraocular movements  intact.      Pupils: Pupils are equal, round, and reactive to light.   Cardiovascular:      Rate and Rhythm: Normal rate and regular rhythm.      Pulses: Normal pulses.   Pulmonary:      Effort: Pulmonary effort is normal.      Breath sounds: Normal breath sounds.   Abdominal:      General: Abdomen is flat.      Tenderness: There is no abdominal tenderness.   Musculoskeletal:         General: Normal range of motion.      Cervical back: Normal range of motion and neck supple.   Skin:     General: Skin is warm and dry.   Neurological:      General: No focal deficit present.      Mental Status: He is alert.      Cranial Nerves: No cranial nerve deficit.      Motor: No weakness.      Coordination: Coordination normal.      Gait: Gait normal.   Psychiatric:         Mood and Affect: Mood normal.           ED Course, Procedures, & Data      Procedures            EKG Interpretation:      Interpreted by SHANTAL RANGEL MD, MD  Time reviewed: 0937  Symptoms at time of EKG: syncope   Rhythm: sinus bradycardia  Rate: 51  Axis: normal  Ectopy: none  Conduction: normal  ST Segments/ T Waves: No ST-T wave changes  Q Waves: none  Comparison to prior: Unchanged from 5/26/1999    Clinical Impression: Sinus bradycardia        Results for orders placed or performed during the hospital encounter of 01/17/25   CT Head w/o Contrast     Status: None    Narrative    EXAM: CT CERVICAL SPINE W/O CONTRAST, CT HEAD W/O CONTRAST  LOCATION: [facility]  DATE: 1/17/2025    INDICATION: Pain, trauma.  COMPARISON: None.  TECHNIQUE:   1) Routine CT Head without IV contrast. Multiplanar reformats. Dose reduction techniques were used.  2) Routine CT Cervical Spine without IV contrast. Multiplanar reformats. Dose reduction techniques were used.    FINDINGS:   HEAD CT:   INTRACRANIAL CONTENTS: No intracranial hemorrhage, extraaxial collection, or mass effect.  No CT evidence of acute infarct. Normal parenchymal  attenuation. Normal ventricles and sulci.     VISUALIZED ORBITS/SINUSES/MASTOIDS: No intraorbital abnormality. No paranasal sinus mucosal disease. No middle ear or mastoid effusion.    BONES/SOFT TISSUES: No acute abnormality.    CERVICAL SPINE CT:   VERTEBRA: No acute cervical spine fracture is identified. There is mild anterolisthesis of C7 on T1 measuring 203 mm. Anterolisthesis of C5 on C6 measuring approximately 1-2 mm. There is mild broad dextroconvex curvature of the cervicothoracic junction.    CANAL/FORAMINA: Moderate to severe disc space narrowing C3-4 and C6-7 and moderate disc space narrowing C2-C3, C5-6 and mild/mild to moderate disc space narrowing elsewhere. Scattered marginal endplate and uncovertebral spurs. Multilevel degenerative   facet disease, moderate to severe on the left at C7-T1. Degenerative changes of the median atlantoaxial joint. Multilevel disc osteophyte complexes. No definite high-grade central spinal canal stenosis is identified. There is mild/moderate multilevel   bony neural foraminal stenosis.    PARASPINAL: No extraspinal abnormality. Visualized lung fields are clear.      Impression    IMPRESSION:  HEAD CT:  1.  No CT findings of acute intracranial process.    CERVICAL SPINE CT:  1.  No CT evidence for acute fracture or post traumatic subluxation.   CT Cervical Spine w/o Contrast     Status: None    Narrative    EXAM: CT CERVICAL SPINE W/O CONTRAST, CT HEAD W/O CONTRAST  LOCATION: [facility]  DATE: 1/17/2025    INDICATION: Pain, trauma.  COMPARISON: None.  TECHNIQUE:   1) Routine CT Head without IV contrast. Multiplanar reformats. Dose reduction techniques were used.  2) Routine CT Cervical Spine without IV contrast. Multiplanar reformats. Dose reduction techniques were used.    FINDINGS:   HEAD CT:   INTRACRANIAL CONTENTS: No intracranial hemorrhage, extraaxial collection, or mass effect.  No CT evidence of acute infarct. Normal  parenchymal attenuation. Normal ventricles and sulci.     VISUALIZED ORBITS/SINUSES/MASTOIDS: No intraorbital abnormality. No paranasal sinus mucosal disease. No middle ear or mastoid effusion.    BONES/SOFT TISSUES: No acute abnormality.    CERVICAL SPINE CT:   VERTEBRA: No acute cervical spine fracture is identified. There is mild anterolisthesis of C7 on T1 measuring 203 mm. Anterolisthesis of C5 on C6 measuring approximately 1-2 mm. There is mild broad dextroconvex curvature of the cervicothoracic junction.    CANAL/FORAMINA: Moderate to severe disc space narrowing C3-4 and C6-7 and moderate disc space narrowing C2-C3, C5-6 and mild/mild to moderate disc space narrowing elsewhere. Scattered marginal endplate and uncovertebral spurs. Multilevel degenerative   facet disease, moderate to severe on the left at C7-T1. Degenerative changes of the median atlantoaxial joint. Multilevel disc osteophyte complexes. No definite high-grade central spinal canal stenosis is identified. There is mild/moderate multilevel   bony neural foraminal stenosis.    PARASPINAL: No extraspinal abnormality. Visualized lung fields are clear.      Impression    IMPRESSION:  HEAD CT:  1.  No CT findings of acute intracranial process.    CERVICAL SPINE CT:  1.  No CT evidence for acute fracture or post traumatic subluxation.   Basic metabolic panel     Status: Abnormal   Result Value Ref Range    Sodium 141 135 - 145 mmol/L    Potassium 4.3 3.4 - 5.3 mmol/L    Chloride 107 98 - 107 mmol/L    Carbon Dioxide (CO2) 27 22 - 29 mmol/L    Anion Gap 7 7 - 15 mmol/L    Urea Nitrogen 25.7 (H) 8.0 - 23.0 mg/dL    Creatinine 1.14 0.67 - 1.17 mg/dL    GFR Estimate 67 >60 mL/min/1.73m2    Calcium 9.2 8.8 - 10.4 mg/dL    Glucose 113 (H) 70 - 99 mg/dL   Troponin T, High Sensitivity     Status: Normal   Result Value Ref Range    Troponin T, High Sensitivity 8 <=22 ng/L   CBC with platelets and differential     Status: None   Result Value Ref Range    WBC  Count 6.0 4.0 - 11.0 10e3/uL    RBC Count 4.62 4.40 - 5.90 10e6/uL    Hemoglobin 14.6 13.3 - 17.7 g/dL    Hematocrit 43.7 40.0 - 53.0 %    MCV 95 78 - 100 fL    MCH 31.6 26.5 - 33.0 pg    MCHC 33.4 31.5 - 36.5 g/dL    RDW 12.9 10.0 - 15.0 %    Platelet Count 284 150 - 450 10e3/uL    % Neutrophils 75 %    % Lymphocytes 16 %    % Monocytes 6 %    % Eosinophils 2 %    % Basophils 1 %    % Immature Granulocytes 1 %    NRBCs per 100 WBC 0 <1 /100    Absolute Neutrophils 4.5 1.6 - 8.3 10e3/uL    Absolute Lymphocytes 1.0 0.8 - 5.3 10e3/uL    Absolute Monocytes 0.4 0.0 - 1.3 10e3/uL    Absolute Eosinophils 0.1 0.0 - 0.7 10e3/uL    Absolute Basophils 0.1 0.0 - 0.2 10e3/uL    Absolute Immature Granulocytes 0.0 <=0.4 10e3/uL    Absolute NRBCs 0.0 10e3/uL   EKG 12-lead, tracing only     Status: None (Preliminary result)   Result Value Ref Range    Systolic Blood Pressure  mmHg    Diastolic Blood Pressure  mmHg    Ventricular Rate 51 BPM    Atrial Rate 51 BPM    ME Interval 158 ms    QRS Duration 96 ms     ms    QTc 377 ms    P Axis 33 degrees    R AXIS 10 degrees    T Axis 23 degrees    Interpretation ECG Sinus bradycardia  Otherwise normal ECG      CBC with platelets differential     Status: None    Narrative    The following orders were created for panel order CBC with platelets differential.  Procedure                               Abnormality         Status                     ---------                               -----------         ------                     CBC with platelets and d...[[id number]]                      Final result                 Please view results for these tests on the individual orders.         Results for orders placed or performed during the hospital encounter of 01/17/25   CT Head w/o Contrast     Status: None    Narrative    EXAM: CT CERVICAL SPINE W/O CONTRAST, CT HEAD W/O CONTRAST  LOCATION: [facility]  DATE: 1/17/2025    INDICATION: Pain,  trauma.  COMPARISON: None.  TECHNIQUE:   1) Routine CT Head without IV contrast. Multiplanar reformats. Dose reduction techniques were used.  2) Routine CT Cervical Spine without IV contrast. Multiplanar reformats. Dose reduction techniques were used.    FINDINGS:   HEAD CT:   INTRACRANIAL CONTENTS: No intracranial hemorrhage, extraaxial collection, or mass effect.  No CT evidence of acute infarct. Normal parenchymal attenuation. Normal ventricles and sulci.     VISUALIZED ORBITS/SINUSES/MASTOIDS: No intraorbital abnormality. No paranasal sinus mucosal disease. No middle ear or mastoid effusion.    BONES/SOFT TISSUES: No acute abnormality.    CERVICAL SPINE CT:   VERTEBRA: No acute cervical spine fracture is identified. There is mild anterolisthesis of C7 on T1 measuring 203 mm. Anterolisthesis of C5 on C6 measuring approximately 1-2 mm. There is mild broad dextroconvex curvature of the cervicothoracic junction.    CANAL/FORAMINA: Moderate to severe disc space narrowing C3-4 and C6-7 and moderate disc space narrowing C2-C3, C5-6 and mild/mild to moderate disc space narrowing elsewhere. Scattered marginal endplate and uncovertebral spurs. Multilevel degenerative   facet disease, moderate to severe on the left at C7-T1. Degenerative changes of the median atlantoaxial joint. Multilevel disc osteophyte complexes. No definite high-grade central spinal canal stenosis is identified. There is mild/moderate multilevel   bony neural foraminal stenosis.    PARASPINAL: No extraspinal abnormality. Visualized lung fields are clear.      Impression    IMPRESSION:  HEAD CT:  1.  No CT findings of acute intracranial process.    CERVICAL SPINE CT:  1.  No CT evidence for acute fracture or post traumatic subluxation.   CT Cervical Spine w/o Contrast     Status: None    Narrative    EXAM: CT CERVICAL SPINE W/O CONTRAST, CT HEAD W/O CONTRAST  LOCATION: [facility]  DATE:  1/17/2025    INDICATION: Pain, trauma.  COMPARISON: None.  TECHNIQUE:   1) Routine CT Head without IV contrast. Multiplanar reformats. Dose reduction techniques were used.  2) Routine CT Cervical Spine without IV contrast. Multiplanar reformats. Dose reduction techniques were used.    FINDINGS:   HEAD CT:   INTRACRANIAL CONTENTS: No intracranial hemorrhage, extraaxial collection, or mass effect.  No CT evidence of acute infarct. Normal parenchymal attenuation. Normal ventricles and sulci.     VISUALIZED ORBITS/SINUSES/MASTOIDS: No intraorbital abnormality. No paranasal sinus mucosal disease. No middle ear or mastoid effusion.    BONES/SOFT TISSUES: No acute abnormality.    CERVICAL SPINE CT:   VERTEBRA: No acute cervical spine fracture is identified. There is mild anterolisthesis of C7 on T1 measuring 203 mm. Anterolisthesis of C5 on C6 measuring approximately 1-2 mm. There is mild broad dextroconvex curvature of the cervicothoracic junction.    CANAL/FORAMINA: Moderate to severe disc space narrowing C3-4 and C6-7 and moderate disc space narrowing C2-C3, C5-6 and mild/mild to moderate disc space narrowing elsewhere. Scattered marginal endplate and uncovertebral spurs. Multilevel degenerative   facet disease, moderate to severe on the left at C7-T1. Degenerative changes of the median atlantoaxial joint. Multilevel disc osteophyte complexes. No definite high-grade central spinal canal stenosis is identified. There is mild/moderate multilevel   bony neural foraminal stenosis.    PARASPINAL: No extraspinal abnormality. Visualized lung fields are clear.      Impression    IMPRESSION:  HEAD CT:  1.  No CT findings of acute intracranial process.    CERVICAL SPINE CT:  1.  No CT evidence for acute fracture or post traumatic subluxation.   Basic metabolic panel     Status: Abnormal   Result Value Ref Range    Sodium 141 135 - 145 mmol/L    Potassium 4.3 3.4 - 5.3 mmol/L    Chloride 107 98 - 107 mmol/L    Carbon Dioxide (CO2)  27 22 - 29 mmol/L    Anion Gap 7 7 - 15 mmol/L    Urea Nitrogen 25.7 (H) 8.0 - 23.0 mg/dL    Creatinine 1.14 0.67 - 1.17 mg/dL    GFR Estimate 67 >60 mL/min/1.73m2    Calcium 9.2 8.8 - 10.4 mg/dL    Glucose 113 (H) 70 - 99 mg/dL   Troponin T, High Sensitivity     Status: Normal   Result Value Ref Range    Troponin T, High Sensitivity 8 <=22 ng/L   CBC with platelets and differential     Status: None   Result Value Ref Range    WBC Count 6.0 4.0 - 11.0 10e3/uL    RBC Count 4.62 4.40 - 5.90 10e6/uL    Hemoglobin 14.6 13.3 - 17.7 g/dL    Hematocrit 43.7 40.0 - 53.0 %    MCV 95 78 - 100 fL    MCH 31.6 26.5 - 33.0 pg    MCHC 33.4 31.5 - 36.5 g/dL    RDW 12.9 10.0 - 15.0 %    Platelet Count 284 150 - 450 10e3/uL    % Neutrophils 75 %    % Lymphocytes 16 %    % Monocytes 6 %    % Eosinophils 2 %    % Basophils 1 %    % Immature Granulocytes 1 %    NRBCs per 100 WBC 0 <1 /100    Absolute Neutrophils 4.5 1.6 - 8.3 10e3/uL    Absolute Lymphocytes 1.0 0.8 - 5.3 10e3/uL    Absolute Monocytes 0.4 0.0 - 1.3 10e3/uL    Absolute Eosinophils 0.1 0.0 - 0.7 10e3/uL    Absolute Basophils 0.1 0.0 - 0.2 10e3/uL    Absolute Immature Granulocytes 0.0 <=0.4 10e3/uL    Absolute NRBCs 0.0 10e3/uL   EKG 12-lead, tracing only     Status: None (Preliminary result)   Result Value Ref Range    Systolic Blood Pressure  mmHg    Diastolic Blood Pressure  mmHg    Ventricular Rate 51 BPM    Atrial Rate 51 BPM    MS Interval 158 ms    QRS Duration 96 ms     ms    QTc 377 ms    P Axis 33 degrees    R AXIS 10 degrees    T Axis 23 degrees    Interpretation ECG Sinus bradycardia  Otherwise normal ECG      CBC with platelets differential     Status: None    Narrative    The following orders were created for panel order CBC with platelets differential.  Procedure                               Abnormality         Status                     ---------                               -----------         ------                     CBC with platelets and  d...[[id number]]                      Final result                 Please view results for these tests on the individual orders.     Medications   lidocaine 1% with EPINEPHrine 1:100,000 injection 30 mL (has no administration in time range)   lidocaine (PF) (XYLOCAINE) 1 % injection (has no administration in time range)     Labs Ordered and Resulted from Time of ED Arrival to Time of ED Departure   BASIC METABOLIC PANEL - Abnormal       Result Value    Sodium 141      Potassium 4.3      Chloride 107      Carbon Dioxide (CO2) 27      Anion Gap 7      Urea Nitrogen 25.7 (*)     Creatinine 1.14      GFR Estimate 67      Calcium 9.2      Glucose 113 (*)    TROPONIN T, HIGH SENSITIVITY - Normal    Troponin T, High Sensitivity 8     CBC WITH PLATELETS AND DIFFERENTIAL    WBC Count 6.0      RBC Count 4.62      Hemoglobin 14.6      Hematocrit 43.7      MCV 95      MCH 31.6      MCHC 33.4      RDW 12.9      Platelet Count 284      % Neutrophils 75      % Lymphocytes 16      % Monocytes 6      % Eosinophils 2      % Basophils 1      % Immature Granulocytes 1      NRBCs per 100 WBC 0      Absolute Neutrophils 4.5      Absolute Lymphocytes 1.0      Absolute Monocytes 0.4      Absolute Eosinophils 0.1      Absolute Basophils 0.1      Absolute Immature Granulocytes 0.0      Absolute NRBCs 0.0       CT Cervical Spine w/o Contrast   Final Result   IMPRESSION:   HEAD CT:   1.  No CT findings of acute intracranial process.      CERVICAL SPINE CT:   1.  No CT evidence for acute fracture or post traumatic subluxation.      CT Head w/o Contrast   Final Result   IMPRESSION:   HEAD CT:   1.  No CT findings of acute intracranial process.      CERVICAL SPINE CT:   1.  No CT evidence for acute fracture or post traumatic subluxation.         [facility] Procedure Note        Laceration Repair:    Performed by: SHANTAL RANGEL MD, MD  Authorized by: SHANTAL RANGEL MD, MD  Consent given by: Patient who states understanding of the procedure being  performed after discussing the risks, benefits and alternatives.    Preparation: Patient was prepped and draped in usual sterile fashion.  Irrigation solution: saline    Body area:forehead  Laceration length: 2cm  Contamination: The wound is not contaminated.  Foreign bodies:none  Tendon involvement: none  Anesthesia: Local  Local anesthetic: Lidocaine     1%  Anesthetic total: 4ml    Debridement: none  Skin closure: Closed with 4 x 6.0 Prolene  Technique: interrupted  Approximation: close  Approximation difficulty: simple    [facility] Procedure Note        Laceration Repair:    Performed by: SHANTAL RANGEL MD, MD  Authorized by: SHANTAL RANGEL MD, MD  Consent given by: Patient who states understanding of the procedure being performed after discussing the risks, benefits and alternatives.    Preparation: Patient was prepped and draped in usual sterile fashion.  Irrigation solution: saline    Body area:scalp  Laceration length: 2cm  Contamination: The wound is not contaminated.  Foreign bodies:none  Tendon involvement: none  Anesthesia: Local  Local anesthetic: Lidocaine     1%  Anesthetic total: 4ml    Debridement: none  Skin closure: Closed with 4 Staples  Technique: interrupted  Approximation: close  Approximation difficulty: simple    Patient tolerance: Patient tolerated the procedure well with no immediate complications.     Patient tolerance: Patient tolerated the procedure well with no immediate complications.     Critical care was not performed.     Reviewed previous CBC, metabolic panel, EKG    Medical Decision Making  The patient's presentation was of moderate complexity (an acute complicated injury).    The patient's evaluation involved:  review of 3+ test result(s) ordered prior to this encounter (see separate area of note for details)  ordering and/or review of 3+ test(s) in this encounter (see separate area of note for details)    The patient's management necessitated moderate risk (a decision  regarding minor procedure (laceration repair) with risk factors of none).    Assessment & Plan    74 year old male to the emergency department with forehead and scalp lacerations that he sustained in a fall today.  The patient reports a history of partial idiopathic epilepsy with seizures.  He believes he had a episode today.  He had prodromal symptoms which are less intense than usual.  He states that he did not sit down as he normally would and subsequently lost postural tone and sustained lacerations.  He presents with a headache but normal neurologic examination.  CT of head and cervical spine are normal.  Patient is EKG is sinus bradycardia without ischemic change unchanged from previous.  Troponin is normal so do not suspect ACS.  He has normal electrolytes so do not suspect electrolyte disturbance.  His hemoglobin is normal.  He appears and feels back to baseline.  His wounds repaired in the usual fashion.  His forehead was repaired with sutures and his posterior scalp laceration was repaired with staples.  He was offered observation for cardiac monitoring but feels this episode was consistent with his previous neurologic condition.  He has been informed by his neurologist that is safe for him to drive as he has prodromal symptoms.  He was asked to contact his neurologist again to discuss the safety of his driving at this time.  He was asked to avoid driving until after that conversation.  Patient declined observation and further potential cardiac evaluation including Zio patch and echocardiogram.  He will be discharged home with plan for primary care follow-up in approxi-1 week for suture and staple removal.  Strict return precautions were provided.    I have reviewed the nursing notes. I have reviewed the findings, diagnosis, plan and need for follow up with the patient.    New Prescriptions    No medications on file       Final diagnoses:   Laceration of forehead, initial encounter   Scalp laceration,  initial encounter     Chart documentation was completed with Dragon voice-recognition software. Even though reviewed, this chart may still contain some grammatical, spelling, and word errors.     Matthew Taylor Md    [facility] EMERGENCY DEPARTMENT  1/17/2025     Matthew Taylor MD  01/17/25 1133

## 2025-01-17 NOTE — DISCHARGE INSTRUCTIONS
Follow up with your clinic in approximately 1 week for suture and staple removal.  Discuss future driving with your neurologist.    Return if redness, swelling, drainage, fever, fainting, or other concerns.

## 2025-01-17 NOTE — ED TRIAGE NOTES
Pt fell in the bathroom at home losing consciousness and hitting his forehead and back of head. Neruos intact at time of arrival. Pt reporting mild pain. Lacerations mildly bleeding but controlled. Pt calm and cooperative. IV and labs completed. EGK and CT ordered.     Triage Assessment (Adult)       Row Name 01/17/25 0856 01/17/25 0854       Triage Assessment    Airway WDL WDL WDL       Respiratory WDL    Respiratory WDL WDL WDL       Skin Circulation/Temperature WDL    Skin Circulation/Temperature WDL WDL WDL       Cardiac WDL    Cardiac WDL WDL WDL       Peripheral/Neurovascular WDL    Peripheral Neurovascular WDL WDL WDL       Cognitive/Neuro/Behavioral WDL    Cognitive/Neuro/Behavioral WDL WDL WDL       Eva Coma Scale    Best Eye Response -- 4-->(E4) spontaneous    Best Motor Response -- 6-->(M6) obeys commands    Best Verbal Response -- 5-->(V5) oriented    Eva Coma Scale Score -- 15

## 2025-03-23 ENCOUNTER — HEALTH MAINTENANCE LETTER (OUTPATIENT)
Age: 75
End: 2025-03-23

## 2025-04-07 ENCOUNTER — TELEPHONE (OUTPATIENT)
Dept: FAMILY MEDICINE | Facility: CLINIC | Age: 75
End: 2025-04-07
Payer: COMMERCIAL

## 2025-04-07 NOTE — TELEPHONE ENCOUNTER
Order/Referral Request    Who is requesting: pt    Orders being requested: cortisone injection     Reason service is needed/diagnosis: right knee    When are orders needed by: asap    Has this been discussed with Provider: Yes    Does patient have a preference on a Group/Provider/Facility? [facility]    Does patient have an appointment scheduled?: No    Where to send orders: Place orders within Epic    Could we send this information to you in [facility] or would you prefer to receive a phone call?:   Patient would prefer a phone call   Okay to leave a detailed message?: Yes at Cell number on file:    Telephone Information:   Mobile [phone]

## 2025-04-07 NOTE — TELEPHONE ENCOUNTER
Dr. Jones--- NISH. Scheduled pt to get steroid shot with you on 4/9.     Writer contacted pt. Pt reported right knee has been getting steroid injection for years now. It started hurting 3 weeks ago. Pt planned to come in to get seen, but had a trip. He did a lot of walking during the trip and just got back last Wednesday. Pain level is 3-4/10 when sitting and around 7/10 when walking. Pain particularly gets worse during the day. Reported he usually gets steroid injection at [facility], but they do not have an earlier appt for him. He was scheduled for 5/6. Pt wander if he can get seen sooner at . Reported PCP had given him the injection before, but was a few years ago. Scheduled pt to see Dr. Jones on 4/9. Pt would appreciate it if writer route this message to PCP for a heads up.     Asael Garrison, BSN, PHN, RN-[facility]

## 2025-04-08 ASSESSMENT — PATIENT HEALTH QUESTIONNAIRE - PHQ9
SUM OF ALL RESPONSES TO PHQ QUESTIONS 1-9: 5
10. IF YOU CHECKED OFF ANY PROBLEMS, HOW DIFFICULT HAVE THESE PROBLEMS MADE IT FOR YOU TO DO YOUR WORK, TAKE CARE OF THINGS AT HOME, OR GET ALONG WITH OTHER PEOPLE: NOT DIFFICULT AT ALL
SUM OF ALL RESPONSES TO PHQ QUESTIONS 1-9: 5

## 2025-04-09 ENCOUNTER — OFFICE VISIT (OUTPATIENT)
Dept: FAMILY MEDICINE | Facility: CLINIC | Age: 75
End: 2025-04-09
Payer: COMMERCIAL

## 2025-04-09 VITALS
WEIGHT: 181.9 LBS | SYSTOLIC BLOOD PRESSURE: 106 MMHG | BODY MASS INDEX: 24.64 KG/M2 | HEART RATE: 52 BPM | RESPIRATION RATE: 14 BRPM | HEIGHT: 72 IN | TEMPERATURE: 97.7 F | DIASTOLIC BLOOD PRESSURE: 66 MMHG | OXYGEN SATURATION: 98 %

## 2025-04-09 DIAGNOSIS — M17.11 ARTHRITIS OF RIGHT KNEE: Primary | ICD-10-CM

## 2025-04-09 DIAGNOSIS — Z29.11 NEED FOR VACCINATION AGAINST RESPIRATORY SYNCYTIAL VIRUS: ICD-10-CM

## 2025-04-09 RX ORDER — TRIAMCINOLONE ACETONIDE 40 MG/ML
40 INJECTION, SUSPENSION INTRA-ARTICULAR; INTRAMUSCULAR ONCE
Status: COMPLETED | OUTPATIENT
Start: 2025-04-09 | End: 2025-04-09

## 2025-04-09 RX ADMIN — TRIAMCINOLONE ACETONIDE 40 MG: 40 INJECTION, SUSPENSION INTRA-ARTICULAR; INTRAMUSCULAR at 15:10

## 2025-04-09 ASSESSMENT — PAIN SCALES - GENERAL: PAINLEVEL_OUTOF10: MODERATE PAIN (4)

## 2025-04-09 NOTE — PROGRESS NOTES
{PROVIDER CHARTING PREFERENCE:423571}    Arline Gunderson is a 75 year old, presenting for the following health issues:  Knee Injection        4/9/2025     2:47 PM   Additional Questions   Roomed by Chandrakant Cortez   Accompanied by Self     History of Present Illness       Reason for visit:  Corticoid injection in knee.  Note: [place] sam[place] out of [place], this program does not  have an answer for [place] and i an fatigued due to time change reentery    He eats 4 or more servings of fruits and vegetables daily.He consumes 1 sweetened beverage(s) daily.He exercises with enough effort to increase his heart rate 10 to 19 minutes per day.  He exercises with enough effort to increase his heart rate 3 or less days per week.   He is taking medications regularly.        January seizures and had head injury.     Been to ENT last year.     Going to see neurologist for follow up.     Cutting down wellbutrin to 150mg every 3rd day. Will stop once he run out.    300-600-600 gabapentin dosing.     July 2024 last injection through ortho. Could not get into ortho.     Went to [place] and lots of walking. Lots of walking.         Objective    /66 (BP Location: Right arm, Patient Position: Sitting, Cuff Size: Adult Regular)   Pulse 52   Temp 97.7  F (36.5  C) (Temporal)   Resp 14   Ht 1.829 m (6')   Wt 82.5 kg (181 lb 14.4 oz)   SpO2 98%   BMI 24.67 kg/m    Body mass index is 24.67 kg/m .  Physical Exam   {Exam List (Optional):044763}    {Diagnostic Test Results (Optional):242625}        Signed Electronically by: Mark Jones MD, MD  {Email feedback regarding this note to [email or web address]   :[id number]}

## 2025-04-11 ENCOUNTER — OFFICE VISIT (OUTPATIENT)
Dept: NEUROLOGY | Facility: CLINIC | Age: 75
End: 2025-04-11
Payer: COMMERCIAL

## 2025-04-11 VITALS
SYSTOLIC BLOOD PRESSURE: 128 MMHG | HEART RATE: 50 BPM | HEIGHT: 72 IN | WEIGHT: 182 LBS | BODY MASS INDEX: 24.65 KG/M2 | TEMPERATURE: 97.4 F | DIASTOLIC BLOOD PRESSURE: 73 MMHG

## 2025-04-11 DIAGNOSIS — G40.009 PARTIAL IDIOPATHIC EPILEPSY WITH SEIZURES OF LOCALIZED ONSET, NOT INTRACTABLE, WITHOUT STATUS EPILEPTICUS (H): Primary | ICD-10-CM

## 2025-04-11 RX ORDER — LAMOTRIGINE 100 MG/1
TABLET ORAL
Qty: 100 TABLET | Refills: 2 | Status: SHIPPED | OUTPATIENT
Start: 2025-04-11

## 2025-04-11 RX ORDER — LORAZEPAM 1 MG/1
1 TABLET ORAL EVERY 6 HOURS PRN
Qty: 20 TABLET | Refills: 2 | Status: SHIPPED | OUTPATIENT
Start: 2025-04-11

## 2025-04-11 RX ORDER — LAMOTRIGINE 25 MG/1
TABLET ORAL
Qty: 100 TABLET | Refills: 1 | Status: SHIPPED | OUTPATIENT
Start: 2025-04-11

## 2025-04-11 NOTE — LETTER
2025       RE: Neptali Moore  : 1950   MRN: [id number]      Dear Colleague,    Thank you for referring your patient, Neptali Moore, to the [facility]PILE[facility] at [facility]. Please see a copy of my visit note below.    Interval Hx. Focal aware Sz Oct & 2024. Both AM, smell dizzy but cleared. 2025 had smell, dizzy that dis nit clear, into shower, had sz and sustined head laceratiuon with surures in ER.  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 It was hard to describe but it finally came up with burning rubber. It lasted approximately 4 to 5 minutes. At this time he also felt fuzzy. He did remember everything that was happening. No one observed this event. He did not have any abdominal rising sensation or any other symptoms. He called the fire department to determine if there was some kind of hidden fire, but none was detected. The second event occurred in October. His partner had opened a jar a face cream and he suddenly began to smell a very bad odor similar to the one he had smelled in September. His partner however did not smell anything. She did notice some jerking of his legs more like shaking lightly. He was fully aware of this event which again lasted a few minutes and it was accompanied by fuzziness. Later the same day he was helping his brother outdoors again we had a similar event. He went to a local emergency room where the differential diagnosis include a stroke or seizure. MRI scan was negative. Physical examination was negative. He was discharged home and he has had no further events.   Risk factors: birth history indicates his weight was approximately 8 lbs, no difficulties at birth and mother had no illnesses. No febrile convulsions. Minor concussion in school where he recovered consciousness with no  known after effects. No history of drug abuse or heavy alcohol use.  Family history negative for epilepsy  review of systems: negative for stroke, heart disease, high blood pressure, diabetes. Has bradycardia.  MRI   scan report describes minimal small vessel disease  EEG for three hours with extensive sleep is within normal limits for age.  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  Physical examination: he is alert, slender, relaxed.  Speech is fluent but he does have some word finding difficulty during normal conversation. He recognizes that a $3 bill is fake recognizes former president but does not recall the name and is able to describe the humor.  Visual fields are full to confrontation, pupils are equal, eye movements are full without nystagmus. Facial movements are normal. Hearing is intact. Tongue and palate move normally.  Cerebellar testing: there is minor dysmetria on finger two nose testing. Tandem gate reveals mild ataxia, and this is apparent on walking down  the stiles.  Balance is normal  motor tone and strength is normal.     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 With his history of mild word finding difficulty and mild memory issues, the left would be most likely. However the MRI and EG do not show any mesial temporal sclerosis or epileptiform activity. Jan 17 Sz suggests FA- Follow-up to Possible generalized sz. Head injury       Gabapentin not contolling sz so will ADD lamotrigene titrated to 300 mg/day as per insrtuctions on RX  2) the ataxia findings were a surprise and suggest a  possible mild underlying cerebellar dysfunction but more likely to be related to knee issues.  Plan  1)     continue gabapentin   2) LAMO  3) Lorazepam 1 mg prn for  FA sz to prevent larger event  3) RTC 3 mo for LAMO level and possinble dose change     4)    because these are  now focal aware he is able to drive at the present time      Time: pre visit  4min, face to face 31  min discusimng MRI and neuropsych findings, post vist 3 min charting.    Sergio Zurita MD      Again, thank you for allowing me to participate in the care of your patient.      Sincerely,    Sergio Zurita MD

## 2025-04-15 ENCOUNTER — MYC MEDICAL ADVICE (OUTPATIENT)
Dept: NEUROLOGY | Facility: CLINIC | Age: 75
End: 2025-04-15

## 2025-04-15 NOTE — TELEPHONE ENCOUNTER
Discussed with   Goal is to increase the lamotrigine (non depakote) to a daily dose of 300mg    MyChart message returned to patient with instructions

## 2025-05-06 ENCOUNTER — TRANSFERRED RECORDS (OUTPATIENT)
Dept: HEALTH INFORMATION MANAGEMENT | Facility: CLINIC | Age: 75
End: 2025-05-06
Payer: COMMERCIAL

## 2025-06-10 ENCOUNTER — LAB (OUTPATIENT)
Dept: LAB | Facility: CLINIC | Age: 75
End: 2025-06-10
Payer: COMMERCIAL

## 2025-06-10 DIAGNOSIS — G40.009 PARTIAL IDIOPATHIC EPILEPSY WITH SEIZURES OF LOCALIZED ONSET, NOT INTRACTABLE, WITHOUT STATUS EPILEPTICUS (H): ICD-10-CM

## 2025-06-10 PROCEDURE — 80175 DRUG SCREEN QUAN LAMOTRIGINE: CPT | Mod: 90 | Performed by: PATHOLOGY

## 2025-06-10 PROCEDURE — 80171 DRUG SCREEN QUANT GABAPENTIN: CPT | Mod: 90 | Performed by: PATHOLOGY

## 2025-06-10 PROCEDURE — 99000 SPECIMEN HANDLING OFFICE-LAB: CPT | Performed by: PATHOLOGY

## 2025-06-10 PROCEDURE — 36415 COLL VENOUS BLD VENIPUNCTURE: CPT | Performed by: PATHOLOGY

## 2025-06-12 LAB
GABAPENTIN SERPLBLD-MCNC: 8.9 UG/ML
LAMOTRIGINE SERPL-MCNC: 8.2 UG/ML

## 2025-06-16 ENCOUNTER — PATIENT OUTREACH (OUTPATIENT)
Dept: CARE COORDINATION | Facility: CLINIC | Age: 75
End: 2025-06-16
Payer: COMMERCIAL

## 2025-06-18 ENCOUNTER — PATIENT OUTREACH (OUTPATIENT)
Dept: CARE COORDINATION | Facility: CLINIC | Age: 75
End: 2025-06-18
Payer: COMMERCIAL

## 2025-06-23 ENCOUNTER — TELEPHONE (OUTPATIENT)
Dept: FAMILY MEDICINE | Facility: CLINIC | Age: 75
End: 2025-06-23
Payer: COMMERCIAL

## 2025-06-23 DIAGNOSIS — G40.009 PARTIAL IDIOPATHIC EPILEPSY WITH SEIZURES OF LOCALIZED ONSET, NOT INTRACTABLE, WITHOUT STATUS EPILEPTICUS (H): Primary | ICD-10-CM

## 2025-06-23 NOTE — TELEPHONE ENCOUNTER
Dr. Jones--- please review call intake notes below and advise. Pt is requesting referral to [facility] for seizures. Pending referral. Please review and edit/approve as appropriate. Thank you!    Asael Garrison, NANYN, PHN, RN-[facility]

## 2025-06-23 NOTE — TELEPHONE ENCOUNTER
Patient called and stated that he wanted to request for a referral to [facility]. Patient requested for Seizure/Epilepsy Neurology referral. Writer will pass request to PCP and care team for further review. Call patient instead of [employer] message as he does have issues with it.       Call or leave detail .   [phone]

## 2025-06-24 NOTE — TELEPHONE ENCOUNTER
Referral faxed to [facility] [phone], copy kept in clinic, pt notified via phone to give 24 hours to process referral before calling [facility] to schedule

## 2025-06-25 ENCOUNTER — PATIENT OUTREACH (OUTPATIENT)
Dept: CARE COORDINATION | Facility: CLINIC | Age: 75
End: 2025-06-25
Payer: COMMERCIAL

## 2025-06-27 ENCOUNTER — TELEPHONE (OUTPATIENT)
Dept: NEUROLOGY | Facility: CLINIC | Age: 75
End: 2025-06-27

## 2025-06-27 NOTE — TELEPHONE ENCOUNTER
Health Call Center    Phone Message    May a detailed message be left on voicemail: yes     Reason for Call: I have Dar from the [facility] calling in wanting to speak to a nurse who works with Priya Diehl MD. States that the patient was seen today and had their blood drawn. The lab sample was spun down already, but they are needing to know what tube the original blood draw was from. Writer was unable to reach staff via Epic secure chat. Please review as needed and reach back out to Dar at [phone].    Action Taken: Message routed to:  Other: PATRICK RN

## 2025-06-30 ENCOUNTER — MYC MEDICAL ADVICE (OUTPATIENT)
Dept: FAMILY MEDICINE | Facility: CLINIC | Age: 75
End: 2025-06-30
Payer: COMMERCIAL

## 2025-08-12 ENCOUNTER — OFFICE VISIT (OUTPATIENT)
Dept: NEUROLOGY | Facility: CLINIC | Age: 75
End: 2025-08-12
Payer: COMMERCIAL

## 2025-08-12 VITALS
DIASTOLIC BLOOD PRESSURE: 63 MMHG | SYSTOLIC BLOOD PRESSURE: 111 MMHG | TEMPERATURE: 97.3 F | HEART RATE: 48 BPM | OXYGEN SATURATION: 98 %

## 2025-08-12 DIAGNOSIS — G40.009 PARTIAL IDIOPATHIC EPILEPSY WITH SEIZURES OF LOCALIZED ONSET, NOT INTRACTABLE, WITHOUT STATUS EPILEPTICUS (H): Primary | ICD-10-CM

## 2025-08-12 DIAGNOSIS — M25.559 PAIN IN JOINT INVOLVING PELVIC REGION AND THIGH, UNSPECIFIED LATERALITY: ICD-10-CM

## 2025-08-12 RX ORDER — GABAPENTIN 300 MG/1
CAPSULE ORAL
Qty: 120 CAPSULE | Refills: 1 | Status: SHIPPED | OUTPATIENT
Start: 2025-08-12

## 2025-08-12 RX ORDER — LAMOTRIGINE 100 MG/1
TABLET ORAL
Qty: 240 TABLET | Refills: 3 | Status: SHIPPED | OUTPATIENT
Start: 2025-08-12

## 2025-08-12 RX ORDER — LEVETIRACETAM 500 MG/1
TABLET ORAL
Qty: 180 TABLET | Refills: 1 | Status: SHIPPED | OUTPATIENT
Start: 2025-08-12

## (undated) DEVICE — ENDO BITE BLOCK ADULT OMNI-BLOC

## (undated) DEVICE — SUCTION MANIFOLD NEPTUNE 2 SYS 1 PORT 702-025-000

## (undated) DEVICE — SPECIMEN CONTAINER 3OZ W/FORMALIN 59901

## (undated) DEVICE — SYR ORISE GEL 10ML M00519201

## (undated) DEVICE — QUICK CLIP PRO

## (undated) DEVICE — ESU GROUND PAD ADULT W/CORD E7507

## (undated) DEVICE — SOL WATER IRRIG 500ML BOTTLE 2F7113

## (undated) DEVICE — KIT ENDO TURNOVER/PROCEDURE CARRY-ON 101822

## (undated) DEVICE — NDL SCLEROTHERAPY 25GA CARR-LOCK  00711811

## (undated) DEVICE — ENDO TRAP POLYP E-TRAP 00711099

## (undated) DEVICE — TUBING SUCTION 12"X1/4" N612

## (undated) DEVICE — GLOVE EXAM NITRILE LG PF LATEX FREE 5064

## (undated) DEVICE — KIT ENDO FIRST STEP DISINFECTANT 200ML W/POUCH EP-4

## (undated) DEVICE — GOWN IMPERVIOUS 2XL BLUE

## (undated) DEVICE — SUCTION CATH AIRLIFE TRI-FLO W/CONTROL PORT 14FR  T60C

## (undated) DEVICE — SYR 30ML SLIP TIP W/O NDL 302833

## (undated) DEVICE — ENDO FORCEP ENDOJAW BIOPSY 2.8MMX230CM FB-220U

## (undated) DEVICE — ENDO SNARE POLYPECTOMY SPIRAL 20MM LOOP SD-230U-20

## (undated) DEVICE — SOL WATER IRRIG 1000ML BOTTLE 2F7114

## (undated) RX ORDER — FENTANYL CITRATE 50 UG/ML
INJECTION, SOLUTION INTRAMUSCULAR; INTRAVENOUS
Status: DISPENSED
Start: 2019-08-06

## (undated) RX ORDER — DIPHENHYDRAMINE HYDROCHLORIDE 50 MG/ML
INJECTION INTRAMUSCULAR; INTRAVENOUS
Status: DISPENSED
Start: 2019-08-06

## (undated) RX ORDER — FENTANYL CITRATE 50 UG/ML
INJECTION, SOLUTION INTRAMUSCULAR; INTRAVENOUS
Status: DISPENSED
Start: 2021-09-07